# Patient Record
Sex: MALE | Race: WHITE | NOT HISPANIC OR LATINO | Employment: FULL TIME | URBAN - METROPOLITAN AREA
[De-identification: names, ages, dates, MRNs, and addresses within clinical notes are randomized per-mention and may not be internally consistent; named-entity substitution may affect disease eponyms.]

---

## 2017-07-11 ENCOUNTER — ALLSCRIPTS OFFICE VISIT (OUTPATIENT)
Dept: OTHER | Facility: OTHER | Age: 67
End: 2017-07-11

## 2017-07-31 ENCOUNTER — DOCTOR'S OFFICE (OUTPATIENT)
Dept: URBAN - NONMETROPOLITAN AREA CLINIC 1 | Facility: CLINIC | Age: 67
Setting detail: OPHTHALMOLOGY
End: 2017-07-31
Payer: COMMERCIAL

## 2017-07-31 ENCOUNTER — DOCTOR'S OFFICE (OUTPATIENT)
Dept: URBAN - NONMETROPOLITAN AREA CLINIC 1 | Facility: CLINIC | Age: 67
Setting detail: OPHTHALMOLOGY
End: 2017-07-31

## 2017-07-31 ENCOUNTER — RX ONLY (RX ONLY)
Age: 67
End: 2017-07-31

## 2017-07-31 DIAGNOSIS — H35.413: ICD-10-CM

## 2017-07-31 DIAGNOSIS — E11.3293: ICD-10-CM

## 2017-07-31 DIAGNOSIS — H25.13: ICD-10-CM

## 2017-07-31 DIAGNOSIS — Z79.84: ICD-10-CM

## 2017-07-31 DIAGNOSIS — H43.812: ICD-10-CM

## 2017-07-31 DIAGNOSIS — H52.4: ICD-10-CM

## 2017-07-31 DIAGNOSIS — H43.811: ICD-10-CM

## 2017-07-31 DIAGNOSIS — H43.813: ICD-10-CM

## 2017-07-31 PROCEDURE — 92225 OPHTHALMOSCOPY EXTENDED INITIAL: CPT | Performed by: OPHTHALMOLOGY

## 2017-07-31 PROCEDURE — VITAEYE VITA EYE VITAMINS: Performed by: OPHTHALMOLOGY

## 2017-07-31 PROCEDURE — 92134 CPTRZ OPH DX IMG PST SGM RTA: CPT | Performed by: OPHTHALMOLOGY

## 2017-07-31 PROCEDURE — 99204 OFFICE O/P NEW MOD 45 MIN: CPT | Performed by: OPHTHALMOLOGY

## 2017-07-31 ASSESSMENT — REFRACTION_MANIFEST
OD_VA3: 20/
OS_VA3: 20/
OD_VA2: 20/
OS_VA3: 20/
OS_VA1: 20/
OD_VA3: 20/
OD_VA2: 20/
OS_VA1: 20/
OD_VA1: 20/
OU_VA: 20/
OS_VA2: 20/
OS_VA2: 20/
OS_VA1: 20/
OD_VA2: 20/
OS_VA3: 20/
OS_VA2: 20/
OU_VA: 20/
OD_VA3: 20/
OD_VA1: 20/
OU_VA: 20/
OD_VA1: 20/

## 2017-07-31 ASSESSMENT — REFRACTION_CURRENTRX
OD_OVR_VA: 20/
OS_OVR_VA: 20/
OD_OVR_VA: 20/
OD_OVR_VA: 20/
OS_OVR_VA: 20/
OS_OVR_VA: 20/

## 2017-07-31 ASSESSMENT — VISUAL ACUITY
OS_BCVA: 20/30+2
OD_BCVA: 20/25-1

## 2017-07-31 ASSESSMENT — CONFRONTATIONAL VISUAL FIELD TEST (CVF)
OD_FINDINGS: FULL
OS_FINDINGS: FULL

## 2017-08-14 ENCOUNTER — DOCTOR'S OFFICE (OUTPATIENT)
Dept: URBAN - METROPOLITAN AREA CLINIC 126 | Facility: CLINIC | Age: 67
Setting detail: OPHTHALMOLOGY
End: 2017-08-14

## 2017-08-14 ENCOUNTER — DOCTOR'S OFFICE (OUTPATIENT)
Dept: URBAN - NONMETROPOLITAN AREA CLINIC 1 | Facility: CLINIC | Age: 67
Setting detail: OPHTHALMOLOGY
End: 2017-08-14
Payer: COMMERCIAL

## 2017-08-14 DIAGNOSIS — Z79.84: ICD-10-CM

## 2017-08-14 DIAGNOSIS — H43.811: ICD-10-CM

## 2017-08-14 DIAGNOSIS — H43.813: ICD-10-CM

## 2017-08-14 DIAGNOSIS — H43.812: ICD-10-CM

## 2017-08-14 DIAGNOSIS — E11.3293: ICD-10-CM

## 2017-08-14 DIAGNOSIS — H52.4: ICD-10-CM

## 2017-08-14 DIAGNOSIS — H35.413: ICD-10-CM

## 2017-08-14 DIAGNOSIS — H25.13: ICD-10-CM

## 2017-08-14 PROCEDURE — VITAEYE VITA EYE VITAMINS: Performed by: OPHTHALMOLOGY

## 2017-08-14 PROCEDURE — 92014 COMPRE OPH EXAM EST PT 1/>: CPT | Performed by: OPHTHALMOLOGY

## 2017-08-14 PROCEDURE — 92226 OPHTHALMOSCOPY EXT SUBSEQUENT: CPT | Performed by: OPHTHALMOLOGY

## 2017-08-14 ASSESSMENT — REFRACTION_MANIFEST
OS_VA1: 20/
OS_VA3: 20/
OS_VA2: 20/
OS_VA3: 20/
OD_VA2: 20/
OS_VA1: 20/
OS_VA2: 20/
OD_VA1: 20/
OS_VA1: 20/
OD_VA3: 20/
OS_VA2: 20/
OD_VA2: 20/
OD_VA2: 20/
OD_VA3: 20/
OU_VA: 20/
OU_VA: 20/
OD_VA3: 20/
OS_VA3: 20/
OD_VA1: 20/
OU_VA: 20/
OD_VA1: 20/

## 2017-08-14 ASSESSMENT — REFRACTION_CURRENTRX
OD_OVR_VA: 20/
OD_OVR_VA: 20/
OS_OVR_VA: 20/
OS_OVR_VA: 20/
OD_OVR_VA: 20/
OS_OVR_VA: 20/

## 2017-08-14 ASSESSMENT — VISUAL ACUITY
OS_BCVA: 20/30+1
OD_BCVA: 20/25+1

## 2017-08-14 ASSESSMENT — CONFRONTATIONAL VISUAL FIELD TEST (CVF)
OD_FINDINGS: FULL
OS_FINDINGS: FULL

## 2017-10-16 ENCOUNTER — DOCTOR'S OFFICE (OUTPATIENT)
Dept: URBAN - NONMETROPOLITAN AREA CLINIC 1 | Facility: CLINIC | Age: 67
Setting detail: OPHTHALMOLOGY
End: 2017-10-16
Payer: COMMERCIAL

## 2017-10-16 DIAGNOSIS — H43.813: ICD-10-CM

## 2017-10-16 DIAGNOSIS — H25.13: ICD-10-CM

## 2017-10-16 DIAGNOSIS — H43.812: ICD-10-CM

## 2017-10-16 DIAGNOSIS — Z79.84: ICD-10-CM

## 2017-10-16 DIAGNOSIS — E11.3293: ICD-10-CM

## 2017-10-16 DIAGNOSIS — H43.811: ICD-10-CM

## 2017-10-16 DIAGNOSIS — H35.413: ICD-10-CM

## 2017-10-16 PROCEDURE — 92014 COMPRE OPH EXAM EST PT 1/>: CPT | Performed by: OPHTHALMOLOGY

## 2017-10-16 PROCEDURE — 92226 OPHTHALMOSCOPY EXT SUBSEQUENT: CPT | Performed by: OPHTHALMOLOGY

## 2017-10-16 ASSESSMENT — REFRACTION_CURRENTRX
OS_OVR_VA: 20/
OS_OVR_VA: 20/
OD_OVR_VA: 20/
OD_OVR_VA: 20/
OS_OVR_VA: 20/
OD_OVR_VA: 20/

## 2017-10-16 ASSESSMENT — REFRACTION_MANIFEST
OU_VA: 20/
OD_VA3: 20/
OD_VA3: 20/
OU_VA: 20/
OS_VA1: 20/
OS_VA2: 20/
OS_VA1: 20/
OS_VA3: 20/
OD_VA2: 20/
OS_VA3: 20/
OS_VA1: 20/
OD_VA3: 20/
OD_VA2: 20/
OD_VA1: 20/
OS_VA2: 20/
OD_VA2: 20/
OS_VA3: 20/
OS_VA2: 20/
OU_VA: 20/

## 2017-10-16 ASSESSMENT — CONFRONTATIONAL VISUAL FIELD TEST (CVF)
OD_FINDINGS: FULL
OS_FINDINGS: FULL

## 2017-10-16 ASSESSMENT — VISUAL ACUITY
OS_BCVA: 20/30-2
OD_BCVA: 20/25-1

## 2017-11-30 ENCOUNTER — DOCTOR'S OFFICE (OUTPATIENT)
Dept: URBAN - NONMETROPOLITAN AREA CLINIC 1 | Facility: CLINIC | Age: 67
Setting detail: OPHTHALMOLOGY
End: 2017-11-30
Payer: COMMERCIAL

## 2017-11-30 DIAGNOSIS — H35.413: ICD-10-CM

## 2017-11-30 DIAGNOSIS — Z79.84: ICD-10-CM

## 2017-11-30 DIAGNOSIS — H25.13: ICD-10-CM

## 2017-11-30 DIAGNOSIS — H43.813: ICD-10-CM

## 2017-11-30 DIAGNOSIS — E11.3293: ICD-10-CM

## 2017-11-30 DIAGNOSIS — H43.811: ICD-10-CM

## 2017-11-30 DIAGNOSIS — H43.812: ICD-10-CM

## 2017-11-30 PROCEDURE — 92014 COMPRE OPH EXAM EST PT 1/>: CPT | Performed by: OPHTHALMOLOGY

## 2017-11-30 PROCEDURE — 92226 OPHTHALMOSCOPY EXT SUBSEQUENT: CPT | Performed by: OPHTHALMOLOGY

## 2017-11-30 ASSESSMENT — REFRACTION_MANIFEST
OD_VA2: 20/
OS_VA1: 20/
OU_VA: 20/
OD_VA3: 20/
OS_VA2: 20/
OS_VA2: 20/
OS_VA3: 20/
OS_VA1: 20/
OD_VA1: 20/
OD_VA2: 20/
OS_VA3: 20/
OS_VA3: 20/
OD_VA3: 20/
OS_VA2: 20/
OD_VA3: 20/
OS_VA1: 20/
OD_VA1: 20/
OD_VA2: 20/
OD_VA1: 20/
OU_VA: 20/
OU_VA: 20/

## 2017-11-30 ASSESSMENT — REFRACTION_CURRENTRX
OS_OVR_VA: 20/
OD_OVR_VA: 20/
OS_OVR_VA: 20/
OD_OVR_VA: 20/
OS_OVR_VA: 20/
OD_OVR_VA: 20/

## 2017-11-30 ASSESSMENT — CONFRONTATIONAL VISUAL FIELD TEST (CVF)
OD_FINDINGS: FULL
OS_FINDINGS: FULL

## 2017-11-30 ASSESSMENT — VISUAL ACUITY
OS_BCVA: 20/25-1
OD_BCVA: 20/20

## 2017-12-05 ENCOUNTER — ALLSCRIPTS OFFICE VISIT (OUTPATIENT)
Dept: OTHER | Facility: OTHER | Age: 67
End: 2017-12-05

## 2017-12-05 LAB
CLARITY UR: NORMAL
COLOR UR: YELLOW
GLUCOSE (HISTORICAL): NORMAL
HGB UR QL STRIP.AUTO: NORMAL
KETONES UR STRIP-MCNC: NORMAL MG/DL
LEUKOCYTE ESTERASE UR QL STRIP: NORMAL
NITRITE UR QL STRIP: NORMAL
PH UR STRIP.AUTO: 5 [PH]
PROT UR STRIP-MCNC: NORMAL MG/DL
SP GR UR STRIP.AUTO: 1.01

## 2017-12-07 ENCOUNTER — DOCTOR'S OFFICE (OUTPATIENT)
Dept: URBAN - NONMETROPOLITAN AREA CLINIC 1 | Facility: CLINIC | Age: 67
Setting detail: OPHTHALMOLOGY
End: 2017-12-07
Payer: COMMERCIAL

## 2017-12-07 DIAGNOSIS — H43.813: ICD-10-CM

## 2017-12-07 DIAGNOSIS — Z79.84: ICD-10-CM

## 2017-12-07 DIAGNOSIS — H43.812: ICD-10-CM

## 2017-12-07 DIAGNOSIS — H35.413: ICD-10-CM

## 2017-12-07 DIAGNOSIS — H25.13: ICD-10-CM

## 2017-12-07 DIAGNOSIS — E11.3293: ICD-10-CM

## 2017-12-07 DIAGNOSIS — H43.811: ICD-10-CM

## 2017-12-07 PROCEDURE — 92226 OPHTHALMOSCOPY EXT SUBSEQUENT: CPT | Performed by: OPHTHALMOLOGY

## 2017-12-07 PROCEDURE — 92134 CPTRZ OPH DX IMG PST SGM RTA: CPT | Performed by: OPHTHALMOLOGY

## 2017-12-07 PROCEDURE — 92014 COMPRE OPH EXAM EST PT 1/>: CPT | Performed by: OPHTHALMOLOGY

## 2017-12-07 ASSESSMENT — REFRACTION_MANIFEST
OD_VA2: 20/
OD_VA3: 20/
OD_VA1: 20/
OD_VA1: 20/
OS_VA2: 20/
OD_VA2: 20/
OD_VA2: 20/
OS_VA1: 20/
OS_VA2: 20/
OS_VA3: 20/
OD_VA3: 20/
OD_VA1: 20/
OS_VA1: 20/
OU_VA: 20/
OS_VA1: 20/
OS_VA2: 20/
OD_VA3: 20/
OS_VA3: 20/
OS_VA3: 20/
OU_VA: 20/
OU_VA: 20/

## 2017-12-07 ASSESSMENT — REFRACTION_CURRENTRX
OS_OVR_VA: 20/
OS_OVR_VA: 20/
OD_OVR_VA: 20/
OS_OVR_VA: 20/

## 2017-12-07 ASSESSMENT — CONFRONTATIONAL VISUAL FIELD TEST (CVF)
OD_FINDINGS: FULL
OS_FINDINGS: FULL

## 2017-12-07 ASSESSMENT — VISUAL ACUITY
OS_BCVA: 20/25-1
OD_BCVA: 20/25-1

## 2017-12-21 ENCOUNTER — DOCTOR'S OFFICE (OUTPATIENT)
Dept: URBAN - NONMETROPOLITAN AREA CLINIC 1 | Facility: CLINIC | Age: 67
Setting detail: OPHTHALMOLOGY
End: 2017-12-21
Payer: COMMERCIAL

## 2017-12-21 DIAGNOSIS — H43.813: ICD-10-CM

## 2017-12-21 DIAGNOSIS — H43.812: ICD-10-CM

## 2017-12-21 DIAGNOSIS — H43.811: ICD-10-CM

## 2017-12-21 DIAGNOSIS — H25.13: ICD-10-CM

## 2017-12-21 DIAGNOSIS — Z79.84: ICD-10-CM

## 2017-12-21 DIAGNOSIS — H35.413: ICD-10-CM

## 2017-12-21 DIAGNOSIS — E11.3293: ICD-10-CM

## 2017-12-21 PROCEDURE — 92014 COMPRE OPH EXAM EST PT 1/>: CPT | Performed by: OPHTHALMOLOGY

## 2017-12-21 PROCEDURE — 92226 OPHTHALMOSCOPY EXT SUBSEQUENT: CPT | Performed by: OPHTHALMOLOGY

## 2017-12-21 PROCEDURE — 92134 CPTRZ OPH DX IMG PST SGM RTA: CPT | Performed by: OPHTHALMOLOGY

## 2017-12-21 ASSESSMENT — REFRACTION_CURRENTRX
OD_OVR_VA: 20/
OS_OVR_VA: 20/

## 2017-12-21 ASSESSMENT — REFRACTION_MANIFEST
OS_VA1: 20/
OU_VA: 20/
OD_VA2: 20/
OD_VA3: 20/
OD_VA2: 20/
OS_VA2: 20/
OS_VA3: 20/
OS_VA1: 20/
OS_VA2: 20/
OS_VA3: 20/
OD_VA3: 20/
OD_VA1: 20/
OS_VA3: 20/
OU_VA: 20/
OD_VA2: 20/
OD_VA1: 20/
OS_VA2: 20/
OD_VA1: 20/
OS_VA1: 20/
OD_VA3: 20/
OU_VA: 20/

## 2017-12-21 ASSESSMENT — CONFRONTATIONAL VISUAL FIELD TEST (CVF)
OD_FINDINGS: FULL
OS_FINDINGS: FULL

## 2017-12-21 ASSESSMENT — VISUAL ACUITY
OS_BCVA: 20/30-1
OD_BCVA: 20/25-2

## 2018-01-10 NOTE — PROGRESS NOTES
Discussion/Summary    He is in for a basic med for flying  I had a lengthy discussion with him about his medications and the reason he was denied for an FA medical in the past  He apparently qualifies now for the basic med  His blood sugar is under control and he has lost a significant amount of weight  I filled out the paperwork for his basic med        Chief Complaint  PT HERE FOR BASIC MED EXAM       Vitals   Recorded: [id number] 06:26PM   Heart Rate 93, L Radial   Pulse Quality Normal, L Radial   Systolic 769, LUE, Sitting   Diastolic 78, LUE, Sitting   Height 5 ft 8 in   Weight 282 lb    BMI Calculated 42 88   BSA Calculated 2 37     Signatures   Electronically signed by : Mulugeta Davila DO; Jul 11 2017  7:35PM EST                       (Author)

## 2018-01-14 VITALS
BODY MASS INDEX: 42.74 KG/M2 | WEIGHT: 282 LBS | HEIGHT: 68 IN | DIASTOLIC BLOOD PRESSURE: 78 MMHG | SYSTOLIC BLOOD PRESSURE: 130 MMHG | HEART RATE: 93 BPM

## 2018-01-23 VITALS
DIASTOLIC BLOOD PRESSURE: 80 MMHG | SYSTOLIC BLOOD PRESSURE: 140 MMHG | RESPIRATION RATE: 16 BRPM | BODY MASS INDEX: 45.01 KG/M2 | HEIGHT: 68 IN | HEART RATE: 96 BPM | WEIGHT: 297 LBS

## 2018-02-16 ENCOUNTER — DOCTOR'S OFFICE (OUTPATIENT)
Dept: URBAN - NONMETROPOLITAN AREA CLINIC 1 | Facility: CLINIC | Age: 68
Setting detail: OPHTHALMOLOGY
End: 2018-02-16
Payer: COMMERCIAL

## 2018-02-16 DIAGNOSIS — H43.811: ICD-10-CM

## 2018-02-16 DIAGNOSIS — H25.13: ICD-10-CM

## 2018-02-16 DIAGNOSIS — H43.813: ICD-10-CM

## 2018-02-16 DIAGNOSIS — E11.3293: ICD-10-CM

## 2018-02-16 DIAGNOSIS — Z79.84: ICD-10-CM

## 2018-02-16 DIAGNOSIS — H43.812: ICD-10-CM

## 2018-02-16 DIAGNOSIS — H35.413: ICD-10-CM

## 2018-02-16 PROCEDURE — 92134 CPTRZ OPH DX IMG PST SGM RTA: CPT | Performed by: OPHTHALMOLOGY

## 2018-02-16 PROCEDURE — 92226 OPHTHALMOSCOPY EXT SUBSEQUENT: CPT | Performed by: OPHTHALMOLOGY

## 2018-02-16 PROCEDURE — 92014 COMPRE OPH EXAM EST PT 1/>: CPT | Performed by: OPHTHALMOLOGY

## 2018-02-16 ASSESSMENT — REFRACTION_CURRENTRX
OD_OVR_VA: 20/
OS_OVR_VA: 20/

## 2018-02-16 ASSESSMENT — REFRACTION_MANIFEST
OS_VA2: 20/
OD_VA3: 20/
OS_VA3: 20/
OS_VA3: 20/
OS_VA1: 20/
OS_VA1: 20/
OU_VA: 20/
OS_VA2: 20/
OD_VA3: 20/
OD_VA2: 20/
OS_VA3: 20/
OD_VA1: 20/
OS_VA2: 20/
OD_VA3: 20/
OU_VA: 20/
OD_VA1: 20/
OD_VA2: 20/
OD_VA1: 20/
OD_VA2: 20/
OS_VA1: 20/
OU_VA: 20/

## 2018-02-16 ASSESSMENT — VISUAL ACUITY
OS_BCVA: 20/25
OD_BCVA: 20/25

## 2018-02-16 ASSESSMENT — CONFRONTATIONAL VISUAL FIELD TEST (CVF)
OD_FINDINGS: FULL
OS_FINDINGS: FULL

## 2018-04-26 ENCOUNTER — DOCTOR'S OFFICE (OUTPATIENT)
Dept: URBAN - NONMETROPOLITAN AREA CLINIC 1 | Facility: CLINIC | Age: 68
Setting detail: OPHTHALMOLOGY
End: 2018-04-26
Payer: COMMERCIAL

## 2018-04-26 DIAGNOSIS — H43.811: ICD-10-CM

## 2018-04-26 DIAGNOSIS — H35.413: ICD-10-CM

## 2018-04-26 DIAGNOSIS — H43.812: ICD-10-CM

## 2018-04-26 DIAGNOSIS — H25.13: ICD-10-CM

## 2018-04-26 DIAGNOSIS — E11.3293: ICD-10-CM

## 2018-04-26 DIAGNOSIS — H43.813: ICD-10-CM

## 2018-04-26 PROCEDURE — 92014 COMPRE OPH EXAM EST PT 1/>: CPT | Performed by: OPHTHALMOLOGY

## 2018-04-26 PROCEDURE — 92134 CPTRZ OPH DX IMG PST SGM RTA: CPT | Performed by: OPHTHALMOLOGY

## 2018-04-26 PROCEDURE — 92226 OPHTHALMOSCOPY EXT SUBSEQUENT: CPT | Performed by: OPHTHALMOLOGY

## 2018-04-26 ASSESSMENT — REFRACTION_MANIFEST
OD_VA1: 20/
OD_VA3: 20/
OD_VA2: 20/
OS_VA2: 20/
OS_VA3: 20/
OS_VA1: 20/
OD_VA2: 20/
OS_VA1: 20/
OU_VA: 20/
OS_VA2: 20/
OU_VA: 20/
OS_VA3: 20/
OD_VA2: 20/
OD_VA3: 20/
OD_VA1: 20/
OS_VA3: 20/
OD_VA3: 20/
OU_VA: 20/
OS_VA1: 20/
OD_VA1: 20/
OS_VA2: 20/

## 2018-04-26 ASSESSMENT — VISUAL ACUITY
OS_BCVA: 20/30+2
OD_BCVA: 20/25-2

## 2018-04-26 ASSESSMENT — REFRACTION_CURRENTRX
OD_OVR_VA: 20/
OD_OVR_VA: 20/
OS_OVR_VA: 20/
OD_OVR_VA: 20/

## 2018-04-26 ASSESSMENT — CONFRONTATIONAL VISUAL FIELD TEST (CVF)
OD_FINDINGS: FULL
OS_FINDINGS: FULL

## 2018-12-05 DIAGNOSIS — Z12.5 ENCOUNTER FOR SCREENING FOR MALIGNANT NEOPLASM OF PROSTATE: ICD-10-CM

## 2018-12-06 DIAGNOSIS — Z12.5 ENCOUNTER FOR SCREENING FOR MALIGNANT NEOPLASM OF PROSTATE: Primary | ICD-10-CM

## 2019-01-21 ENCOUNTER — DOCTOR'S OFFICE (OUTPATIENT)
Dept: URBAN - NONMETROPOLITAN AREA CLINIC 1 | Facility: CLINIC | Age: 69
Setting detail: OPHTHALMOLOGY
End: 2019-01-21
Payer: COMMERCIAL

## 2019-01-21 DIAGNOSIS — H43.812: ICD-10-CM

## 2019-01-21 DIAGNOSIS — E11.3293: ICD-10-CM

## 2019-01-21 DIAGNOSIS — H35.413: ICD-10-CM

## 2019-01-21 DIAGNOSIS — H25.13: ICD-10-CM

## 2019-01-21 DIAGNOSIS — H43.811: ICD-10-CM

## 2019-01-21 DIAGNOSIS — H43.813: ICD-10-CM

## 2019-01-21 PROCEDURE — 92226 OPHTHALMOSCOPY EXT SUBSEQUENT: CPT | Performed by: OPHTHALMOLOGY

## 2019-01-21 PROCEDURE — 92014 COMPRE OPH EXAM EST PT 1/>: CPT | Performed by: OPHTHALMOLOGY

## 2019-01-21 PROCEDURE — 92134 CPTRZ OPH DX IMG PST SGM RTA: CPT | Performed by: OPHTHALMOLOGY

## 2019-01-21 ASSESSMENT — REFRACTION_MANIFEST
OD_VA3: 20/
OS_VA3: 20/
OS_VA3: 20/
OS_VA2: 20/
OD_VA1: 20/
OD_VA1: 20/
OD_VA2: 20/
OD_VA3: 20/
OS_VA1: 20/
OS_VA2: 20/
OD_VA2: 20/
OU_VA: 20/
OU_VA: 20/
OS_VA1: 20/

## 2019-01-21 ASSESSMENT — VISUAL ACUITY
OS_BCVA: 20/25-1
OD_BCVA: 20/30+2

## 2019-01-21 ASSESSMENT — CONFRONTATIONAL VISUAL FIELD TEST (CVF)
OD_FINDINGS: FULL
OS_FINDINGS: FULL

## 2019-01-21 ASSESSMENT — REFRACTION_CURRENTRX
OS_OVR_VA: 20/
OS_OVR_VA: 20/
OD_OVR_VA: 20/
OS_OVR_VA: 20/
OD_OVR_VA: 20/
OD_OVR_VA: 20/

## 2019-02-04 ENCOUNTER — DOCTOR'S OFFICE (OUTPATIENT)
Dept: URBAN - NONMETROPOLITAN AREA CLINIC 1 | Facility: CLINIC | Age: 69
Setting detail: OPHTHALMOLOGY
End: 2019-02-04
Payer: COMMERCIAL

## 2019-02-04 DIAGNOSIS — E11.3293: ICD-10-CM

## 2019-02-04 DIAGNOSIS — H35.413: ICD-10-CM

## 2019-02-04 DIAGNOSIS — H43.813: ICD-10-CM

## 2019-02-04 PROCEDURE — 92014 COMPRE OPH EXAM EST PT 1/>: CPT | Performed by: OPHTHALMOLOGY

## 2019-02-04 PROCEDURE — 92250 FUNDUS PHOTOGRAPHY W/I&R: CPT | Performed by: OPHTHALMOLOGY

## 2019-02-04 PROCEDURE — 92235 FLUORESCEIN ANGRPH MLTIFRAME: CPT | Performed by: OPHTHALMOLOGY

## 2019-02-04 ASSESSMENT — REFRACTION_CURRENTRX
OD_OVR_VA: 20/
OS_OVR_VA: 20/
OS_OVR_VA: 20/
OD_OVR_VA: 20/
OS_OVR_VA: 20/
OD_OVR_VA: 20/

## 2019-02-04 ASSESSMENT — REFRACTION_MANIFEST
OS_VA1: 20/
OS_VA2: 20/
OD_VA2: 20/
OS_VA2: 20/
OU_VA: 20/
OS_VA3: 20/
OD_VA1: 20/
OD_VA3: 20/
OU_VA: 20/
OD_VA2: 20/
OS_VA3: 20/
OS_VA1: 20/
OD_VA3: 20/
OD_VA1: 20/

## 2019-02-04 ASSESSMENT — VISUAL ACUITY
OD_BCVA: 20/30+2
OS_BCVA: 20/25-1

## 2019-02-04 ASSESSMENT — CONFRONTATIONAL VISUAL FIELD TEST (CVF)
OD_FINDINGS: FULL
OS_FINDINGS: FULL

## 2019-03-29 LAB — HBA1C MFR BLD HPLC: 6.1 %

## 2019-04-02 ENCOUNTER — OFFICE VISIT (OUTPATIENT)
Dept: UROLOGY | Facility: CLINIC | Age: 69
End: 2019-04-02
Payer: COMMERCIAL

## 2019-04-02 VITALS
SYSTOLIC BLOOD PRESSURE: 138 MMHG | BODY MASS INDEX: 43.49 KG/M2 | WEIGHT: 286 LBS | DIASTOLIC BLOOD PRESSURE: 62 MMHG | HEART RATE: 80 BPM

## 2019-04-02 DIAGNOSIS — Z12.5 ENCOUNTER FOR SCREENING FOR MALIGNANT NEOPLASM OF PROSTATE: Primary | ICD-10-CM

## 2019-04-02 PROCEDURE — 99213 OFFICE O/P EST LOW 20 MIN: CPT | Performed by: UROLOGY

## 2019-04-02 RX ORDER — GLIPIZIDE 5 MG/1
10 TABLET ORAL
COMMUNITY
Start: 2017-11-27

## 2019-04-02 RX ORDER — METOPROLOL SUCCINATE 100 MG/1
TABLET, EXTENDED RELEASE ORAL
COMMUNITY
Start: 2019-03-04

## 2019-04-02 RX ORDER — CINNAMON BARK
1000 POWDER (GRAM) MISCELLANEOUS DAILY
COMMUNITY

## 2019-04-02 RX ORDER — PENICILLIN V POTASSIUM 500 MG/1
500 TABLET ORAL AS NEEDED
COMMUNITY
Start: 2019-03-21

## 2019-04-02 RX ORDER — ASPIRIN 81 MG/1
81 TABLET, CHEWABLE ORAL DAILY
COMMUNITY

## 2019-04-02 RX ORDER — ASPIRIN AND DIPYRIDAMOLE 25; 200 MG/1; MG/1
CAPSULE, EXTENDED RELEASE ORAL DAILY
COMMUNITY

## 2019-04-02 RX ORDER — LISINOPRIL 20 MG/1
20 TABLET ORAL 2 TIMES DAILY
COMMUNITY
Start: 2017-11-27

## 2019-04-02 RX ORDER — UBIDECARENONE 60 MG
WAFER ORAL
COMMUNITY

## 2019-04-02 RX ORDER — BLOOD-GLUCOSE METER
EACH MISCELLANEOUS
COMMUNITY
Start: 2019-03-04

## 2019-04-02 RX ORDER — AMLODIPINE BESYLATE 10 MG/1
10 TABLET ORAL 2 TIMES DAILY
COMMUNITY
Start: 2017-11-27

## 2019-04-02 RX ORDER — TAMSULOSIN HYDROCHLORIDE 0.4 MG/1
CAPSULE ORAL 2 TIMES DAILY
COMMUNITY
Start: 2017-11-27

## 2019-04-02 RX ORDER — HYDROCHLOROTHIAZIDE 12.5 MG/1
12.5 CAPSULE, GELATIN COATED ORAL DAILY
COMMUNITY
Start: 2017-11-27

## 2019-09-23 ENCOUNTER — DOCTOR'S OFFICE (OUTPATIENT)
Dept: URBAN - NONMETROPOLITAN AREA CLINIC 1 | Facility: CLINIC | Age: 69
Setting detail: OPHTHALMOLOGY
End: 2019-09-23
Payer: COMMERCIAL

## 2019-09-23 ENCOUNTER — RX ONLY (RX ONLY)
Age: 69
End: 2019-09-23

## 2019-09-23 DIAGNOSIS — H43.813: ICD-10-CM

## 2019-09-23 DIAGNOSIS — H43.812: ICD-10-CM

## 2019-09-23 DIAGNOSIS — H35.413: ICD-10-CM

## 2019-09-23 DIAGNOSIS — H43.811: ICD-10-CM

## 2019-09-23 DIAGNOSIS — E11.3293: ICD-10-CM

## 2019-09-23 PROCEDURE — 92226 OPHTHALMOSCOPY EXT SUBSEQUENT: CPT | Performed by: OPHTHALMOLOGY

## 2019-09-23 PROCEDURE — 92134 CPTRZ OPH DX IMG PST SGM RTA: CPT | Performed by: OPHTHALMOLOGY

## 2019-09-23 PROCEDURE — 92014 COMPRE OPH EXAM EST PT 1/>: CPT | Performed by: OPHTHALMOLOGY

## 2019-09-23 ASSESSMENT — REFRACTION_MANIFEST
OU_VA: 20/
OD_VA3: 20/
OS_VA1: 20/
OS_VA2: 20/
OD_VA1: 20/
OD_VA2: 20/
OS_VA1: 20/
OD_VA1: 20/
OS_VA2: 20/
OD_VA3: 20/
OU_VA: 20/
OS_VA3: 20/
OD_VA2: 20/
OS_VA3: 20/

## 2019-09-23 ASSESSMENT — CONFRONTATIONAL VISUAL FIELD TEST (CVF)
OS_FINDINGS: FULL
OD_FINDINGS: FULL

## 2019-09-23 ASSESSMENT — REFRACTION_CURRENTRX
OS_OVR_VA: 20/
OS_OVR_VA: 20/
OD_OVR_VA: 20/
OS_OVR_VA: 20/

## 2019-09-23 ASSESSMENT — VISUAL ACUITY
OS_BCVA: 20/30-2
OD_BCVA: 20/25

## 2019-10-21 ENCOUNTER — DOCTOR'S OFFICE (OUTPATIENT)
Dept: URBAN - NONMETROPOLITAN AREA CLINIC 1 | Facility: CLINIC | Age: 69
Setting detail: OPHTHALMOLOGY
End: 2019-10-21
Payer: COMMERCIAL

## 2019-10-21 ENCOUNTER — RX ONLY (RX ONLY)
Age: 69
End: 2019-10-21

## 2019-10-21 DIAGNOSIS — H43.811: ICD-10-CM

## 2019-10-21 DIAGNOSIS — H43.812: ICD-10-CM

## 2019-10-21 DIAGNOSIS — H43.813: ICD-10-CM

## 2019-10-21 DIAGNOSIS — H35.413: ICD-10-CM

## 2019-10-21 DIAGNOSIS — E11.3293: ICD-10-CM

## 2019-10-21 PROCEDURE — 92014 COMPRE OPH EXAM EST PT 1/>: CPT | Performed by: OPHTHALMOLOGY

## 2019-10-21 PROCEDURE — 92250 FUNDUS PHOTOGRAPHY W/I&R: CPT | Performed by: OPHTHALMOLOGY

## 2019-10-21 PROCEDURE — 92226 OPHTHALMOSCOPY EXT SUBSEQUENT: CPT | Performed by: OPHTHALMOLOGY

## 2019-10-21 PROCEDURE — 92235 FLUORESCEIN ANGRPH MLTIFRAME: CPT | Performed by: OPHTHALMOLOGY

## 2019-10-21 ASSESSMENT — CONFRONTATIONAL VISUAL FIELD TEST (CVF)
OS_FINDINGS: FULL
OD_FINDINGS: FULL

## 2019-10-21 ASSESSMENT — REFRACTION_MANIFEST
OS_VA3: 20/
OS_VA2: 20/
OU_VA: 20/
OD_VA3: 20/
OD_VA2: 20/
OS_VA1: 20/
OD_VA1: 20/
OS_VA2: 20/
OS_VA3: 20/
OD_VA3: 20/
OS_VA1: 20/
OD_VA2: 20/
OD_VA1: 20/
OU_VA: 20/

## 2019-10-21 ASSESSMENT — REFRACTION_CURRENTRX
OS_OVR_VA: 20/
OD_OVR_VA: 20/
OS_OVR_VA: 20/
OD_OVR_VA: 20/
OS_OVR_VA: 20/
OD_OVR_VA: 20/

## 2019-10-21 ASSESSMENT — VISUAL ACUITY
OS_BCVA: 20/30-2
OD_BCVA: 20/25

## 2020-04-24 ENCOUNTER — TELEPHONE (OUTPATIENT)
Dept: UROLOGY | Facility: CLINIC | Age: 70
End: 2020-04-24

## 2020-04-29 ENCOUNTER — TELEPHONE (OUTPATIENT)
Dept: UROLOGY | Facility: CLINIC | Age: 70
End: 2020-04-29

## 2020-06-22 ENCOUNTER — DOCTOR'S OFFICE (OUTPATIENT)
Dept: URBAN - NONMETROPOLITAN AREA CLINIC 1 | Facility: CLINIC | Age: 70
Setting detail: OPHTHALMOLOGY
End: 2020-06-22
Payer: COMMERCIAL

## 2020-06-22 VITALS — HEIGHT: 60 IN

## 2020-06-22 DIAGNOSIS — E11.3293: ICD-10-CM

## 2020-06-22 DIAGNOSIS — H43.813: ICD-10-CM

## 2020-06-22 DIAGNOSIS — H35.413: ICD-10-CM

## 2020-06-22 DIAGNOSIS — H25.13: ICD-10-CM

## 2020-06-22 PROCEDURE — 92014 COMPRE OPH EXAM EST PT 1/>: CPT | Performed by: OPHTHALMOLOGY

## 2020-06-22 PROCEDURE — 92134 CPTRZ OPH DX IMG PST SGM RTA: CPT | Performed by: OPHTHALMOLOGY

## 2020-06-22 PROCEDURE — 92201 OPSCPY EXTND RTA DRAW UNI/BI: CPT | Performed by: OPHTHALMOLOGY

## 2020-06-22 ASSESSMENT — VISUAL ACUITY
OD_BCVA: 20/25
OS_BCVA: 20/40

## 2020-06-22 ASSESSMENT — CONFRONTATIONAL VISUAL FIELD TEST (CVF)
OS_FINDINGS: FULL
OD_FINDINGS: FULL

## 2020-10-16 ENCOUNTER — DOCTOR'S OFFICE (OUTPATIENT)
Dept: URBAN - NONMETROPOLITAN AREA CLINIC 1 | Facility: CLINIC | Age: 70
Setting detail: OPHTHALMOLOGY
End: 2020-10-16
Payer: COMMERCIAL

## 2020-10-16 VITALS — HEIGHT: 60 IN

## 2020-10-16 DIAGNOSIS — E11.3293: ICD-10-CM

## 2020-10-16 DIAGNOSIS — H35.413: ICD-10-CM

## 2020-10-16 DIAGNOSIS — Z79.84: ICD-10-CM

## 2020-10-16 DIAGNOSIS — H25.13: ICD-10-CM

## 2020-10-16 DIAGNOSIS — H43.813: ICD-10-CM

## 2020-10-16 PROCEDURE — 92201 OPSCPY EXTND RTA DRAW UNI/BI: CPT | Performed by: OPHTHALMOLOGY

## 2020-10-16 PROCEDURE — 92014 COMPRE OPH EXAM EST PT 1/>: CPT | Performed by: OPHTHALMOLOGY

## 2020-10-16 PROCEDURE — 92134 CPTRZ OPH DX IMG PST SGM RTA: CPT | Performed by: OPHTHALMOLOGY

## 2020-10-16 ASSESSMENT — CONFRONTATIONAL VISUAL FIELD TEST (CVF)
OS_FINDINGS: FULL
OD_FINDINGS: FULL

## 2020-10-16 ASSESSMENT — VISUAL ACUITY
OD_BCVA: 20/20
OS_BCVA: 20/30

## 2021-05-10 ENCOUNTER — DOCTOR'S OFFICE (OUTPATIENT)
Dept: URBAN - NONMETROPOLITAN AREA CLINIC 1 | Facility: CLINIC | Age: 71
Setting detail: OPHTHALMOLOGY
End: 2021-05-10
Payer: COMMERCIAL

## 2021-05-10 DIAGNOSIS — H43.813: ICD-10-CM

## 2021-05-10 DIAGNOSIS — H25.13: ICD-10-CM

## 2021-05-10 DIAGNOSIS — E11.3293: ICD-10-CM

## 2021-05-10 DIAGNOSIS — H35.413: ICD-10-CM

## 2021-05-10 PROCEDURE — 92014 COMPRE OPH EXAM EST PT 1/>: CPT | Performed by: OPHTHALMOLOGY

## 2021-05-10 PROCEDURE — 92201 OPSCPY EXTND RTA DRAW UNI/BI: CPT | Performed by: OPHTHALMOLOGY

## 2021-05-10 PROCEDURE — 92134 CPTRZ OPH DX IMG PST SGM RTA: CPT | Performed by: OPHTHALMOLOGY

## 2021-05-10 ASSESSMENT — VISUAL ACUITY
OS_BCVA: 20/30-2
OD_BCVA: 20/25-2

## 2021-05-10 ASSESSMENT — CONFRONTATIONAL VISUAL FIELD TEST (CVF)
OD_FINDINGS: FULL
OS_FINDINGS: FULL

## 2021-06-28 ENCOUNTER — DOCTOR'S OFFICE (OUTPATIENT)
Dept: URBAN - NONMETROPOLITAN AREA CLINIC 1 | Facility: CLINIC | Age: 71
Setting detail: OPHTHALMOLOGY
End: 2021-06-28
Payer: COMMERCIAL

## 2021-06-28 VITALS — HEIGHT: 60 IN

## 2021-06-28 DIAGNOSIS — H35.413: ICD-10-CM

## 2021-06-28 DIAGNOSIS — E11.3293: ICD-10-CM

## 2021-06-28 PROCEDURE — 92235 FLUORESCEIN ANGRPH MLTIFRAME: CPT | Performed by: OPHTHALMOLOGY

## 2021-06-28 PROCEDURE — 92014 COMPRE OPH EXAM EST PT 1/>: CPT | Performed by: OPHTHALMOLOGY

## 2021-06-28 PROCEDURE — 92250 FUNDUS PHOTOGRAPHY W/I&R: CPT | Performed by: OPHTHALMOLOGY

## 2021-06-28 ASSESSMENT — CONFRONTATIONAL VISUAL FIELD TEST (CVF)
OD_FINDINGS: FULL
OS_FINDINGS: FULL

## 2021-06-28 ASSESSMENT — VISUAL ACUITY
OS_BCVA: 20/40
OD_BCVA: 20/40

## 2022-01-07 ENCOUNTER — TELEPHONE (OUTPATIENT)
Dept: CARDIOLOGY | Facility: CLINIC | Age: 72
End: 2022-01-07

## 2022-01-07 DIAGNOSIS — I35.0 NONRHEUMATIC AORTIC VALVE STENOSIS: Primary | ICD-10-CM

## 2022-01-07 NOTE — TELEPHONE ENCOUNTER
Meagan he needs an appointment as a new patient and he needs an echo on the same day that he comes to see me.  Thanks

## 2022-01-07 NOTE — TELEPHONE ENCOUNTER
Patient would like to speak to you before scheduling.      He knows you from way back [facility] days.    He would like for you to call him.  The best number to reach him is his work number [phone], if you don't get him on that number you can try his cell [phone]

## 2022-01-28 ENCOUNTER — OFFICE VISIT (OUTPATIENT)
Dept: CARDIOLOGY | Facility: CLINIC | Age: 72
End: 2022-01-28
Payer: COMMERCIAL

## 2022-01-28 ENCOUNTER — HOSPITAL ENCOUNTER (OUTPATIENT)
Dept: CARDIOLOGY | Facility: CLINIC | Age: 72
Discharge: HOME | End: 2022-01-28
Payer: COMMERCIAL

## 2022-01-28 ENCOUNTER — TELEPHONE (OUTPATIENT)
Dept: SCHEDULING | Facility: CLINIC | Age: 72
End: 2022-01-28
Payer: COMMERCIAL

## 2022-01-28 ENCOUNTER — TELEPHONE (OUTPATIENT)
Dept: CARDIOTHORACIC SURGERY | Facility: CLINIC | Age: 72
End: 2022-01-28
Payer: COMMERCIAL

## 2022-01-28 VITALS
DIASTOLIC BLOOD PRESSURE: 80 MMHG | WEIGHT: 270 LBS | BODY MASS INDEX: 40.92 KG/M2 | SYSTOLIC BLOOD PRESSURE: 140 MMHG | HEIGHT: 68 IN

## 2022-01-28 VITALS
RESPIRATION RATE: 20 BRPM | DIASTOLIC BLOOD PRESSURE: 80 MMHG | HEART RATE: 77 BPM | WEIGHT: 269 LBS | BODY MASS INDEX: 40.77 KG/M2 | SYSTOLIC BLOOD PRESSURE: 120 MMHG | OXYGEN SATURATION: 95 % | HEIGHT: 68 IN

## 2022-01-28 DIAGNOSIS — E78.00 PURE HYPERCHOLESTEROLEMIA: ICD-10-CM

## 2022-01-28 DIAGNOSIS — I48.91 ATRIAL FIBRILLATION, UNSPECIFIED TYPE (CMS/HCC): ICD-10-CM

## 2022-01-28 DIAGNOSIS — I35.0 NONRHEUMATIC AORTIC VALVE STENOSIS: ICD-10-CM

## 2022-01-28 DIAGNOSIS — I35.0 AORTIC VALVE STENOSIS, ETIOLOGY OF CARDIAC VALVE DISEASE UNSPECIFIED: Primary | ICD-10-CM

## 2022-01-28 DIAGNOSIS — E11.40 TYPE 2 DIABETES MELLITUS WITH DIABETIC NEUROPATHY, WITHOUT LONG-TERM CURRENT USE OF INSULIN (CMS/HCC): Primary | ICD-10-CM

## 2022-01-28 DIAGNOSIS — I10 ESSENTIAL HYPERTENSION: ICD-10-CM

## 2022-01-28 PROBLEM — G72.2 TOXIC MYOPATHY: Status: ACTIVE | Noted: 2017-03-16

## 2022-01-28 PROBLEM — N40.1 BENIGN PROSTATIC HYPERPLASIA WITH NOCTURIA: Status: ACTIVE | Noted: 2017-12-22

## 2022-01-28 PROBLEM — E66.01 MORBID OBESITY (CMS/HCC): Status: ACTIVE | Noted: 2017-12-22

## 2022-01-28 PROBLEM — R35.1 BENIGN PROSTATIC HYPERPLASIA WITH NOCTURIA: Status: ACTIVE | Noted: 2017-12-22

## 2022-01-28 LAB
AORTIC ROOT ANNULUS: 3.9 CM
AORTIC VALVE MEAN VELOCITY: 3.06 M/S
AORTIC VALVE VELOCITY TIME INTEGRAL: 94.6 CM
ASCENDING AORTA: 3.8 CM
AV MEAN GRADIENT: 41 MMHG
AV PEAK GRADIENT: 69 MMHG
AV PEAK VELOCITY-S: 4.15 M/S
AV VALVE AREA: 0.77 CM2
AV VELOCITY RATIO: 0.25
BSA FOR ECHO PROCEDURE: 2.42 M2
DOP CALC LVOT STROKE VOLUME: 75.67 ML
E WAVE DECELERATION TIME: 282 MS
E/E' RATIO: 16.6
E/LAT E' RATIO: 18
EDV (BP): 108 CM3
EF (A4C): 66.9 %
EF A2C: 67.3 %
EJECTION FRACTION: 68.3 %
ESTIMATED PASP: 32 MMHG
ESV (BP): 34.2 CM3
FRACTIONAL SHORTENING: 35 %
INTERVENTRICULAR SEPTUM: 1.4 CM
IVC-EXP: 2.7 CM
LA ESV (BP): 89.2 CM3
LA ESV INDEX (A2C): 38.43 CM3/M2
LA ESV INDEX (BP): 36.86 CM3/M2
LA/AORTA RATIO: 1.23
LAAS-AP2: 26.7 CM2
LAAS-AP4: 25 CM2
LAD 2D: 4.8 CM
LALD A4C: 5.97 CM
LALD A4C: 6.16 CM
LAV-S: 93 CM3
LEFT ATRIUM VOLUME INDEX: 34.38 CM3/M2
LEFT ATRIUM VOLUME: 83.2 CM3
LEFT INTERNAL DIMENSION IN SYSTOLE: 2.4 CM (ref 4.53–6.87)
LEFT VENTRICLE DIASTOLIC VOLUME INDEX: 38.18 CM3/M2
LEFT VENTRICLE DIASTOLIC VOLUME: 92.4 CM3
LEFT VENTRICLE SYSTOLIC VOLUME INDEX: 12.64 CM3/M2
LEFT VENTRICLE SYSTOLIC VOLUME: 30.6 CM3
LEFT VENTRICULAR INTERNAL DIMENSION IN DIASTOLE: 3.69 CM (ref 7.85–10.91)
LEFT VENTRICULAR POSTERIOR WALL IN END DIASTOLE: 1.36 CM (ref 0.91–1.7)
LV DIASTOLIC VOLUME: 115 CM3
LV ESV (APICAL 2 CHAMBER): 37.7 CM3
LVAD-AP2: 35 CM2
LVAD-AP4: 30.2 CM2
LVAS-AP2: 17.8 CM2
LVAS-AP4: 16.2 CM2
LVEDVI(A2C): 47.52 CM3/M2
LVEDVI(BP): 44.63 CM3/M2
LVESVI(A2C): 15.58 CM3/M2
LVESVI(BP): 14.13 CM3/M2
LVLD-AP2: 8.86 CM
LVLD-AP4: 8.12 CM
LVLS-AP2: 7.64 CM
LVLS-AP4: 7.26 CM
LVOT 2D: 2 CM
LVOT A: 3.14 CM2
LVOT MG: 2 MMHG
LVOT MV: 0.69 M/S
LVOT PEAK VELOCITY: 1.02 M/S
LVOT PG: 4 MMHG
LVOT VTI: 24.1 CM
MV E'TISSUE VEL-LAT: 0.1 M/S
MV E'TISSUE VEL-MED: 0.1 M/S
MV PEAK E VEL: 1.71 M/S
MV VALVE AREA P 1/2 METHOD: 2.65 CM2
POSTERIOR WALL: 1.36 CM
PV PEAK GRADIENT: 3 MMHG
PV PV: 0.9 M/S
RAP: 5 MMHG
RVOT VMAX: 0.69 M/S
SEPTAL TISSUE DOPPLER FREE WALL LATE DIA VELOCITY (APICAL 4 CHAMBER VIEW): 0.17 M/S
TR MAX PG: 27 MMHG
TRICUSPID VALVE PEAK REGURGITATION VELOCITY: 2.61 M/S
Z-SCORE OF LEFT VENTRICULAR DIMENSION IN END DIASTOLE: -9.2
Z-SCORE OF LEFT VENTRICULAR DIMENSION IN END SYSTOLE: -6.67
Z-SCORE OF LEFT VENTRICULAR POSTERIOR WALL IN END DIASTOLE: 0.47

## 2022-01-28 PROCEDURE — 93000 ELECTROCARDIOGRAM COMPLETE: CPT | Performed by: INTERNAL MEDICINE

## 2022-01-28 PROCEDURE — 93306 TTE W/DOPPLER COMPLETE: CPT | Performed by: INTERNAL MEDICINE

## 2022-01-28 PROCEDURE — 3008F BODY MASS INDEX DOCD: CPT | Performed by: INTERNAL MEDICINE

## 2022-01-28 PROCEDURE — 99214 OFFICE O/P EST MOD 30 MIN: CPT | Performed by: INTERNAL MEDICINE

## 2022-01-28 RX ORDER — NITROGLYCERIN 0.4 MG/1
TABLET SUBLINGUAL
COMMUNITY
Start: 2021-12-01 | End: 2022-02-02

## 2022-01-28 RX ORDER — ASPIRIN 81 MG/1
81 TABLET ORAL DAILY
COMMUNITY
End: 2022-03-22

## 2022-01-28 RX ORDER — METOPROLOL SUCCINATE 25 MG/1
TABLET, EXTENDED RELEASE ORAL
COMMUNITY
Start: 2021-12-13 | End: 2022-02-02 | Stop reason: DRUGHIGH

## 2022-01-28 RX ORDER — EPINEPHRINE 0.3 MG/.3ML
INJECTION SUBCUTANEOUS
COMMUNITY
Start: 2021-11-16 | End: 2022-02-02

## 2022-01-28 RX ORDER — SPIRONOLACTONE 25 MG/1
25 TABLET ORAL DAILY
COMMUNITY
Start: 2021-11-09

## 2022-01-28 RX ORDER — METOPROLOL SUCCINATE 50 MG/1
TABLET, EXTENDED RELEASE ORAL
COMMUNITY
Start: 2021-12-13 | End: 2022-02-02 | Stop reason: DRUGHIGH

## 2022-01-28 RX ORDER — ASPIRIN AND DIPYRIDAMOLE 25; 200 MG/1; MG/1
1 CAPSULE, EXTENDED RELEASE ORAL 2 TIMES DAILY
COMMUNITY
End: 2022-01-28

## 2022-01-28 RX ORDER — FUROSEMIDE 20 MG/1
20 TABLET ORAL AS NEEDED
COMMUNITY
Start: 2021-12-01 | End: 2022-04-04

## 2022-01-28 RX ORDER — BLOOD SUGAR DIAGNOSTIC
STRIP MISCELLANEOUS
COMMUNITY
Start: 2021-12-29

## 2022-01-28 RX ORDER — SITAGLIPTIN 100 MG/1
100 TABLET, FILM COATED ORAL DAILY
COMMUNITY
Start: 2021-11-08

## 2022-01-28 RX ORDER — ALFUZOSIN HYDROCHLORIDE 10 MG/1
10 TABLET, EXTENDED RELEASE ORAL DAILY
COMMUNITY
Start: 2022-01-23

## 2022-01-28 RX ORDER — METFORMIN HYDROCHLORIDE 500 MG/1
1000 TABLET ORAL 2 TIMES DAILY WITH MEALS
COMMUNITY
Start: 2022-01-26

## 2022-01-28 RX ORDER — METOPROLOL SUCCINATE 100 MG/1
100 TABLET, EXTENDED RELEASE ORAL 2 TIMES DAILY
Status: ON HOLD | COMMUNITY
Start: 2021-11-08 | End: 2022-03-31 | Stop reason: SDUPTHER

## 2022-01-28 RX ORDER — HYDROCHLOROTHIAZIDE 12.5 MG/1
12.5 CAPSULE ORAL DAILY
COMMUNITY
Start: 2021-11-19

## 2022-01-28 RX ORDER — TAMSULOSIN HYDROCHLORIDE 0.4 MG/1
0.4 CAPSULE ORAL DAILY
COMMUNITY
Start: 2022-01-15

## 2022-01-28 RX ORDER — AMLODIPINE BESYLATE 5 MG/1
10 TABLET ORAL DAILY
COMMUNITY
Start: 2021-12-21

## 2022-01-28 RX ORDER — LISINOPRIL 40 MG/1
40 TABLET ORAL 2 TIMES DAILY
COMMUNITY
Start: 2021-12-13

## 2022-01-28 ASSESSMENT — CHADS2 SCORE
DIABETES: YES (+1 PT.)
SEX: MALE
CHF: NO
PRIOR STROKE OR TIA OR THROMBOEMBOLISM: NO
AGE: 65-74 (+1PT.)
HYPERTENSION: YES (+1 PT.)
VASCULAR DISEASE: NO
CHADS2 SCORE: 3

## 2022-01-28 NOTE — TELEPHONE ENCOUNTER
Called and spoke to Yared.  He had a TTE done today at Dr. Jose office. I just ordered a CTA TAVR with bun/creat.      Can you please schedule the CTA with a joint new patient  appt with Dr. Pedroza/MAXWELL to follow.    Thanks so much

## 2022-01-28 NOTE — TELEPHONE ENCOUNTER
Dr Alfredo called to ask the Dr if he should be taking the Aggrenox because this medication has some aspirin in it.     He states Dr just told him today to   please avoid baby aspirin     He can be reached @ [phone]

## 2022-01-28 NOTE — TELEPHONE ENCOUNTER
On January 28, 2022 I called Elbert to discuss his Aggrenox.  His voice male was full.  I will try him again

## 2022-01-28 NOTE — LETTER
"January 28, 2022     Ángel Cristobal MD  [address] [facility] [address]    Patient: Yared Hensley  YOB: 1950  Date of Visit: 1/28/2022      Dear Dr. Cristobal:    Thank you for referring Yared Hensley to me for evaluation. Below are my notes for this consultation.    If you have questions, please do not hesitate to call me. I look forward to following your patient along with you.         Sincerely,        Susanna Miller MD        CC: Roge Pedroza, Susanna Herrera MD  1/28/2022 11:44 AM  Signed                       Cardiology Consult/New Patient     Patient ID: Yared Hensley 71 y.o. male. 1950  PCP: Ángel Cristobal MD   History Present Illness     HPI:           Dear Dr. Cristobal,    On January 28, 2022 I met  Yared Ayden in the office for the first time as a patient.  He has been a longtime friend since my days at [place].  He comes to see me now for evaluation of aortic stenosis.  In 2019 he had a cardiac catheterization done at [facility] where he was found to have patent coronary arteries and was told that by echo he had mild aortic stenosis.  His right heart pressures were elevated at 45/25 with a right atrial pressure of 16.  His wedge was 25.  At that time he was reassured that he should continue medical therapy.    He has a history of diabetes, hypertension, and BPH.  His knees give him significant problems and he tells me he cannot walk because of his knees.  He does get short of breath with minimal activity.  He denies chest pain, lightheadedness, palpitations or syncope.  Twice he has had episodes where in the evening hours he would get \"anxious\" and then become extraordinarily short of breath.  This would happen on both occasions when preparing for bed.  Both episodes lasted very briefly.    He denies thyroid disease, kidney disease, liver disease, neurologic disease or rheumatologic disease.    He does not smoke.  He drinks minimally.  His father had a " myocardial infarction at the age of 78.  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  He has no known allergies.    The rest of his review of systems is completely negative.    Past History   History reviewed. No pertinent past medical history.  History reviewed. No pertinent surgical history.  Social History     Tobacco Use   • Smoking status: Never Smoker   • Smokeless tobacco: Never Used   Vaping Use   • Vaping Use: Never used   Substance Use Topics   • Alcohol use: Yes     Comment: rarely   • Drug use: Never     History reviewed. No pertinent family history.  Allergies/Medications   Allergies: Patient has no known allergies.    Current Medications:   Outpatient Encounter Medications as of 1/28/2022:   •  amLODIPine (NORVASC) 10 mg tablet,   •  aspirin 81 mg enteric coated tablet, Take 81 mg by mouth.  •  aspirin-dipyridamole (AGGRENOX)  mg per 12 hr capsule, Take 1 capsule by mouth 2 (two) times a day.  •  furosemide (LASIX) 20 mg tablet,   •  glipiZIDE (GLUCOTROL) 5 mg tablet, 2 (two) times a day with meals.    •  hydrochlorothiazide (MICROZIDE) 12.5 mg capsule,   •  JANUVIA 100 mg tablet,   •  lisinopriL (PRINIVIL) 40 mg tablet,   •  metFORMIN (GLUCOPHAGE) 500 mg tablet, 1,000 mg 2 (two) times a day with meals.    •  metoprolol succinate XL (TOPROL-XL) 100 mg 24 hr tablet, 2 (two) times a day.    •  spironolactone (ALDACTONE) 25 mg tablet,   •  tamsulosin (FLOMAX) 0.4 mg capsule,   •  vit A/vit C/vit E/zinc/copper (PRESERVISION AREDS ORAL), Take by mouth 2 times daily.  •  ZINC ORAL, Take 50 mg by mouth.  •  alfuzosin (UROXATRAL) 10 mg 24 hr tablet,   •  EPINEPHrine (EPIPEN) 0.3 mg/0.3 mL injection syringe, into the thigh.  •  metoprolol succinate XL (TOPROL-XL) 25 mg 24 hr tablet,   •  metoprolol succinate XL (TOPROL-XL) 50 mg 24 hr tablet,   •  nitroglycerin (NITROSTAT) 0.4 mg SL tablet,   •  ONETOUCH ULTRA TEST strip,      ROS   The rest of his review of systems is completely  "negative.    Vitals:    01/28/22 1104   BP: 120/80   BP Location: Left upper arm   Patient Position: Sitting   Pulse: 77   Resp: 20   SpO2: 95%   Weight: 122 kg (269 lb)   Height: 1.727 m (5' 8\")     Physical Exam   Blood pressure is 120/80.  Heart rate is 68 and regular.  He is comfortable.  Skin is warm and dry.  Conjunctiva are pink.  Affect is appropriate.  Mild jugular venous distention and hepatojugular reflux.  Carotids have bilateral transmitted murmurs with diminished upstrokes.  Chest is clear.  Regular rhythm with distant heart tones.  Harsh systolic murmur at the aortic area that radiates to the neck and across the precordium.  S2 is muffled.  I heard no diastolic sounds.  Abdomen is obese and soft with good bowel sounds.  No obvious organomegaly.  No clubbing, or cyanosis.  No rash  I felt no distal pulses.  Labs/ Imaging/Procedures         EKG  Atrial fibrillation with a controlled ventricular response of 66 bpm.  Diffuse inferolateral T wave flattening.  Assessment/Plan     Problem List Items Addressed This Visit        Nervous    Type 2 diabetes mellitus with diabetic neuropathy, unspecified (CMS/HCC) - Primary    Relevant Medications    metFORMIN (GLUCOPHAGE) 500 mg tablet    JANUVIA 100 mg tablet    glipiZIDE (GLUCOTROL) 5 mg tablet    Other Relevant Orders    ECG 12 lead (Completed)       Circulatory    Essential hypertension    Relevant Medications    hydrochlorothiazide (MICROZIDE) 12.5 mg capsule    spironolactone (ALDACTONE) 25 mg tablet    furosemide (LASIX) 20 mg tablet    amLODIPine (NORVASC) 10 mg tablet    metoprolol succinate XL (TOPROL-XL) 50 mg 24 hr tablet    metoprolol succinate XL (TOPROL-XL) 25 mg 24 hr tablet    metoprolol succinate XL (TOPROL-XL) 100 mg 24 hr tablet    lisinopriL (PRINIVIL) 40 mg tablet    Other Relevant Orders    ECG 12 lead (Completed)    Atrial fibrillation (CMS/HCC)    Relevant Medications    nitroglycerin (NITROSTAT) 0.4 mg SL tablet    aspirin 81 mg enteric " coated tablet    amLODIPine (NORVASC) 10 mg tablet    metoprolol succinate XL (TOPROL-XL) 50 mg 24 hr tablet    metoprolol succinate XL (TOPROL-XL) 25 mg 24 hr tablet    metoprolol succinate XL (TOPROL-XL) 100 mg 24 hr tablet    lisinopriL (PRINIVIL) 40 mg tablet    EPINEPHrine (EPIPEN) 0.3 mg/0.3 mL injection syringe    aspirin-dipyridamole (AGGRENOX)  mg per 12 hr capsule       Endocrine/Metabolic    Pure hypercholesterolemia    Relevant Orders    ECG 12 lead (Completed)      Other Visit Diagnoses     Nonrheumatic aortic valve stenosis        Relevant Medications    nitroglycerin (NITROSTAT) 0.4 mg SL tablet    aspirin 81 mg enteric coated tablet    amLODIPine (NORVASC) 10 mg tablet    metoprolol succinate XL (TOPROL-XL) 50 mg 24 hr tablet    metoprolol succinate XL (TOPROL-XL) 25 mg 24 hr tablet    metoprolol succinate XL (TOPROL-XL) 100 mg 24 hr tablet    lisinopriL (PRINIVIL) 40 mg tablet    EPINEPHrine (EPIPEN) 0.3 mg/0.3 mL injection syringe    aspirin-dipyridamole (AGGRENOX)  mg per 12 hr capsule    Other Relevant Orders    ECG 12 lead (Completed)          Impression:     Elbert certainly does have severe aortic stenosis.  I do not think he moves enough to really become significantly short of breath.  I  think it is time for a more aggressive evaluation.  I do think his best option is also a TAVR.  His other comorbidities would increase the risk of traditional aortic valve replacement.    He will be seeing Dr. Roge Pedroza in the near future.  TAVR CTA will be done as well as another cardiac catheterization.    Today he is in atrial fibrillation.  He is never known of this prior to his evaluation today.  His heart rates are controlled.  I will start him on Eliquis 5 mg twice daily.    He is on Repatha for his hyperlipidemia.  He could not tolerate statins.  He tells me that his family physician has a lipid profile pending.    Today his blood pressure is well controlled.    I am highly suspect that he  has severe sleep apnea.  It would be my strong suggestion that he consider having a study done.  This is probably the biggest culprit for his elevated right heart pressures.    His follow-up will depend on the outcome of his consultation with TAVR clinic.    Thank you for allowing me to participate in the care of this patient.  I hope this information is helpful.  Please do not hesitate to contact me with any questions or concerns.      Susanna Miller MD FACC, FACP  1/28/2022

## 2022-01-28 NOTE — PROGRESS NOTES
"                     Cardiology Consult/New Patient     Patient ID: Yared Hensley 71 y.o. male. 1950  PCP: Ángel Cristobal MD   History Present Illness     HPI:           Dear Dr. Cristobal,    On January 28, 2022 I met Dr. Yared Ayden in the office for the first time as a patient.  He has been a longtime friend since my days at [place].  He comes to see me now for evaluation of aortic stenosis.  In 2019 he had a cardiac catheterization done at [facility] where he was found to have patent coronary arteries and was told that by echo he had mild aortic stenosis.  His right heart pressures were elevated at 45/25 with a right atrial pressure of 16.  His wedge was 25.  At that time he was reassured that he should continue medical therapy.    He has a history of diabetes, hypertension, and BPH.  His knees give him significant problems and he tells me he cannot walk because of his knees.  He does get short of breath with minimal activity.  He denies chest pain, lightheadedness, palpitations or syncope.  Twice he has had episodes where in the evening hours he would get \"anxious\" and then become extraordinarily short of breath.  This would happen on both occasions when preparing for bed.  Both episodes lasted very briefly.    He denies thyroid disease, kidney disease, liver disease, neurologic disease or rheumatologic disease.    He does not smoke.  He drinks minimally.  His father had a myocardial infarction at the age of 78.  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  He has no known allergies.    The rest of his review of systems is completely negative.    Past History   History reviewed. No pertinent past medical history.  History reviewed. No pertinent surgical history.  Social History     Tobacco Use   • Smoking status: Never Smoker   • Smokeless tobacco: Never Used   Vaping Use   • Vaping Use: Never used   Substance Use Topics   • Alcohol use: Yes     Comment: rarely   • Drug use: " "Never     History reviewed. No pertinent family history.  Allergies/Medications   Allergies: Patient has no known allergies.    Current Medications:   Outpatient Encounter Medications as of 1/28/2022:   •  amLODIPine (NORVASC) 10 mg tablet,   •  aspirin 81 mg enteric coated tablet, Take 81 mg by mouth.  •  aspirin-dipyridamole (AGGRENOX)  mg per 12 hr capsule, Take 1 capsule by mouth 2 (two) times a day.  •  furosemide (LASIX) 20 mg tablet,   •  glipiZIDE (GLUCOTROL) 5 mg tablet, 2 (two) times a day with meals.    •  hydrochlorothiazide (MICROZIDE) 12.5 mg capsule,   •  JANUVIA 100 mg tablet,   •  lisinopriL (PRINIVIL) 40 mg tablet,   •  metFORMIN (GLUCOPHAGE) 500 mg tablet, 1,000 mg 2 (two) times a day with meals.    •  metoprolol succinate XL (TOPROL-XL) 100 mg 24 hr tablet, 2 (two) times a day.    •  spironolactone (ALDACTONE) 25 mg tablet,   •  tamsulosin (FLOMAX) 0.4 mg capsule,   •  vit A/vit C/vit E/zinc/copper (PRESERVISION AREDS ORAL), Take by mouth 2 times daily.  •  ZINC ORAL, Take 50 mg by mouth.  •  alfuzosin (UROXATRAL) 10 mg 24 hr tablet,   •  EPINEPHrine (EPIPEN) 0.3 mg/0.3 mL injection syringe, into the thigh.  •  metoprolol succinate XL (TOPROL-XL) 25 mg 24 hr tablet,   •  metoprolol succinate XL (TOPROL-XL) 50 mg 24 hr tablet,   •  nitroglycerin (NITROSTAT) 0.4 mg SL tablet,   •  ONETOUCH ULTRA TEST strip,      ROS   The rest of his review of systems is completely negative.    Vitals:    01/28/22 1104   BP: 120/80   BP Location: Left upper arm   Patient Position: Sitting   Pulse: 77   Resp: 20   SpO2: 95%   Weight: 122 kg (269 lb)   Height: 1.727 m (5' 8\")     Physical Exam   Blood pressure is 120/80.  Heart rate is 68 and regular.  He is comfortable.  Skin is warm and dry.  Conjunctiva are pink.  Affect is appropriate.  Mild jugular venous distention and hepatojugular reflux.  Carotids have bilateral transmitted murmurs with diminished upstrokes.  Chest is clear.  Regular rhythm with " distant heart tones.  Harsh systolic murmur at the aortic area that radiates to the neck and across the precordium.  S2 is muffled.  I heard no diastolic sounds.  Abdomen is obese and soft with good bowel sounds.  No obvious organomegaly.  No clubbing, or cyanosis.  No rash  I felt no distal pulses.  Labs/ Imaging/Procedures         EKG  Atrial fibrillation with a controlled ventricular response of 66 bpm.  Diffuse inferolateral T wave flattening.  Assessment/Plan     Problem List Items Addressed This Visit        Nervous    Type 2 diabetes mellitus with diabetic neuropathy, unspecified (CMS/HCC) - Primary    Relevant Medications    metFORMIN (GLUCOPHAGE) 500 mg tablet    JANUVIA 100 mg tablet    glipiZIDE (GLUCOTROL) 5 mg tablet    Other Relevant Orders    ECG 12 lead (Completed)       Circulatory    Essential hypertension    Relevant Medications    hydrochlorothiazide (MICROZIDE) 12.5 mg capsule    spironolactone (ALDACTONE) 25 mg tablet    furosemide (LASIX) 20 mg tablet    amLODIPine (NORVASC) 10 mg tablet    metoprolol succinate XL (TOPROL-XL) 50 mg 24 hr tablet    metoprolol succinate XL (TOPROL-XL) 25 mg 24 hr tablet    metoprolol succinate XL (TOPROL-XL) 100 mg 24 hr tablet    lisinopriL (PRINIVIL) 40 mg tablet    Other Relevant Orders    ECG 12 lead (Completed)    Atrial fibrillation (CMS/HCC)    Relevant Medications    nitroglycerin (NITROSTAT) 0.4 mg SL tablet    aspirin 81 mg enteric coated tablet    amLODIPine (NORVASC) 10 mg tablet    metoprolol succinate XL (TOPROL-XL) 50 mg 24 hr tablet    metoprolol succinate XL (TOPROL-XL) 25 mg 24 hr tablet    metoprolol succinate XL (TOPROL-XL) 100 mg 24 hr tablet    lisinopriL (PRINIVIL) 40 mg tablet    EPINEPHrine (EPIPEN) 0.3 mg/0.3 mL injection syringe    aspirin-dipyridamole (AGGRENOX)  mg per 12 hr capsule       Endocrine/Metabolic    Pure hypercholesterolemia    Relevant Orders    ECG 12 lead (Completed)      Other Visit Diagnoses     Nonrheumatic  aortic valve stenosis        Relevant Medications    nitroglycerin (NITROSTAT) 0.4 mg SL tablet    aspirin 81 mg enteric coated tablet    amLODIPine (NORVASC) 10 mg tablet    metoprolol succinate XL (TOPROL-XL) 50 mg 24 hr tablet    metoprolol succinate XL (TOPROL-XL) 25 mg 24 hr tablet    metoprolol succinate XL (TOPROL-XL) 100 mg 24 hr tablet    lisinopriL (PRINIVIL) 40 mg tablet    EPINEPHrine (EPIPEN) 0.3 mg/0.3 mL injection syringe    aspirin-dipyridamole (AGGRENOX)  mg per 12 hr capsule    Other Relevant Orders    ECG 12 lead (Completed)          Impression:     Elbert certainly does have severe aortic stenosis.  I do not think he moves enough to really become significantly short of breath.  I  think it is time for a more aggressive evaluation.  I do think his best option is also a TAVR.  His other comorbidities would increase the risk of traditional aortic valve replacement.    He will be seeing Dr. Roge Pedroza in the near future.  TAVR CTA will be done as well as another cardiac catheterization.    Today he is in atrial fibrillation.  He is never known of this prior to his evaluation today.  His heart rates are controlled.  I will start him on Eliquis 5 mg twice daily.    He is on Repatha for his hyperlipidemia.  He could not tolerate statins.  He tells me that his family physician has a lipid profile pending.    Today his blood pressure is well controlled.    I am highly suspect that he has severe sleep apnea.  It would be my strong suggestion that he consider having a study done.  This is probably the biggest culprit for his elevated right heart pressures.    His follow-up will depend on the outcome of his consultation with TAVR clinic.    Thank you for allowing me to participate in the care of this patient.  I hope this information is helpful.  Please do not hesitate to contact me with any questions or concerns.      Susanna Miller MD FAC, FACP  1/28/2022

## 2022-02-02 ENCOUNTER — TELEPHONE (OUTPATIENT)
Dept: CARDIOLOGY | Facility: CLINIC | Age: 72
End: 2022-02-02
Payer: COMMERCIAL

## 2022-02-02 NOTE — TELEPHONE ENCOUNTER
Pre cert team does not handle the auth for this type of testing.     This encounter needs to be forwarded to the surgical scheduler.

## 2022-02-02 NOTE — TELEPHONE ENCOUNTER
Pt needs a pre-cert for a CTA TAVR WITH AND WITHOUT IV CONTRAST scheduled for February 8, 2022 at [facility]     NPI: [id number]

## 2022-02-08 ENCOUNTER — DOCUMENTATION (OUTPATIENT)
Dept: CARDIOTHORACIC SURGERY | Facility: CLINIC | Age: 72
End: 2022-02-08
Payer: COMMERCIAL

## 2022-02-08 ENCOUNTER — HOSPITAL ENCOUNTER (OUTPATIENT)
Dept: RADIOLOGY | Facility: HOSPITAL | Age: 72
Discharge: HOME | End: 2022-02-08
Attending: PHYSICIAN ASSISTANT
Payer: COMMERCIAL

## 2022-02-08 ENCOUNTER — OFFICE VISIT (OUTPATIENT)
Dept: CARDIOLOGY | Facility: CLINIC | Age: 72
End: 2022-02-08
Payer: COMMERCIAL

## 2022-02-08 VITALS
OXYGEN SATURATION: 97 % | SYSTOLIC BLOOD PRESSURE: 120 MMHG | HEIGHT: 68 IN | WEIGHT: 268.5 LBS | RESPIRATION RATE: 18 BRPM | HEART RATE: 65 BPM | BODY MASS INDEX: 40.69 KG/M2 | DIASTOLIC BLOOD PRESSURE: 72 MMHG

## 2022-02-08 DIAGNOSIS — I35.0 AORTIC VALVE STENOSIS, ETIOLOGY OF CARDIAC VALVE DISEASE UNSPECIFIED: ICD-10-CM

## 2022-02-08 DIAGNOSIS — Z11.59 SCREENING FOR VIRAL DISEASE: ICD-10-CM

## 2022-02-08 DIAGNOSIS — I10 ESSENTIAL HYPERTENSION: Primary | ICD-10-CM

## 2022-02-08 DIAGNOSIS — I35.0 NONRHEUMATIC AORTIC VALVE STENOSIS: Primary | ICD-10-CM

## 2022-02-08 PROCEDURE — 63600105 HC IODINE BASED CONTRAST: Mod: JW | Performed by: PHYSICIAN ASSISTANT

## 2022-02-08 PROCEDURE — 99205 OFFICE O/P NEW HI 60 MIN: CPT | Performed by: INTERNAL MEDICINE

## 2022-02-08 PROCEDURE — 71275 CT ANGIOGRAPHY CHEST: CPT | Mod: MG

## 2022-02-08 PROCEDURE — 93000 ELECTROCARDIOGRAM COMPLETE: CPT | Performed by: INTERNAL MEDICINE

## 2022-02-08 PROCEDURE — 3008F BODY MASS INDEX DOCD: CPT | Performed by: INTERNAL MEDICINE

## 2022-02-08 RX ADMIN — IOHEXOL 80 ML: 350 INJECTION, SOLUTION INTRAVENOUS at 11:10

## 2022-02-08 NOTE — PROGRESS NOTES
Dr Pedroza asked Dr Irwin to see Dr Hensley for a TAVR evaluation. He was referred by Dr Miller. He had his TAVR CT today. He will need a cardiac cath and prefers to have this with Dr Pedroza.  After his cath is completed will have him return for full consultation with Dr Irwin. Will have him get his carotid ultrasound and PATs on the day of his consultation as he lives a 2 hour drive from our campus.

## 2022-02-08 NOTE — PROGRESS NOTES
"  Subjective     Patient ID: Yared Hensley is a 71 y.o. male.    HPI    Review of Systems    Objective     Vitals:    02/08/22 1202   BP: 120/72   BP Location: Left upper arm   Patient Position: Sitting   Pulse: 65   Resp: 18   SpO2: 97%   Weight: 122 kg (268 lb 8 oz)   Height: 1.727 m (5' 8\")     Body mass index is 40.83 kg/m².    Physical Exam Physical Exam     General Appearance:  Alert, no distress   Head:  Normocephalic, without obvious abnormality, atraumatic   Eyes:  Conjunctiva/corneas clear, EOM's intact   Neck: No thyroid enlargement. No JVD. No bruits    Lungs:   Clear to auscultation bilaterally   Heart:  Regular rhythm. Normal S1 S2. murmurs.   Abdomen:   Soft, non-tender, no masses, no organomegaly   Vascular: Pulses 2+ and symmetric all extremities, no carotid bruit or jugular vein distention   Musculoskeletal:  Skin: No injury or deformity  Skin color, texture, turgor normal, no rashes or lesions, no cyanosis    Extremities: No edema   Behavior/Emotional: Appropriate, cooperative   Neurologic:                          Awake, Alert and Oriented x3,No focal deficit      Assessment/Plan   Diagnoses and all orders for this visit:    Essential hypertension (Primary)  -     ECG 12 LEAD-OFFICE PERFORMED      Noel Shine TF  So far  Needs cath workukp      "

## 2022-02-11 ENCOUNTER — TELEPHONE (OUTPATIENT)
Dept: CARDIOTHORACIC SURGERY | Facility: CLINIC | Age: 72
End: 2022-02-11
Payer: COMMERCIAL

## 2022-02-11 NOTE — TELEPHONE ENCOUNTER
Spoke to his primary cardiologist Dr. Streeter as well as left a voice mail on the patients home number explaining the plan moving forward.  Called patients cell phone and voice mail is full.

## 2022-02-11 NOTE — TELEPHONE ENCOUNTER
Orders are in for a LHC with [facility]. Can you please arrange please. Thanks

## 2022-02-14 ENCOUNTER — TELEPHONE (OUTPATIENT)
Dept: CARDIOTHORACIC SURGERY | Facility: CLINIC | Age: 72
End: 2022-02-14
Payer: COMMERCIAL

## 2022-02-14 PROBLEM — I35.0 NONRHEUMATIC AORTIC VALVE STENOSIS: Status: ACTIVE | Noted: 2022-02-14

## 2022-02-14 NOTE — TELEPHONE ENCOUNTER
Spoke to the pt and scheduled LHC with Dr Pedroza on 2/25/2022. Pt has questions he would like to ask you. Please call pt at [phone].

## 2022-02-14 NOTE — TELEPHONE ENCOUNTER
Was informed by Livia that the patient had questions when she just spoke to him regarding scheduling his cath. Called but no answer and voice mail is full. Will try again.

## 2022-02-15 DIAGNOSIS — I35.0 NONRHEUMATIC AORTIC VALVE STENOSIS: Primary | ICD-10-CM

## 2022-02-16 NOTE — TELEPHONE ENCOUNTER
I spoke with patient and he confirmed appointments on 03/22. He will bring a disc of carotid to us on 3/22. He will get COVID done on 2/25 at [facility] and I will await dental clearance. I emailed him everything.

## 2022-02-16 NOTE — TELEPHONE ENCOUNTER
Javier hussein, can you please add a consult with Dr. Irwin on 3/22 at the 9:30 slot? I will make patient aware of all appointments.

## 2022-02-16 NOTE — TELEPHONE ENCOUNTER
I called patient to go over his appts for consult, PAT and carotids and he is asking for a callback. He wants to know why he has to wait so long for surgery (3/30). He remembers meeting Melissa and would like a call back from you. I currently have him scheduled for 3/22 for consult, PATs and carotid. Thanks!

## 2022-02-16 NOTE — TELEPHONE ENCOUNTER
Spoke with patient. Explained that if her would like a sooner date with Dr Solomon or Dr Dawson that we could schedule. He prefers to have DR Irwin for his TAVR. He would like to schedule his carotid ultrasound with his provider he has been seen by in the past.  He will need to have the report of study sent to us then we can cancel the CUS he has scheduled here on 3/22.

## 2022-02-22 ENCOUNTER — TELEPHONE (OUTPATIENT)
Dept: CARDIOTHORACIC SURGERY | Facility: CLINIC | Age: 72
End: 2022-02-22
Payer: COMMERCIAL

## 2022-02-22 NOTE — TELEPHONE ENCOUNTER
Patient called to ask about cath this coming Friday. He would like it to be later in the morning if possible.  I told him the cath lab would call with further instructions.

## 2022-02-25 ENCOUNTER — HOSPITAL ENCOUNTER (OUTPATIENT)
Facility: HOSPITAL | Age: 72
Setting detail: HOSPITAL OUTPATIENT SURGERY
Discharge: HOME | End: 2022-02-25
Attending: INTERNAL MEDICINE | Admitting: INTERNAL MEDICINE
Payer: COMMERCIAL

## 2022-02-25 VITALS
HEART RATE: 66 BPM | HEIGHT: 68 IN | RESPIRATION RATE: 12 BRPM | WEIGHT: 268 LBS | SYSTOLIC BLOOD PRESSURE: 156 MMHG | DIASTOLIC BLOOD PRESSURE: 73 MMHG | BODY MASS INDEX: 40.62 KG/M2 | OXYGEN SATURATION: 94 %

## 2022-02-25 DIAGNOSIS — I35.0 NONRHEUMATIC AORTIC VALVE STENOSIS: ICD-10-CM

## 2022-02-25 LAB
GLUCOSE BLD-MCNC: 133 MG/DL (ref 70–99)
POCT TEST: ABNORMAL

## 2022-02-25 PROCEDURE — 71000001 HC PACU PHASE 1 INITIAL 30MIN: Performed by: INTERNAL MEDICINE

## 2022-02-25 PROCEDURE — 99152 MOD SED SAME PHYS/QHP 5/>YRS: CPT | Performed by: INTERNAL MEDICINE

## 2022-02-25 PROCEDURE — 71000011 HC PACU PHASE 1 EA ADDL MIN: Performed by: INTERNAL MEDICINE

## 2022-02-25 PROCEDURE — B2111ZZ FLUOROSCOPY OF MULTIPLE CORONARY ARTERIES USING LOW OSMOLAR CONTRAST: ICD-10-PCS | Performed by: INTERNAL MEDICINE

## 2022-02-25 PROCEDURE — 63600000 HC DRUGS/DETAIL CODE: Performed by: INTERNAL MEDICINE

## 2022-02-25 PROCEDURE — 27200000 HC STERILE SUPPLY: Performed by: INTERNAL MEDICINE

## 2022-02-25 PROCEDURE — 93458 L HRT ARTERY/VENTRICLE ANGIO: CPT | Performed by: INTERNAL MEDICINE

## 2022-02-25 PROCEDURE — C1894 INTRO/SHEATH, NON-LASER: HCPCS | Performed by: INTERNAL MEDICINE

## 2022-02-25 PROCEDURE — 4A023N7 MEASUREMENT OF CARDIAC SAMPLING AND PRESSURE, LEFT HEART, PERCUTANEOUS APPROACH: ICD-10-PCS | Performed by: INTERNAL MEDICINE

## 2022-02-25 PROCEDURE — 93458 L HRT ARTERY/VENTRICLE ANGIO: CPT | Mod: 26 | Performed by: INTERNAL MEDICINE

## 2022-02-25 PROCEDURE — 63600105 HC IODINE BASED CONTRAST: Mod: JW | Performed by: INTERNAL MEDICINE

## 2022-02-25 PROCEDURE — 25000000 HC PHARMACY GENERAL: Performed by: INTERNAL MEDICINE

## 2022-02-25 PROCEDURE — C1769 GUIDE WIRE: HCPCS | Performed by: INTERNAL MEDICINE

## 2022-02-25 PROCEDURE — 63700000 HC SELF-ADMINISTRABLE DRUG: Performed by: INTERNAL MEDICINE

## 2022-02-25 RX ORDER — HEPARIN SODIUM 1000 [USP'U]/ML
INJECTION, SOLUTION INTRAVENOUS; SUBCUTANEOUS
Status: DISCONTINUED | OUTPATIENT
Start: 2022-02-25 | End: 2022-02-25 | Stop reason: HOSPADM

## 2022-02-25 RX ORDER — LIDOCAINE HYDROCHLORIDE 10 MG/ML
INJECTION, SOLUTION INFILTRATION; PERINEURAL
Status: DISCONTINUED | OUTPATIENT
Start: 2022-02-25 | End: 2022-02-25 | Stop reason: HOSPADM

## 2022-02-25 RX ORDER — FENTANYL CITRATE 50 UG/ML
INJECTION, SOLUTION INTRAMUSCULAR; INTRAVENOUS
Status: DISCONTINUED | OUTPATIENT
Start: 2022-02-25 | End: 2022-02-25 | Stop reason: HOSPADM

## 2022-02-25 RX ORDER — NAPROXEN SODIUM 220 MG/1
81 TABLET, FILM COATED ORAL ONCE
Status: COMPLETED | OUTPATIENT
Start: 2022-02-25 | End: 2022-02-25

## 2022-02-25 RX ORDER — MIDAZOLAM HYDROCHLORIDE 2 MG/2ML
INJECTION, SOLUTION INTRAMUSCULAR; INTRAVENOUS
Status: DISCONTINUED | OUTPATIENT
Start: 2022-02-25 | End: 2022-02-25 | Stop reason: HOSPADM

## 2022-02-25 RX ORDER — IODIXANOL 320 MG/ML
INJECTION, SOLUTION INTRAVASCULAR
Status: DISCONTINUED | OUTPATIENT
Start: 2022-02-25 | End: 2022-02-25 | Stop reason: HOSPADM

## 2022-02-25 RX ADMIN — ASPIRIN 81 MG: 81 TABLET, CHEWABLE ORAL at 10:02

## 2022-02-25 NOTE — OR SURGEON
Pre-Procedure patient identification:  I am the primary operating surgeon/proceduralist and I have identified the patient on 02/25/22 at 9:52 AM Roge Pedroza DO  Phone Number: [phone]

## 2022-02-25 NOTE — Clinical Note
Universal procedure checklist completed. Airway assessment completed. Physician agrees with pre-sedation assessment..

## 2022-02-25 NOTE — DISCHARGE INSTRUCTIONS
· Post cardiac catheterization instructions:  · No driving, operating heavy machinery, making critical decisions or activities that require balance for 24 hours.  This is because of sedation you received for your procedure.  · On day of discharge, limit activities.  · No heavy lifting greater than 10 lbs for the next 2 days after procedure.    · Remove dressing next day. Keep site clean and dry.  · May shower next day of procedure. Do not submerge catherization site in pool or tub baths for 5 days  · Call if  swelling, bleeding, fever or drainage from catheterization site        Medications: Take medications as directed.  Call your physician in any questions or concerns    Do not take Metformin for 48 hours. May resume at usual dose on 2/28/22  Resume Eliquis tomorrow 2/26/22        Follow up with Dr. Miller   [phone]  Follow up in 2 weeks

## 2022-02-25 NOTE — PRE-PROCEDURE NOTE
Cardiac Cath Lab Pre-procedure Note    - Patient was seen and examined at bedside.  - The patient's chart and all data was reviewed.  - The procedure, treatment alternatives, risks and benefits were explained with specific risks discussed.  - Patient was consented for cardiac cath procedure and possible PCI.  - Patient's case was found appropriate for dual antiplatelet therapy.    Patient's clinical presentation to the cardiac cath lab: pre-TAVR.     Patient is at risk for stroke due to extensive atherosclerotic vascular disease; acute myocardial infarction; vascular complications due to the presence of severe peripheral arterial disease in the iliac/femoral vessels increasing the risk of hematoma, retroperitoneal bleeding, pseudo-aneurysms and arteriovenous fistula formation; bleeding and emergency cardiac surgery.   Patient appears to be managing well.                 Pre-sedation assessment  ASA 3   Mallampati class: III - soft palate, base of uvula visible.

## 2022-03-07 ENCOUNTER — DOCUMENTATION (OUTPATIENT)
Dept: CARDIOTHORACIC SURGERY | Facility: CLINIC | Age: 72
End: 2022-03-07
Payer: COMMERCIAL

## 2022-03-07 NOTE — PROGRESS NOTES
I, Woodrow Irwin MD, personally performed the services described in this documentation as scribed by CONOR Daniels in my presence, and it is both accurate and complete.    3/23/2022 6:54 AM    Cardiac Surgery    Reason for consultation:Aortic Stenosis    HPI  Patient is a 71 y.o. male here for a surgical consultation for Aortic Stenosis.  They were referred by Dr. Miller. Dr. Hensley is here today by himself.  He currently has symptoms of fatigue. He has difficulty exerting himself due to his arthritic knees. He was recently at [place] with family and unable to walk the park and spent the day in his hotel room. He initially attributed this to his knee pain. However, believes that his fatigue may have been more related to his advancing aortic stenosis. These symptoms have been present for approximately a month and have gotten worse. Associated symptoms of LE edema. He rates his symptoms as moderate. He denies chest pain, dizziness lightheadedness, or syncope.  PMH: Hypertension, hyperlipidemia, A. fib, non-insulin-dependent diabetes, and obesity. 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     PCP: Dr Ángel Cristobal  Cardiologist: Dr. Miller    Medical History:   Past Medical History:   Diagnosis Date   • Atrial fibrillation (CMS/HCC) 1/28/2022   • Hyperlipidemia    • Hypertension    • Type 2 diabetes mellitus (CMS/HCC)        Surgical History: History reviewed. No pertinent surgical history.    Allergies: Patient has no known allergies.    Current Outpatient Medications   Medication Sig Dispense Refill   • alfuzosin (UROXATRAL) 10 mg 24 hr tablet      • amLODIPine (NORVASC) 10 mg tablet      • apixaban (ELIQUIS) 5 mg tablet Take 1 tablet (5 mg total) by mouth 2 (two) times a day. 180 tablet 1   • furosemide (LASIX) 20 mg tablet as needed.     • glipiZIDE (GLUCOTROL) 5 mg tablet 2 (two) times a day with meals.       • hydrochlorothiazide (MICROZIDE) 12.5 mg capsule      • JANUVIA 100 mg tablet      •  lisinopriL (PRINIVIL) 40 mg tablet 2 (two) times a day.     • metFORMIN (GLUCOPHAGE) 500 mg tablet 1,000 mg 2 (two) times a day with meals.       • metoprolol succinate XL (TOPROL-XL) 100 mg 24 hr tablet 2 (two) times a day.       • ONETOUCH ULTRA TEST strip      • spironolactone (ALDACTONE) 25 mg tablet      • tamsulosin (FLOMAX) 0.4 mg capsule      • vit A/vit C/vit E/zinc/copper (PRESERVISION AREDS ORAL) Take by mouth 2 times daily.     • ZINC ORAL Take 50 mg by mouth.     • aspirin 81 mg enteric coated tablet Take 81 mg by mouth.     • REPATHA SYRINGE 140 mg/mL syringe syringe INJECT 1 SYRINGE EVERY 2 WEEKS       No current facility-administered medications for this visit.       Social History:   Social History     Socioeconomic History   • Marital status:      Spouse name: None   • Number of children: None   • Years of education: None   • Highest education level: None   Tobacco Use   • Smoking status: Never Smoker   • Smokeless tobacco: Never Used   Vaping Use   • Vaping Use: Never used   Substance and Sexual Activity   • Alcohol use: Yes     Comment: rarely   • Drug use: Never   • Sexual activity: Defer       Family History:   Family History   Problem Relation Age of Onset   • Hypertension Biological Mother    • Heart disease Biological Father    • Hypertension Biological Father      Review of Systems   Constitutional: Negative for fatigue.   HENT: Negative for postnasal drip.    Eyes: Negative for visual disturbance.   Respiratory: Negative for cough, chest tightness and shortness of breath.    Cardiovascular: Positive for leg swelling. Negative for chest pain and palpitations.   Gastrointestinal: Negative for nausea.   Genitourinary: Negative for difficulty urinating.        Nocturia x 3   Musculoskeletal: Positive for arthralgias. Negative for back pain and gait problem.   Skin: Negative for wound.   Neurological: Negative for dizziness, syncope and weakness.   Hematological: Does not bruise/bleed  "easily.   Psychiatric/Behavioral: Negative for agitation, behavioral problems and confusion. The patient is not nervous/anxious.    All other systems reviewed and are negative.    Objective   Vital Signs for the last 24 hours:  Visit Vitals  /72 (BP Location: Left upper arm, Patient Position: Sitting)   Pulse 76   Temp (!) 35.8 °C (96.4 °F) (Temporal)   Resp 18   Ht 1.727 m (5' 8\")   Wt 122 kg (270 lb)   SpO2 96%   BMI 41.05 kg/m²       Physical Exam  Vitals reviewed.   Constitutional:       Appearance: Normal appearance. He is well-developed and normal weight.   HENT:      Head: Normocephalic and atraumatic.      Mouth/Throat:      Mouth: Mucous membranes are dry.   Eyes:      Extraocular Movements: Extraocular movements intact.      Pupils: Pupils are equal, round, and reactive to light.   Neck:      Vascular: No carotid bruit or JVD.   Cardiovascular:      Rate and Rhythm: Normal rate and regular rhythm.      Pulses: Normal pulses.      Heart sounds: Murmur heard.    Systolic murmur is present with a grade of 2/6.     Comments: Holosystolic [place] to base  Pulmonary:      Effort: Pulmonary effort is normal.      Breath sounds: No rales.   Abdominal:      General: Bowel sounds are normal.      Palpations: Abdomen is soft.   Musculoskeletal:         General: Normal range of motion.      Cervical back: Normal range of motion and neck supple. Edema present.      Right lower le+ Edema present.      Left lower le+ Edema present.   Skin:     General: Skin is warm and dry.      Capillary Refill: Capillary refill takes 2 to 3 seconds.   Neurological:      General: No focal deficit present.      Mental Status: He is alert and oriented to person, place, and time. Mental status is at baseline.   Psychiatric:         Mood and Affect: Mood normal.         Behavior: Behavior normal.         Thought Content: Thought content normal.         Judgment: Judgment normal.       Cardiac Imaging  TRANSTHORACIC ECHO (TTE) " COMPLETE 01/28/2022  Interpretation Summary  1.  Normal left ventricular function with an estimated ejection fraction of 65 to 70%.  2.  No segmental wall motion abnormalities.  3.  Moderate concentric left ventricular hypertrophy.  4.  Mildly dilated left atrium.  5.  Mild dilatation of the ascending aorta at 3.9 cm.  6.  Severe aortic stenosis with a mean gradient of 41 mmHg and an aortic valve area of 0.8 cm².  7.  Mild mitral regurgitation.  Evidence of mitral annular calcification.  8.  Trace tricuspid insufficiency with normal pulmonary artery pressures.    Cardiac cath completed on 2/25/2022:L  Findings  Nonobstructive coronary artery disease  Mid LAD with 60% tubular stenosis.   Patent LCx and RCA without any significant disease.      CTA completed on 2/8/2022:  SUMMARY:  1. The aortic valve is trileaflet.  There is thickening and calcification of all  three leaflets with reduced excursion during systole.  The aortic valve calcium  score is 3724 consistent with severe aortic stenosis.  There is mild to moderate  calcification in the LVOT just below the left coronary and noncoronary cusps.  2. Normal left ventricular cavity size with mild concentric left ventricular  hypertrophy.  Normal left ventricular systolic function without segmental wall  motion abnormalities.  Ejection fraction of 63%.  3. Normal right ventricular size and function.  4. Mildly dilated left atrium  5. Mild mitral annular calcification.  Focally calcified mitral valve leaflets  with normal opening.  6. Probably mild nonobstructive coronary artery disease.  Assessment of the  right coronary artery was limited by motion artifact. CACS: 1531. See above  report for further details of coronary anatomy.    IMPRESSION:  CTA TAVR assessments and measurements as described above.  Ectatic ascending aorta is noted, measures 4.2 cm in diameter at the level of  right pulmonary artery.  Main pulmonary artery measures 3.6 cm in diameter, may represent  pulmonary  Hypertension.                Calculated Risk Scores          [facility] Adult Cardiac Surgery Data Base Version 4.2               Procedure: Isolated AVR    Risk of Mortality  1.119%   Renal Failure  1.920%   Permanent Stroke  0.431%   Prolonged Ventilation  4.780%   DSW Infection  0.197%   Reoperation  2.490%   Morbidity or Mortality  8.239%   Short Length of Stay  46.389%   Long Length of Stay  3.330%     Comments: preliminary Risk              Calculated Risk Scores          [facility] Adult Cardiac Surgery Data Base Version 4.2               Procedure: AVR + CAB    Risk of Mortality  1.846%   Renal Failure  2.653%   Permanent Stroke  1.210%   Prolonged Ventilation  8.156%   DSW Infection  0.329%   Reoperation  2.904%   Morbidity or Mortality  12.393%   Short Length of Stay  36.217%   Long Length of Stay  5.857%         Assessment/Plan   Aortic stenosis  Echo, cath and CTA personally reviewed by Dr. Irwin and Dr Bolden  Severe calcific aortic stenosis symptomatic with LE edema,  and fatigue.  Aortic valve is degenerative in nature and Tricuspid aortic valve.  KRISHNA 0.8, mean gradient 41mmHg, velocity 4.15m/sec, mild MR, trace TR,  EF 65 %.  Symptoms managed on daily diuretics.  NYHC II-III. No recent hospitalization for CHF.  No chest discomfort. No syncopal or presyncopal episodes.  The patient is tolerating medical therapy.  Nonobstructive CAD.  Patent femoral iliac vessels. Calcium score of the aortic valve 4794.  Plan:  STS risk for a surgical AVR intervention is 1.19%.  Patient is not frail and passes frailty exam.  Patient risk per MD assessment is Low .  Recommend proceeding with a TF TAVR.  Patient is in agreement based on a personal decision to increase quality of life  Patient is requesting that the [place] device be used for embolization protection.     Carotid ultrasound is scheduled for 3/24 and PATs will be completed today.    A Surgery date of:   3/30/22 has been offered and accepted by patient.   The Accurate trial was contemplated. However, his valve area is too big.      Will need to stop Lisinopril 5 days prior to surgery. The mupirocin prescription has been sent to the pharmacy and will begin 5 days in advance of surgery as ordered.     AFIB:  Managed with Eliquis and metoprolol      Plan:  Will stop Eliquis 2 days before surgery and will restart post op in place of Coumadin.         I have discussed with the patient the rationale for procedures as well as possible alternatives.  We discussed potential risks and complications associated with this procedure. The risks include, but are not limited to: bleeding, infection, arrhythmias, myocardial infarction, para-valvular insufficiency,  stroke, death, renal as well as pulmonary insufficiency and the possibility of blood transfusions,  permanent pacemaker placement intra-aortic balloon placement,  assist device placement, cardiac wire perforation, and requirement of CPB.   Time was spent educating the patient about their heart disease diagnosis and treatment options. All questions and concerns were addressed.The patient understands and agrees to proceed.    IMelissa CRNP am scribing for and in the presence of Dr. Irwin

## 2022-03-07 NOTE — PROGRESS NOTES
Calculated Risk Scores          [facility] Adult Cardiac Surgery Data Base Version 4.2               Procedure: Isolated AVR    Risk of Mortality  1.119%   Renal Failure  1.920%   Permanent Stroke  0.431%   Prolonged Ventilation  4.780%   DSW Infection  0.197%   Reoperation  2.490%   Morbidity or Mortality  8.239%   Short Length of Stay  46.389%   Long Length of Stay  3.330%     Comments: preliminary Risk              Calculated Risk Scores          [facility] Adult Cardiac Surgery Data Base Version 4.2               Procedure: AVR + CAB    Risk of Mortality  1.846%   Renal Failure  2.653%   Permanent Stroke  1.210%   Prolonged Ventilation  8.156%   DSW Infection  0.329%   Reoperation  2.904%   Morbidity or Mortality  12.393%   Short Length of Stay  36.217%   Long Length of Stay  5.857%

## 2022-03-07 NOTE — H&P (VIEW-ONLY)
I, Woodrow Irwin MD, personally performed the services described in this documentation as scribed by CONOR Daniels in my presence, and it is both accurate and complete.    3/23/2022 6:54 AM    Cardiac Surgery    Reason for consultation:Aortic Stenosis    HPI  Patient is a 71 y.o. male here for a surgical consultation for Aortic Stenosis.  They were referred by Dr. Miller. Dr. Hensley is here today by himself.  He currently has symptoms of fatigue. He has difficulty exerting himself due to his arthritic knees. He was recently at [place] with family and unable to walk the park and spent the day in his hotel room. He initially attributed this to his knee pain. However, believes that his fatigue may have been more related to his advancing aortic stenosis. These symptoms have been present for approximately a month and have gotten worse. Associated symptoms of LE edema. He rates his symptoms as moderate. He denies chest pain, dizziness lightheadedness, or syncope.  PMH: Hypertension, hyperlipidemia, A. fib, non-insulin-dependent diabetes, and obesity. 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     PCP: Dr Ángel Cristobal  Cardiologist: Dr. Mliler    Medical History:   Past Medical History:   Diagnosis Date   • Atrial fibrillation (CMS/HCC) 1/28/2022   • Hyperlipidemia    • Hypertension    • Type 2 diabetes mellitus (CMS/HCC)        Surgical History: History reviewed. No pertinent surgical history.    Allergies: Patient has no known allergies.    Current Outpatient Medications   Medication Sig Dispense Refill   • alfuzosin (UROXATRAL) 10 mg 24 hr tablet      • amLODIPine (NORVASC) 10 mg tablet      • apixaban (ELIQUIS) 5 mg tablet Take 1 tablet (5 mg total) by mouth 2 (two) times a day. 180 tablet 1   • furosemide (LASIX) 20 mg tablet as needed.     • glipiZIDE (GLUCOTROL) 5 mg tablet 2 (two) times a day with meals.       • hydrochlorothiazide (MICROZIDE) 12.5 mg capsule      • JANUVIA 100 mg tablet      •  lisinopriL (PRINIVIL) 40 mg tablet 2 (two) times a day.     • metFORMIN (GLUCOPHAGE) 500 mg tablet 1,000 mg 2 (two) times a day with meals.       • metoprolol succinate XL (TOPROL-XL) 100 mg 24 hr tablet 2 (two) times a day.       • ONETOUCH ULTRA TEST strip      • spironolactone (ALDACTONE) 25 mg tablet      • tamsulosin (FLOMAX) 0.4 mg capsule      • vit A/vit C/vit E/zinc/copper (PRESERVISION AREDS ORAL) Take by mouth 2 times daily.     • ZINC ORAL Take 50 mg by mouth.     • aspirin 81 mg enteric coated tablet Take 81 mg by mouth.     • REPATHA SYRINGE 140 mg/mL syringe syringe INJECT 1 SYRINGE EVERY 2 WEEKS       No current facility-administered medications for this visit.       Social History:   Social History     Socioeconomic History   • Marital status:      Spouse name: None   • Number of children: None   • Years of education: None   • Highest education level: None   Tobacco Use   • Smoking status: Never Smoker   • Smokeless tobacco: Never Used   Vaping Use   • Vaping Use: Never used   Substance and Sexual Activity   • Alcohol use: Yes     Comment: rarely   • Drug use: Never   • Sexual activity: Defer       Family History:   Family History   Problem Relation Age of Onset   • Hypertension Biological Mother    • Heart disease Biological Father    • Hypertension Biological Father      Review of Systems   Constitutional: Negative for fatigue.   HENT: Negative for postnasal drip.    Eyes: Negative for visual disturbance.   Respiratory: Negative for cough, chest tightness and shortness of breath.    Cardiovascular: Positive for leg swelling. Negative for chest pain and palpitations.   Gastrointestinal: Negative for nausea.   Genitourinary: Negative for difficulty urinating.        Nocturia x 3   Musculoskeletal: Positive for arthralgias. Negative for back pain and gait problem.   Skin: Negative for wound.   Neurological: Negative for dizziness, syncope and weakness.   Hematological: Does not bruise/bleed  "easily.   Psychiatric/Behavioral: Negative for agitation, behavioral problems and confusion. The patient is not nervous/anxious.    All other systems reviewed and are negative.    Objective   Vital Signs for the last 24 hours:  Visit Vitals  /72 (BP Location: Left upper arm, Patient Position: Sitting)   Pulse 76   Temp (!) 35.8 °C (96.4 °F) (Temporal)   Resp 18   Ht 1.727 m (5' 8\")   Wt 122 kg (270 lb)   SpO2 96%   BMI 41.05 kg/m²       Physical Exam  Vitals reviewed.   Constitutional:       Appearance: Normal appearance. He is well-developed and normal weight.   HENT:      Head: Normocephalic and atraumatic.      Mouth/Throat:      Mouth: Mucous membranes are dry.   Eyes:      Extraocular Movements: Extraocular movements intact.      Pupils: Pupils are equal, round, and reactive to light.   Neck:      Vascular: No carotid bruit or JVD.   Cardiovascular:      Rate and Rhythm: Normal rate and regular rhythm.      Pulses: Normal pulses.      Heart sounds: Murmur heard.    Systolic murmur is present with a grade of 2/6.     Comments: Holosystolic [place] to base  Pulmonary:      Effort: Pulmonary effort is normal.      Breath sounds: No rales.   Abdominal:      General: Bowel sounds are normal.      Palpations: Abdomen is soft.   Musculoskeletal:         General: Normal range of motion.      Cervical back: Normal range of motion and neck supple. Edema present.      Right lower le+ Edema present.      Left lower le+ Edema present.   Skin:     General: Skin is warm and dry.      Capillary Refill: Capillary refill takes 2 to 3 seconds.   Neurological:      General: No focal deficit present.      Mental Status: He is alert and oriented to person, place, and time. Mental status is at baseline.   Psychiatric:         Mood and Affect: Mood normal.         Behavior: Behavior normal.         Thought Content: Thought content normal.         Judgment: Judgment normal.       Cardiac Imaging  TRANSTHORACIC ECHO (TTE) " COMPLETE 01/28/2022  Interpretation Summary  1.  Normal left ventricular function with an estimated ejection fraction of 65 to 70%.  2.  No segmental wall motion abnormalities.  3.  Moderate concentric left ventricular hypertrophy.  4.  Mildly dilated left atrium.  5.  Mild dilatation of the ascending aorta at 3.9 cm.  6.  Severe aortic stenosis with a mean gradient of 41 mmHg and an aortic valve area of 0.8 cm².  7.  Mild mitral regurgitation.  Evidence of mitral annular calcification.  8.  Trace tricuspid insufficiency with normal pulmonary artery pressures.    Cardiac cath completed on 2/25/2022:L  Findings  Nonobstructive coronary artery disease  Mid LAD with 60% tubular stenosis.   Patent LCx and RCA without any significant disease.      CTA completed on 2/8/2022:  SUMMARY:  1. The aortic valve is trileaflet.  There is thickening and calcification of all  three leaflets with reduced excursion during systole.  The aortic valve calcium  score is 3724 consistent with severe aortic stenosis.  There is mild to moderate  calcification in the LVOT just below the left coronary and noncoronary cusps.  2. Normal left ventricular cavity size with mild concentric left ventricular  hypertrophy.  Normal left ventricular systolic function without segmental wall  motion abnormalities.  Ejection fraction of 63%.  3. Normal right ventricular size and function.  4. Mildly dilated left atrium  5. Mild mitral annular calcification.  Focally calcified mitral valve leaflets  with normal opening.  6. Probably mild nonobstructive coronary artery disease.  Assessment of the  right coronary artery was limited by motion artifact. CACS: 1531. See above  report for further details of coronary anatomy.    IMPRESSION:  CTA TAVR assessments and measurements as described above.  Ectatic ascending aorta is noted, measures 4.2 cm in diameter at the level of  right pulmonary artery.  Main pulmonary artery measures 3.6 cm in diameter, may represent  pulmonary  Hypertension.                Calculated Risk Scores          [facility] Adult Cardiac Surgery Data Base Version 4.2               Procedure: Isolated AVR    Risk of Mortality  1.119%   Renal Failure  1.920%   Permanent Stroke  0.431%   Prolonged Ventilation  4.780%   DSW Infection  0.197%   Reoperation  2.490%   Morbidity or Mortality  8.239%   Short Length of Stay  46.389%   Long Length of Stay  3.330%     Comments: preliminary Risk              Calculated Risk Scores          [facility] Adult Cardiac Surgery Data Base Version 4.2               Procedure: AVR + CAB    Risk of Mortality  1.846%   Renal Failure  2.653%   Permanent Stroke  1.210%   Prolonged Ventilation  8.156%   DSW Infection  0.329%   Reoperation  2.904%   Morbidity or Mortality  12.393%   Short Length of Stay  36.217%   Long Length of Stay  5.857%         Assessment/Plan   Aortic stenosis  Echo, cath and CTA personally reviewed by Dr. Irwin and Dr Bolden  Severe calcific aortic stenosis symptomatic with LE edema,  and fatigue.  Aortic valve is degenerative in nature and Tricuspid aortic valve.  KRISHNA 0.8, mean gradient 41mmHg, velocity 4.15m/sec, mild MR, trace TR,  EF 65 %.  Symptoms managed on daily diuretics.  NYHC II-III. No recent hospitalization for CHF.  No chest discomfort. No syncopal or presyncopal episodes.  The patient is tolerating medical therapy.  Nonobstructive CAD.  Patent femoral iliac vessels. Calcium score of the aortic valve 4794.  Plan:  STS risk for a surgical AVR intervention is 1.19%.  Patient is not frail and passes frailty exam.  Patient risk per MD assessment is Low .  Recommend proceeding with a TF TAVR.  Patient is in agreement based on a personal decision to increase quality of life  Patient is requesting that the [place] device be used for embolization protection.     Carotid ultrasound is scheduled for 3/24 and PATs will be completed today.    A Surgery date of:   3/30/22 has been offered and accepted by patient.   The Accurate trial was contemplated. However, his valve area is too big.      Will need to stop Lisinopril 5 days prior to surgery. The mupirocin prescription has been sent to the pharmacy and will begin 5 days in advance of surgery as ordered.     AFIB:  Managed with Eliquis and metoprolol      Plan:  Will stop Eliquis 2 days before surgery and will restart post op in place of Coumadin.         I have discussed with the patient the rationale for procedures as well as possible alternatives.  We discussed potential risks and complications associated with this procedure. The risks include, but are not limited to: bleeding, infection, arrhythmias, myocardial infarction, para-valvular insufficiency,  stroke, death, renal as well as pulmonary insufficiency and the possibility of blood transfusions,  permanent pacemaker placement intra-aortic balloon placement,  assist device placement, cardiac wire perforation, and requirement of CPB.   Time was spent educating the patient about their heart disease diagnosis and treatment options. All questions and concerns were addressed.The patient understands and agrees to proceed.    IMelissa CRNP am scribing for and in the presence of Dr. Irwin

## 2022-03-15 ENCOUNTER — TELEPHONE (OUTPATIENT)
Dept: CARDIOTHORACIC SURGERY | Facility: CLINIC | Age: 72
End: 2022-03-15
Payer: COMMERCIAL

## 2022-03-15 DIAGNOSIS — Z95.2 S/P TAVR (TRANSCATHETER AORTIC VALVE REPLACEMENT): Primary | ICD-10-CM

## 2022-03-15 ASSESSMENT — ENCOUNTER SYMPTOMS
SHORTNESS OF BREATH: 0
AGITATION: 0
PALPITATIONS: 0
CONFUSION: 0
NAUSEA: 0
NERVOUS/ANXIOUS: 0
COUGH: 0
WOUND: 0
DIZZINESS: 0
WEAKNESS: 0
BACK PAIN: 0
DIFFICULTY URINATING: 0
FATIGUE: 0
CHEST TIGHTNESS: 0

## 2022-03-16 ENCOUNTER — TELEPHONE (OUTPATIENT)
Dept: CARDIOTHORACIC SURGERY | Facility: CLINIC | Age: 72
End: 2022-03-16
Payer: COMMERCIAL

## 2022-03-16 NOTE — TELEPHONE ENCOUNTER
Spoke to pt about his Carotid US appt, pt wants to have test done closer to him at his radiologist office. I asked that pt bring his cd of the test to his appt on 3/22.    Pt also wants to get COIVD test on 3/25 closer to him.     I emailed pt NPP and dental clearance form.

## 2022-03-22 ENCOUNTER — TELEPHONE (OUTPATIENT)
Dept: CARDIOTHORACIC SURGERY | Facility: CLINIC | Age: 72
End: 2022-03-22

## 2022-03-22 ENCOUNTER — DOCUMENTATION (OUTPATIENT)
Dept: CARDIOTHORACIC SURGERY | Facility: CLINIC | Age: 72
End: 2022-03-22

## 2022-03-22 ENCOUNTER — APPOINTMENT (OUTPATIENT)
Dept: LAB | Facility: HOSPITAL | Age: 72
End: 2022-03-22
Attending: NURSE PRACTITIONER
Payer: COMMERCIAL

## 2022-03-22 ENCOUNTER — APPOINTMENT (OUTPATIENT)
Dept: PREADMISSION TESTING | Facility: HOSPITAL | Age: 72
End: 2022-03-22
Attending: THORACIC SURGERY (CARDIOTHORACIC VASCULAR SURGERY)
Payer: COMMERCIAL

## 2022-03-22 ENCOUNTER — HOSPITAL ENCOUNTER (OUTPATIENT)
Dept: CARDIOLOGY | Facility: HOSPITAL | Age: 72
Discharge: HOME | End: 2022-03-22
Attending: NURSE PRACTITIONER
Payer: COMMERCIAL

## 2022-03-22 ENCOUNTER — CONSULT (OUTPATIENT)
Dept: CARDIOTHORACIC SURGERY | Facility: CLINIC | Age: 72
End: 2022-03-22
Payer: COMMERCIAL

## 2022-03-22 VITALS
BODY MASS INDEX: 40.92 KG/M2 | HEART RATE: 76 BPM | RESPIRATION RATE: 18 BRPM | DIASTOLIC BLOOD PRESSURE: 72 MMHG | TEMPERATURE: 96.4 F | WEIGHT: 270 LBS | HEIGHT: 68 IN | OXYGEN SATURATION: 96 % | SYSTOLIC BLOOD PRESSURE: 104 MMHG

## 2022-03-22 VITALS
TEMPERATURE: 97.3 F | HEIGHT: 68 IN | RESPIRATION RATE: 17 BRPM | OXYGEN SATURATION: 94 % | DIASTOLIC BLOOD PRESSURE: 62 MMHG | SYSTOLIC BLOOD PRESSURE: 128 MMHG | HEART RATE: 65 BPM | WEIGHT: 269 LBS | BODY MASS INDEX: 40.77 KG/M2

## 2022-03-22 DIAGNOSIS — I35.0 NONRHEUMATIC AORTIC VALVE STENOSIS: ICD-10-CM

## 2022-03-22 DIAGNOSIS — Z01.818 PREOP EXAMINATION: Primary | ICD-10-CM

## 2022-03-22 DIAGNOSIS — I35.0 NONRHEUMATIC AORTIC VALVE STENOSIS: Primary | ICD-10-CM

## 2022-03-22 LAB
ABO + RH BLD: NORMAL
ALBUMIN SERPL-MCNC: 4.3 G/DL (ref 3.4–5)
ALP SERPL-CCNC: 47 IU/L (ref 35–126)
ALT SERPL-CCNC: 27 IU/L (ref 16–63)
ANION GAP SERPL CALC-SCNC: 15 MEQ/L (ref 3–15)
APTT PPP: 30 SEC (ref 23–35)
AST SERPL-CCNC: 22 IU/L (ref 15–41)
BACTERIA URNS QL MICRO: ABNORMAL /HPF
BASOPHILS # BLD: 0.04 K/UL (ref 0.01–0.1)
BASOPHILS NFR BLD: 0.5 %
BILIRUB SERPL-MCNC: 0.9 MG/DL (ref 0.3–1.2)
BILIRUB UR QL STRIP.AUTO: NEGATIVE MG/DL
BLD GP AB SCN SERPL QL: NEGATIVE
BLOOD BANK CMNT PATIENT-IMP: NORMAL
BNP SERPL-MCNC: 157 PG/ML
BUN SERPL-MCNC: 22 MG/DL (ref 8–20)
CALCIUM SERPL-MCNC: 9.9 MG/DL (ref 8.9–10.3)
CHLORIDE SERPL-SCNC: 98 MEQ/L (ref 98–109)
CLARITY UR REFRACT.AUTO: CLEAR
CO2 SERPL-SCNC: 27 MEQ/L (ref 22–32)
COLOR UR AUTO: YELLOW
CREAT SERPL-MCNC: 1.2 MG/DL (ref 0.8–1.3)
D AG BLD QL: POSITIVE
DIFFERENTIAL METHOD BLD: NORMAL
EOSINOPHIL # BLD: 0.23 K/UL (ref 0.04–0.54)
EOSINOPHIL NFR BLD: 2.9 %
ERYTHROCYTE [DISTWIDTH] IN BLOOD BY AUTOMATED COUNT: 13.5 % (ref 11.6–14.4)
ERYTHROCYTE [DISTWIDTH] IN BLOOD BY AUTOMATED COUNT: 13.5 % (ref 11.6–14.4)
EST. AVERAGE GLUCOSE BLD GHB EST-MCNC: 105 MG/DL
GFR SERPL CREATININE-BSD FRML MDRD: 59.9 ML/MIN/1.73M*2
GLUCOSE SERPL-MCNC: 99 MG/DL (ref 70–99)
GLUCOSE UR STRIP.AUTO-MCNC: NEGATIVE MG/DL
HBA1C MFR BLD HPLC: 5.3 %
HCT VFR BLDCO AUTO: 44.2 % (ref 40.1–51)
HCT VFR BLDCO AUTO: 44.2 % (ref 40.1–51)
HGB BLD-MCNC: 15 G/DL (ref 13.7–17.5)
HGB BLD-MCNC: 15 G/DL (ref 13.7–17.5)
HGB UR QL STRIP.AUTO: NEGATIVE
HYALINE CASTS #/AREA URNS LPF: ABNORMAL /LPF
IMM GRANULOCYTES # BLD AUTO: 0.03 K/UL (ref 0–0.08)
IMM GRANULOCYTES NFR BLD AUTO: 0.4 %
INR PPP: 1.2
KETONES UR STRIP.AUTO-MCNC: NEGATIVE MG/DL
LABORATORY COMMENT REPORT: NORMAL
LEUKOCYTE ESTERASE UR QL STRIP.AUTO: NEGATIVE
LYMPHOCYTES # BLD: 1.61 K/UL (ref 1.2–3.5)
LYMPHOCYTES NFR BLD: 20.6 %
MCH RBC QN AUTO: 31.4 PG (ref 28–33.2)
MCH RBC QN AUTO: 31.4 PG (ref 28–33.2)
MCHC RBC AUTO-ENTMCNC: 33.9 G/DL (ref 32.2–36.5)
MCHC RBC AUTO-ENTMCNC: 33.9 G/DL (ref 32.2–36.5)
MCV RBC AUTO: 92.7 FL (ref 83–98)
MCV RBC AUTO: 92.7 FL (ref 83–98)
MONOCYTES # BLD: 0.69 K/UL (ref 0.3–1)
MONOCYTES NFR BLD: 8.8 %
MUCOUS THREADS URNS QL MICRO: ABNORMAL /LPF
NEUTROPHILS # BLD: 5.22 K/UL (ref 1.7–7)
NEUTS SEG NFR BLD: 66.8 %
NITRITE UR QL STRIP.AUTO: NEGATIVE
NRBC BLD-RTO: 0 %
PDW BLD AUTO: 10.2 FL (ref 9.4–12.4)
PDW BLD AUTO: 10.2 FL (ref 9.4–12.4)
PH UR STRIP.AUTO: 5.5 [PH]
PLATELET # BLD AUTO: 216 K/UL (ref 150–350)
PLATELET # BLD AUTO: 216 K/UL (ref 150–350)
POTASSIUM SERPL-SCNC: 4.4 MEQ/L (ref 3.6–5.1)
PREALB SERPL-MCNC: 37 MG/DL (ref 18–38)
PROT SERPL-MCNC: 6.7 G/DL (ref 6–8.2)
PROT UR QL STRIP.AUTO: 1
PROTHROMBIN TIME: 14.6 SEC (ref 12.2–14.5)
QRS DURATION: 88
QT INTERVAL: 436
QTC CALCULATION(BAZETT): 417
R AXIS: 2
RBC # BLD AUTO: 4.77 M/UL (ref 4.5–5.8)
RBC # BLD AUTO: 4.77 M/UL (ref 4.5–5.8)
RBC #/AREA URNS HPF: ABNORMAL /HPF
SODIUM SERPL-SCNC: 140 MEQ/L (ref 136–144)
SP GR UR REFRACT.AUTO: 1.03
SPECIMEN EXP DATE BLD: NORMAL
SQUAMOUS URNS QL MICRO: ABNORMAL /HPF
T WAVE AXIS: -5
UROBILINOGEN UR STRIP-ACNC: 0.2 EU/DL
VENTRICULAR RATE: 55
WBC # BLD AUTO: 7.82 K/UL (ref 3.8–10.5)
WBC # BLD AUTO: 7.82 K/UL (ref 3.8–10.5)
WBC #/AREA URNS HPF: ABNORMAL /HPF

## 2022-03-22 PROCEDURE — 86901 BLOOD TYPING SEROLOGIC RH(D): CPT

## 2022-03-22 PROCEDURE — 86920 COMPATIBILITY TEST SPIN: CPT

## 2022-03-22 PROCEDURE — 87081 CULTURE SCREEN ONLY: CPT

## 2022-03-22 PROCEDURE — 80053 COMPREHEN METABOLIC PANEL: CPT

## 2022-03-22 PROCEDURE — 85730 THROMBOPLASTIN TIME PARTIAL: CPT

## 2022-03-22 PROCEDURE — 3008F BODY MASS INDEX DOCD: CPT | Performed by: THORACIC SURGERY (CARDIOTHORACIC VASCULAR SURGERY)

## 2022-03-22 PROCEDURE — 81001 URINALYSIS AUTO W/SCOPE: CPT

## 2022-03-22 PROCEDURE — 93010 ELECTROCARDIOGRAM REPORT: CPT | Performed by: INTERNAL MEDICINE

## 2022-03-22 PROCEDURE — 81003 URINALYSIS AUTO W/O SCOPE: CPT

## 2022-03-22 PROCEDURE — 84134 ASSAY OF PREALBUMIN: CPT

## 2022-03-22 PROCEDURE — 83880 ASSAY OF NATRIURETIC PEPTIDE: CPT

## 2022-03-22 PROCEDURE — 93005 ELECTROCARDIOGRAM TRACING: CPT

## 2022-03-22 PROCEDURE — 86900 BLOOD TYPING SEROLOGIC ABO: CPT

## 2022-03-22 PROCEDURE — 83036 HEMOGLOBIN GLYCOSYLATED A1C: CPT

## 2022-03-22 PROCEDURE — 85025 COMPLETE CBC W/AUTO DIFF WBC: CPT

## 2022-03-22 PROCEDURE — 85027 COMPLETE CBC AUTOMATED: CPT | Mod: 59

## 2022-03-22 PROCEDURE — 85610 PROTHROMBIN TIME: CPT

## 2022-03-22 PROCEDURE — 36415 COLL VENOUS BLD VENIPUNCTURE: CPT

## 2022-03-22 PROCEDURE — 99204 OFFICE O/P NEW MOD 45 MIN: CPT | Performed by: THORACIC SURGERY (CARDIOTHORACIC VASCULAR SURGERY)

## 2022-03-22 RX ORDER — MUPIROCIN 20 MG/G
OINTMENT TOPICAL
Qty: 22 G | Refills: 0 | Status: SHIPPED | OUTPATIENT
Start: 2022-03-22 | End: 2022-03-31 | Stop reason: HOSPADM

## 2022-03-22 RX ORDER — EVOLOCUMAB 140 MG/ML
140 INJECTION, SOLUTION SUBCUTANEOUS
COMMUNITY
Start: 2022-03-03 | End: 2024-08-13 | Stop reason: SDUPTHER

## 2022-03-22 ASSESSMENT — ENCOUNTER SYMPTOMS
BRUISES/BLEEDS EASILY: 0
ARTHRALGIAS: 1

## 2022-03-22 ASSESSMENT — PAIN SCALES - GENERAL: PAINLEVEL: 0-NO PAIN

## 2022-03-22 NOTE — PRE-PROCEDURE INSTRUCTIONS
1.      You will be called between 3pm -7pm one business day before your procedure  March 29, 2022   to tell you where and when to report.  If you do not receive a phone call by 7pm, please call the Admissions office at [phone] to determine the arrival time for your procedure.      2.        Please report to Admissions or Short Procedure Unit on the day of your procedure.  Detailed directions will be given to you when you are called with arrival time.      3.      Please consult cardiology Dr. Irwin's office regarding fasting instructions     4.    Please consult Dr. Irwin's office regarding medication instructions.   5.      Other Instructions: You may brush your teeth the morning of the procedure. Rinse and spit, do not swallow.  Bring a list of your medications with dosages with you.  Use surgical wash as directed.   6.      If you develop a cold, cough, fever, rash, or other symptom prior to the data of the procedure, please report it to your physician immediately.     7.      If you need to cancel the procedure for any reason, please contact your physician.     8.      Make arrangements to have someone drive you home from the procedure. If you have not arranged for transportation home, your surgery may be cancelled.      9.      You may not take public transportation unless you are accompanied by a responsible person.     10.      You may not drive a car or operate complex or potentially dangerous machinery for 24 hours following anesthesia and/or sedation.      11.      12.      If it is medically necessary for you to have a longer stay, you will be informed as soon as the decision is made.              Only bring essential items to the hospital.  Do not wear or bring anything of value to the hospital including jewelry of any kind, money, or wallet. Do not wear make-up or contact lenses. Do not BRING MEDICATIONS FROM HOME unless instructed to do so.  DO bring your hearing aids, glasses, and a case.         13.      No lotion, creams, powders, or oils on skin the morning of procedure        14.      Dress in comfortable clothes.     15.      If instructed, please bring a copy of your Advanced Directive (Living Will/Durable Power of ) on the day of your procedure.      16.      17.            18.       19.       Patients need to quarantine from the time of PAT COVID test to day of surgery, regardless of COVID vaccine status.         Ensuring your safety at all times is a very important part of out [facility] Culture of Safety . After having surgery and sedation, you are at risk for falling and balance issues.  Although you may feel awake, the effects of the medication can last up to 24 hours after anesthesia.  If you need to use the bathroom during your recovery period, nursing staff will escort you there and stay with you to ensure your safety.          Refrain from drinking alcohol and smoking cigarettes for 24 hours prior to surgery.         Shower with antibacterial soap (DIAL) the night before and morning of your procedure.  If your procedure indicates the need for CHG antiseptic was (Bactoshield or Hibiclens), please use this instead and follow instructions as discussed at the time of your Pre-Admission Testing visit or phone interview.     Above instructions reviewed with patient and patient acknowledges understanding.

## 2022-03-22 NOTE — PROGRESS NOTES
The following baseline frailty exams were completed:    __ pass__  Test  __ pass__ 5 Meter Walk Test  __ fail__ Subjective        1. Albumin Testing  Serum Albumin: - No results found for: ALBUMIN     Height: 173cm    Weight: 122kg  BMI: 41.05  Date Obtained - 03/22/22   Note: Albumin Level < 3.6 g/dL meets cutoff for frailty.    2. Isabel Activities of Daily Living    Activities Points (1 or 0 ) INDEPENDENCE: (1 POINT) NO supervision, direction or personal assistance   DEPENDENCE: (0 POINTS) WITH supervision, direction, personal assistance, or total care   Bathing Points   1 (1 POINT) Bathes self completely or needs help in bathing only a single part of the body such as the back, genital area or disabled extremity.   (0 POINTS) Needs help with bathing more than one part of thebody, getting in or out of the tub or shower.  Requires total bathing   Dressing Points   1 (1 POINT) Gets clothes from closets and drawers and puts on clothes and outer garments complete with fasteners. May have help tying shoes.   (0 POINTS) Needs help with dressing self or needs to be completely dressed.   Toileting Points   1 1 POINT) Goes to toilet, gets on and off, arranges clothes, cleans genital area without help.   (0 POINTS) Needs help transferring to the toilet, cleaning self or uses bedpan or commode.   Transferring Points   1 1 POINT) Moves in and out of bed or chair unassisted. Mechanical transferring aides are acceptable.   (0 POINTS) Needs help in moving from bed to chair or requires a complete transfer.   Continence Points   1 (1 POINT) Exercises complete self control over urination and defecation.   (0 POINTS) Is partially or totally incontinent of bowel or bladder.   Feeding Points   1 1 POINT) Gets food from plate into mouth without help. Preparation of food may be done by another person.   0 POINTS) Needs partial or totalhelp with feeding or requires parenteral feeding.     Total Points   6         Note: if patient  "completes 2/6 or less he or she meets cutoff for frailty.       3.  Strength  Note to the Examiner: Elbow should be at a 90 degree angle, with arm not resting on table or \"pinned\" against chest wall. All trials should be completed with the dynamometer in the dominant hand.    Grasp 1: 42 kg  Grasp 2: 38 kg  Grasp 3: 36 kg  AV.7 kg    Men                                        Cutoff for  strength (Kg) criterion for frailty  BMI   < 24                                  < 29 kg  BMI   24.1 - 26                           < 30 kg  BMI   26.1 - 28                           < 30 kg  BMI   > 28                                  < 32 kg    Women                                   Cutoff for  strength (Kg) criterion for frailty  BMI   <  23                                  < 17 kg  BMI   23.1 - 26                            < 17.3 kg  BMI   26.1 - 29                            < 18 kg  BMI   >  29                                  < 21 kg    (Appendix, Fried et al)  _________________________________________________________________    4. 5 Meter Walk Test    5M Walk 1: ___5.56___  s/5m  5M Walk 2: ___5.79____s/5m  5M Walk 3: ___5.06____s/5m    5M Walk AVG:___5.47__ s/5m    Utilized walking aid?  no    Notes:    Men                                         Cutoff time to walk 15 feet criterion for frailty  Height < 173 cm                      > 7 seconds  Height > 173 cm                      > 6 seconds    Women  Height < 159 cm                       >7 secondsFountain Cardiomyopathy Questionnaire (KCCQ-12)    The following questions refer to your heart failure and how it may affect your life. Please read and complete the following  questions. There are no right or wrong answers. Please kala the answer that best applies to you.      1. Heart failure affects different people in different ways. Some feel shortness of breath while others feel fatigue. Please  indicate how much you are limited by heart failure " (shortness of breath or fatigue) in your ability to do the following  activities over the past 2 weeks.    Activity     Extremely Limited    (1) Quite a bit Limited    (2) Moderately Limited    (3) Slightly Limited    (4) Not At All Limited    (5) Limited for other reasons or do not do the activity   (6)     Score   A. Showering/bathing       5   B. Walking 1 amaury on level ground       4   C. Hurrying or jogging  (as if to catch a bus)       4         2. Over the past 2 weeks, how many times did you have swelling in your feet, ankles or legs when you woke up in the morning?      Every morning    (1) 3 or more times  per week but  not every day  (2) 1-2 times per week     (3) Less than once a week     (4) Never over the past 2 weeks     (5)     Score          5         3. Over the past 2 weeks, on average, how many times has fatigue limited your ability to do what you wanted?    All of  the time    (1) Several times  per day    (2) At least  once a day    (3) 3 or more times  per week but  not every day  (4) 1-2 times  per week    (5) Less than  once a week    (6) Never over the  past 2 weeks    (7)     Score            5         4. Over the past 2 weeks, on average, how many times has shortness of breath limited your ability to do what you wanted?    All of  the time    (1) Several times  per day    (2) At least  once a day    (3) 3 or more times  per week but  not every day  (4) 1-2 times  per week    (5) Less than  once a week    (6) Never over the  past 2 weeks    (7)   Score            7         5. Over the past 2 weeks, on average, how many times have you been forced to sleep sitting up in a chair or with at  least 3 pillows to prop you up because of shortness of breath?      Every morning    (1) 3 or more times  per week but  not every day  (2) 1-2 times per week     (3) Less than once a week     (4) Never over the past 2 weeks     (5)   Score          5         6. Over the past 2 weeks, how much has your  heart failure limited your enjoyment of life?    It has extremely  limited my enjoyment  of life    (1) It has limited my  enjoyment of life  quite a bit      (2) It has moderately  limited my enjoyment  of life    (3) It has slightly  limited my enjoyment  of life    (4) It has not limited  my enjoyment  of life at all      (5)     Score          5         7. If you had to spend the rest of your life with your heart failure the way it is right now, how would you feel about this?    Not at all  satisfied    (1) Mostly  dissatisfied    (2) Somewhat  satisfied    (3) Mostly  satisfied    (4) Completely  satisfied    (5)   Score          4         8. How much does your heart failure affect your lifestyle? Please indicate how your heart failure may have limited your  participation in the following activities over the past 2 weeks.        Activity     Severely Limited    (1) Limited  Quite a bit     (2) Moderately Limited    (3) Slightly Limited    (4) Did not limit at all    (5) Does not apply or did not do for other reasons  (6)   Score   A.  Hobbies, recreational  activities         5   B. Working or doing  household chores         4   C. Visiting family or  friends out of your  home         5               Height > 159 cm                       >6 seconds    Note: If patient exceeds walk-time according to height in above table he/she meets req for frailty.

## 2022-03-24 ENCOUNTER — HOSPITAL ENCOUNTER (OUTPATIENT)
Dept: RADIOLOGY | Facility: HOSPITAL | Age: 72
Discharge: HOME | End: 2022-03-24
Attending: NURSE PRACTITIONER
Payer: COMMERCIAL

## 2022-03-24 DIAGNOSIS — I35.0 NONRHEUMATIC AORTIC VALVE STENOSIS: ICD-10-CM

## 2022-03-24 LAB — MICROORGANISM SPEC CULT: NORMAL

## 2022-03-24 PROCEDURE — 93880 EXTRACRANIAL BILAT STUDY: CPT

## 2022-03-28 ENCOUNTER — DOCUMENTATION (OUTPATIENT)
Dept: CARDIOLOGY | Facility: CLINIC | Age: 72
End: 2022-03-28
Payer: COMMERCIAL

## 2022-03-28 DIAGNOSIS — I10 PRIMARY HYPERTENSION: Primary | ICD-10-CM

## 2022-03-28 RX ORDER — HYDRALAZINE HYDROCHLORIDE 50 MG/1
50 TABLET, FILM COATED ORAL 3 TIMES DAILY
Qty: 90 TABLET | Refills: 1 | Status: SHIPPED | OUTPATIENT
Start: 2022-03-28 | End: 2022-04-04 | Stop reason: HOSPADM

## 2022-03-28 NOTE — PROGRESS NOTES
On March 28, 2022 I spoke with Elbert Hensley.  His lisinopril was discontinued in anticipation of his TAVR coming up on March 30.  His blood pressures have been quite high.  I started him on hydralazine 50 mg 3 times a day while he is off his lisinopril.

## 2022-03-29 ENCOUNTER — ANESTHESIA EVENT (OUTPATIENT)
Dept: OPERATING ROOM | Facility: HOSPITAL | Age: 72
Setting detail: SURGERY ADMIT
DRG: 266 | End: 2022-03-29
Payer: COMMERCIAL

## 2022-03-30 ENCOUNTER — APPOINTMENT (INPATIENT)
Dept: CARDIOLOGY | Facility: HOSPITAL | Age: 72
DRG: 266 | End: 2022-03-30
Attending: THORACIC SURGERY (CARDIOTHORACIC VASCULAR SURGERY)
Payer: COMMERCIAL

## 2022-03-30 ENCOUNTER — APPOINTMENT (INPATIENT)
Dept: RADIOLOGY | Facility: HOSPITAL | Age: 72
DRG: 266 | End: 2022-03-30
Attending: PHYSICIAN ASSISTANT
Payer: COMMERCIAL

## 2022-03-30 ENCOUNTER — APPOINTMENT (INPATIENT)
Dept: RADIOLOGY | Facility: HOSPITAL | Age: 72
DRG: 266 | End: 2022-03-30
Attending: THORACIC SURGERY (CARDIOTHORACIC VASCULAR SURGERY)
Payer: COMMERCIAL

## 2022-03-30 ENCOUNTER — HOSPITAL ENCOUNTER (INPATIENT)
Facility: HOSPITAL | Age: 72
LOS: 1 days | Discharge: HOME | DRG: 266 | End: 2022-03-31
Attending: THORACIC SURGERY (CARDIOTHORACIC VASCULAR SURGERY) | Admitting: THORACIC SURGERY (CARDIOTHORACIC VASCULAR SURGERY)
Payer: COMMERCIAL

## 2022-03-30 ENCOUNTER — ANESTHESIA (OUTPATIENT)
Dept: OPERATING ROOM | Facility: HOSPITAL | Age: 72
Setting detail: SURGERY ADMIT
DRG: 266 | End: 2022-03-30
Payer: COMMERCIAL

## 2022-03-30 DIAGNOSIS — I35.0 SEVERE AORTIC STENOSIS: Primary | ICD-10-CM

## 2022-03-30 DIAGNOSIS — I35.9 AORTIC VALVE DISEASE: ICD-10-CM

## 2022-03-30 DIAGNOSIS — I35.0 NONRHEUMATIC AORTIC VALVE STENOSIS: ICD-10-CM

## 2022-03-30 PROBLEM — I50.33 CHF (CONGESTIVE HEART FAILURE), NYHA CLASS III, ACUTE ON CHRONIC, DIASTOLIC (CMS/HCC): Status: ACTIVE | Noted: 2022-03-30

## 2022-03-30 LAB
ABO + RH BLD: NORMAL
ANION GAP SERPL CALC-SCNC: 13 MEQ/L (ref 3–15)
AORTIC VALVE MEAN VELOCITY: 0.98 M/S
AORTIC VALVE VELOCITY TIME INTEGRAL: 24.9 CM
APTT PPP: 37 SEC (ref 23–35)
AV MEAN GRADIENT: 4 MMHG
AV PEAK GRADIENT: 7 MMHG
AV PEAK VELOCITY-S: 1.3 M/S
AV VALVE AREA: 3.06 CM2
BASE EXCESS BLDA CALC-SCNC: 0.7 MEQ/L
BASE EXCESS BLDA CALC-SCNC: 2.4 MEQ/L
BUN SERPL-MCNC: 26 MG/DL (ref 8–20)
CA-I BLD-SCNC: 1.13 MMOL/L (ref 1.15–1.27)
CA-I BLD-SCNC: 1.17 MMOL/L (ref 1.15–1.27)
CA-I BLD-SCNC: 1.19 MMOL/L (ref 1.15–1.27)
CALCIUM SERPL-MCNC: 9 MG/DL (ref 8.9–10.3)
CHLORIDE BLDA-SCNC: 104 MEQ/L (ref 98–109)
CHLORIDE SERPL-SCNC: 105 MEQ/L (ref 98–109)
CO2 BLDA-SCNC: 26.6 MEQ/L (ref 22–32)
CO2 BLDA-SCNC: 28 MEQ/L (ref 22–32)
CO2 SERPL-SCNC: 22 MEQ/L (ref 22–32)
COHGB MFR BLD: 2.1 %
CREAT SERPL-MCNC: 1 MG/DL (ref 0.8–1.3)
D AG BLD QL: POSITIVE
DOP CALC LVOT STROKE VOLUME: 76.3 ML
ERYTHROCYTE [DISTWIDTH] IN BLOOD BY AUTOMATED COUNT: 13.7 % (ref 11.6–14.4)
FIO2 ON VENT: 95 %
GFR SERPL CREATININE-BSD FRML MDRD: >60 ML/MIN/1.73M*2
GLUCOSE BLD-MCNC: 106 MG/DL (ref 70–99)
GLUCOSE BLD-MCNC: 125 MG/DL (ref 70–99)
GLUCOSE BLD-MCNC: 174 MG/DL (ref 70–99)
GLUCOSE BLDA-MCNC: 123 MG/DL (ref 70–99)
GLUCOSE BLDA-MCNC: 133 MG/DL (ref 70–99)
GLUCOSE SERPL-MCNC: 126 MG/DL (ref 70–99)
HCO3 BLDA-SCNC: 25.5 MEQ/L (ref 21–28)
HCO3 BLDA-SCNC: 27 MEQ/L (ref 21–28)
HCT VFR BLDCO AUTO: 39.7 % (ref 40.1–51)
HGB BLD-MCNC: 13.7 G/DL (ref 13.7–17.5)
HGB BLDA OXIMETRY-MCNC: 13.8 G/DL (ref 14–17.5)
HGB BLDA-MCNC: 14.3 G/DL (ref 14–17.5)
INHALED O2 CONCENTRATION: 0 %
INR PPP: 1.1
LABORATORY COMMENT REPORT: NORMAL
LACTATE BLDA-SCNC: 0.9 MMOL/L (ref 0.4–1.6)
LACTATE BLDA-SCNC: 1.1 MMOL/L (ref 0.4–1.6)
LVOT 2D: 2 CM
LVOT A: 3.14 CM2
LVOT MG: 4 MMHG
LVOT MV: 0.93 M/S
LVOT PEAK VELOCITY: 1.18 M/S
LVOT PG: 6 MMHG
LVOT VTI: 24.3 CM
MAGNESIUM SERPL-MCNC: 1.6 MG/DL (ref 1.8–2.5)
MCH RBC QN AUTO: 31.6 PG (ref 28–33.2)
MCHC RBC AUTO-ENTMCNC: 34.5 G/DL (ref 32.2–36.5)
MCV RBC AUTO: 91.5 FL (ref 83–98)
METHGB BLD-SCNC: 0.8 % (ref 0.4–1.5)
PCO2 BLDA: 38 MM HG (ref 35–48)
PCO2 BLDA: 40 MM HG (ref 35–48)
PDW BLD AUTO: 10.1 FL (ref 9.4–12.4)
PH BLDA: 7.41 [PH] (ref 7.35–7.45)
PH BLDA: 7.45 PH (ref 7.35–7.45)
PHOSPHATE SERPL-MCNC: 3.5 MG/DL (ref 2.4–4.7)
PLATELET # BLD AUTO: 195 K/UL (ref 150–350)
PO2 BLDA: 107 MM HG (ref 83–100)
PO2 BLDA: 87 MM HG (ref 83–100)
POCT ACT-HR: 122 SEC (ref 100–140)
POCT ACT-HR: 206 SEC (ref 100–140)
POCT PATIENT TEMPERATURE: 98.6 °F (ref 97–99)
POCT TEST (BLD GAS): ABNORMAL
POCT TEST: ABNORMAL
POCT TEST: NORMAL
POTASSIUM BLDA-SCNC: 3.6 MEQ/L (ref 3.4–4.5)
POTASSIUM BLDA-SCNC: 3.7 MEQ/L (ref 3.4–4.5)
POTASSIUM SERPL-SCNC: 3.8 MEQ/L (ref 3.6–5.1)
PROTHROMBIN TIME: 13.9 SEC (ref 12.2–14.5)
RBC # BLD AUTO: 4.34 M/UL (ref 4.5–5.8)
SAO2 % BLDA: 96 % (ref 93–98)
SAO2 % BLDA: 97 % (ref 93–98)
SODIUM BLDA-SCNC: 138 MEQ/L (ref 136–145)
SODIUM BLDA-SCNC: 138 MEQ/L (ref 136–145)
SODIUM SERPL-SCNC: 140 MEQ/L (ref 136–144)
WBC # BLD AUTO: 9.5 K/UL (ref 3.8–10.5)

## 2022-03-30 PROCEDURE — 63600000 HC DRUGS/DETAIL CODE: Performed by: ANESTHESIOLOGY

## 2022-03-30 PROCEDURE — 25800000 HC PHARMACY IV SOLUTIONS: Performed by: ANESTHESIOLOGY

## 2022-03-30 PROCEDURE — 25000000 HC PHARMACY GENERAL: Performed by: THORACIC SURGERY (CARDIOTHORACIC VASCULAR SURGERY)

## 2022-03-30 PROCEDURE — 82803 BLOOD GASES ANY COMBINATION: CPT | Performed by: THORACIC SURGERY (CARDIOTHORACIC VASCULAR SURGERY)

## 2022-03-30 PROCEDURE — 93005 ELECTROCARDIOGRAM TRACING: CPT | Performed by: THORACIC SURGERY (CARDIOTHORACIC VASCULAR SURGERY)

## 2022-03-30 PROCEDURE — C1760 CLOSURE DEV, VASC: HCPCS | Performed by: THORACIC SURGERY (CARDIOTHORACIC VASCULAR SURGERY)

## 2022-03-30 PROCEDURE — 83605 ASSAY OF LACTIC ACID: CPT | Performed by: PHYSICIAN ASSISTANT

## 2022-03-30 PROCEDURE — 71000001 HC PACU PHASE 1 INITIAL 30MIN: Performed by: THORACIC SURGERY (CARDIOTHORACIC VASCULAR SURGERY)

## 2022-03-30 PROCEDURE — 85730 THROMBOPLASTIN TIME PARTIAL: CPT | Performed by: PHYSICIAN ASSISTANT

## 2022-03-30 PROCEDURE — 33361 REPLACE AORTIC VALVE PERQ: CPT | Mod: 62,Q0 | Performed by: THORACIC SURGERY (CARDIOTHORACIC VASCULAR SURGERY)

## 2022-03-30 PROCEDURE — B41F1ZZ FLUOROSCOPY OF RIGHT LOWER EXTREMITY ARTERIES USING LOW OSMOLAR CONTRAST: ICD-10-PCS | Performed by: INTERNAL MEDICINE

## 2022-03-30 PROCEDURE — 27200000 HC STERILE SUPPLY: Performed by: THORACIC SURGERY (CARDIOTHORACIC VASCULAR SURGERY)

## 2022-03-30 PROCEDURE — 84100 ASSAY OF PHOSPHORUS: CPT | Performed by: PHYSICIAN ASSISTANT

## 2022-03-30 PROCEDURE — 83735 ASSAY OF MAGNESIUM: CPT | Performed by: PHYSICIAN ASSISTANT

## 2022-03-30 PROCEDURE — 25000000 HC PHARMACY GENERAL: Performed by: ANESTHESIOLOGY

## 2022-03-30 PROCEDURE — 85027 COMPLETE CBC AUTOMATED: CPT | Performed by: PHYSICIAN ASSISTANT

## 2022-03-30 PROCEDURE — 80048 BASIC METABOLIC PNL TOTAL CA: CPT | Performed by: PHYSICIAN ASSISTANT

## 2022-03-30 PROCEDURE — 25800000 HC PHARMACY IV SOLUTIONS: Performed by: PHYSICIAN ASSISTANT

## 2022-03-30 PROCEDURE — 21400000 HC ROOM AND CARE CCU/INTERMEDIATE

## 2022-03-30 PROCEDURE — 37000002 HC ANESTHESIA MAC: Performed by: THORACIC SURGERY (CARDIOTHORACIC VASCULAR SURGERY)

## 2022-03-30 PROCEDURE — 36415 COLL VENOUS BLD VENIPUNCTURE: CPT | Performed by: THORACIC SURGERY (CARDIOTHORACIC VASCULAR SURGERY)

## 2022-03-30 PROCEDURE — 63600000 HC DRUGS/DETAIL CODE: Performed by: PHYSICIAN ASSISTANT

## 2022-03-30 PROCEDURE — 25000000 HC PHARMACY GENERAL: Performed by: PHYSICIAN ASSISTANT

## 2022-03-30 PROCEDURE — C1894 INTRO/SHEATH, NON-LASER: HCPCS | Performed by: THORACIC SURGERY (CARDIOTHORACIC VASCULAR SURGERY)

## 2022-03-30 PROCEDURE — 63700000 HC SELF-ADMINISTRABLE DRUG: Performed by: PHYSICIAN ASSISTANT

## 2022-03-30 PROCEDURE — 63600000 HC DRUGS/DETAIL CODE: Performed by: INTERNAL MEDICINE

## 2022-03-30 PROCEDURE — 71000011 HC PACU PHASE 1 EA ADDL MIN: Performed by: THORACIC SURGERY (CARDIOTHORACIC VASCULAR SURGERY)

## 2022-03-30 PROCEDURE — 36000005 HC OR LEVEL 5 INITIAL 30MIN: Performed by: THORACIC SURGERY (CARDIOTHORACIC VASCULAR SURGERY)

## 2022-03-30 PROCEDURE — 93306 TTE W/DOPPLER COMPLETE: CPT

## 2022-03-30 PROCEDURE — 25800000 HC PHARMACY IV SOLUTIONS: Performed by: STUDENT IN AN ORGANIZED HEALTH CARE EDUCATION/TRAINING PROGRAM

## 2022-03-30 PROCEDURE — 93306 TTE W/DOPPLER COMPLETE: CPT | Mod: 26 | Performed by: INTERNAL MEDICINE

## 2022-03-30 PROCEDURE — 25000000 HC PHARMACY GENERAL: Performed by: STUDENT IN AN ORGANIZED HEALTH CARE EDUCATION/TRAINING PROGRAM

## 2022-03-30 PROCEDURE — C1769 GUIDE WIRE: HCPCS | Performed by: THORACIC SURGERY (CARDIOTHORACIC VASCULAR SURGERY)

## 2022-03-30 PROCEDURE — 85610 PROTHROMBIN TIME: CPT | Performed by: PHYSICIAN ASSISTANT

## 2022-03-30 PROCEDURE — 85347 COAGULATION TIME ACTIVATED: CPT | Performed by: THORACIC SURGERY (CARDIOTHORACIC VASCULAR SURGERY)

## 2022-03-30 PROCEDURE — 71045 X-RAY EXAM CHEST 1 VIEW: CPT

## 2022-03-30 PROCEDURE — 36000015 HC OR LEVEL 5 EA ADDL MIN: Performed by: THORACIC SURGERY (CARDIOTHORACIC VASCULAR SURGERY)

## 2022-03-30 PROCEDURE — 33361 REPLACE AORTIC VALVE PERQ: CPT | Mod: 62,Q0 | Performed by: INTERNAL MEDICINE

## 2022-03-30 PROCEDURE — 63600000 HC DRUGS/DETAIL CODE: Performed by: STUDENT IN AN ORGANIZED HEALTH CARE EDUCATION/TRAINING PROGRAM

## 2022-03-30 PROCEDURE — 63600105 HC IODINE BASED CONTRAST: Mod: JW | Performed by: INTERNAL MEDICINE

## 2022-03-30 PROCEDURE — 02RF38Z REPLACEMENT OF AORTIC VALVE WITH ZOOPLASTIC TISSUE, PERCUTANEOUS APPROACH: ICD-10-PCS | Performed by: INTERNAL MEDICINE

## 2022-03-30 PROCEDURE — 82330 ASSAY OF CALCIUM: CPT | Performed by: PHYSICIAN ASSISTANT

## 2022-03-30 PROCEDURE — 27800000 HC SUPPLY/IMPLANTS: Performed by: THORACIC SURGERY (CARDIOTHORACIC VASCULAR SURGERY)

## 2022-03-30 DEVICE — *T* 18 FR. MANTA VASCULAR CLOSURE DEVICE: Type: IMPLANTABLE DEVICE | Site: GROIN | Status: FUNCTIONAL

## 2022-03-30 DEVICE — MYNX CONTROL VCD 6F 7F
Type: IMPLANTABLE DEVICE | Site: GROIN | Status: FUNCTIONAL
Brand: MYNX CONTROL

## 2022-03-30 DEVICE — IMPLANTABLE DEVICE: Type: IMPLANTABLE DEVICE | Site: HEART | Status: FUNCTIONAL

## 2022-03-30 RX ORDER — TAMSULOSIN HYDROCHLORIDE 0.4 MG/1
0.4 CAPSULE ORAL DAILY
Status: DISCONTINUED | OUTPATIENT
Start: 2022-03-30 | End: 2022-03-31 | Stop reason: HOSPADM

## 2022-03-30 RX ORDER — ASPIRIN 81 MG/1
81 TABLET ORAL DAILY
Status: DISCONTINUED | OUTPATIENT
Start: 2022-03-30 | End: 2022-03-31 | Stop reason: HOSPADM

## 2022-03-30 RX ORDER — FENTANYL CITRATE 50 UG/ML
INJECTION, SOLUTION INTRAMUSCULAR; INTRAVENOUS AS NEEDED
Status: DISCONTINUED | OUTPATIENT
Start: 2022-03-30 | End: 2022-03-30 | Stop reason: SURG

## 2022-03-30 RX ORDER — IODIXANOL 320 MG/ML
INJECTION, SOLUTION INTRAVASCULAR
Status: DISCONTINUED | OUTPATIENT
Start: 2022-03-30 | End: 2022-03-30 | Stop reason: HOSPADM

## 2022-03-30 RX ORDER — DEXTROSE 50 % IN WATER (D50W) INTRAVENOUS SYRINGE
10-30 AS NEEDED
Status: DISCONTINUED | OUTPATIENT
Start: 2022-03-30 | End: 2022-03-30

## 2022-03-30 RX ORDER — SODIUM CHLORIDE 9 MG/ML
INJECTION, SOLUTION INTRAVENOUS CONTINUOUS PRN
Status: DISCONTINUED | OUTPATIENT
Start: 2022-03-30 | End: 2022-03-30 | Stop reason: SURG

## 2022-03-30 RX ORDER — INSULIN ASPART 100 [IU]/ML
3 INJECTION, SOLUTION INTRAVENOUS; SUBCUTANEOUS
Status: DISCONTINUED | OUTPATIENT
Start: 2022-03-30 | End: 2022-03-31 | Stop reason: HOSPADM

## 2022-03-30 RX ORDER — CHLORHEXIDINE GLUCONATE ORAL RINSE 1.2 MG/ML
15 SOLUTION DENTAL ONCE
Status: DISCONTINUED | OUTPATIENT
Start: 2022-03-30 | End: 2022-03-30

## 2022-03-30 RX ORDER — PROTAMINE SULFATE 10 MG/ML
INJECTION, SOLUTION INTRAVENOUS AS NEEDED
Status: DISCONTINUED | OUTPATIENT
Start: 2022-03-30 | End: 2022-03-30 | Stop reason: SURG

## 2022-03-30 RX ORDER — FENTANYL CITRATE 50 UG/ML
50 INJECTION, SOLUTION INTRAMUSCULAR; INTRAVENOUS
Status: DISCONTINUED | OUTPATIENT
Start: 2022-03-30 | End: 2022-03-30 | Stop reason: HOSPADM

## 2022-03-30 RX ORDER — CEFAZOLIN SODIUM 2 G/100ML
INJECTION, SOLUTION INTRAVENOUS AS NEEDED
Status: DISCONTINUED | OUTPATIENT
Start: 2022-03-30 | End: 2022-03-30 | Stop reason: SURG

## 2022-03-30 RX ORDER — METOPROLOL SUCCINATE 50 MG/1
50 TABLET, EXTENDED RELEASE ORAL 2 TIMES DAILY
Status: DISCONTINUED | OUTPATIENT
Start: 2022-03-30 | End: 2022-03-31 | Stop reason: DRUGHIGH

## 2022-03-30 RX ORDER — ACETIC ACID 100 %
LIQUID (ML) MISCELLANEOUS
Status: DISCONTINUED | OUTPATIENT
Start: 2022-03-30 | End: 2022-03-30 | Stop reason: HOSPADM

## 2022-03-30 RX ORDER — CHLORHEXIDINE GLUCONATE ORAL RINSE 1.2 MG/ML
15 SOLUTION DENTAL
Status: DISCONTINUED | OUTPATIENT
Start: 2022-03-30 | End: 2022-03-31

## 2022-03-30 RX ORDER — NOREPINEPHRINE BITARTRATE 0.02 MG/ML
.5-3 INJECTION, SOLUTION INTRAVENOUS
Status: DISCONTINUED | OUTPATIENT
Start: 2022-03-30 | End: 2022-03-31

## 2022-03-30 RX ORDER — PROPOFOL 10 MG/ML
INJECTION, EMULSION INTRAVENOUS CONTINUOUS PRN
Status: DISCONTINUED | OUTPATIENT
Start: 2022-03-30 | End: 2022-03-30 | Stop reason: SURG

## 2022-03-30 RX ORDER — FUROSEMIDE 10 MG/ML
40 INJECTION INTRAMUSCULAR; INTRAVENOUS DAILY
Status: DISCONTINUED | OUTPATIENT
Start: 2022-03-30 | End: 2022-03-31 | Stop reason: HOSPADM

## 2022-03-30 RX ORDER — HEPARIN SODIUM 1000 [USP'U]/ML
INJECTION, SOLUTION INTRAVENOUS; SUBCUTANEOUS AS NEEDED
Status: DISCONTINUED | OUTPATIENT
Start: 2022-03-30 | End: 2022-03-30 | Stop reason: SURG

## 2022-03-30 RX ORDER — FAMOTIDINE 20 MG/1
20 TABLET, FILM COATED ORAL 2 TIMES DAILY
Status: DISCONTINUED | OUTPATIENT
Start: 2022-03-30 | End: 2022-03-31 | Stop reason: HOSPADM

## 2022-03-30 RX ORDER — POTASSIUM CHLORIDE 14.9 MG/ML
20 INJECTION INTRAVENOUS EVERY 8 HOURS PRN
Status: DISCONTINUED | OUTPATIENT
Start: 2022-03-30 | End: 2022-03-31 | Stop reason: HOSPADM

## 2022-03-30 RX ORDER — NOREPINEPHRINE BITARTRATE 0.02 MG/ML
INJECTION, SOLUTION INTRAVENOUS CONTINUOUS PRN
Status: DISCONTINUED | OUTPATIENT
Start: 2022-03-30 | End: 2022-03-30 | Stop reason: SURG

## 2022-03-30 RX ORDER — ONDANSETRON HYDROCHLORIDE 2 MG/ML
4 INJECTION, SOLUTION INTRAVENOUS EVERY 8 HOURS PRN
Status: DISCONTINUED | OUTPATIENT
Start: 2022-03-30 | End: 2022-03-31 | Stop reason: HOSPADM

## 2022-03-30 RX ORDER — SENNOSIDES 8.6 MG/1
1 TABLET ORAL 2 TIMES DAILY
Status: DISCONTINUED | OUTPATIENT
Start: 2022-03-30 | End: 2022-03-31 | Stop reason: HOSPADM

## 2022-03-30 RX ORDER — DEXTROSE 40 %
15-30 GEL (GRAM) ORAL AS NEEDED
Status: DISCONTINUED | OUTPATIENT
Start: 2022-03-30 | End: 2022-03-30 | Stop reason: HOSPADM

## 2022-03-30 RX ORDER — LANOLIN ALCOHOL/MO/W.PET/CERES
400 CREAM (GRAM) TOPICAL 2 TIMES DAILY
Status: DISCONTINUED | OUTPATIENT
Start: 2022-03-30 | End: 2022-03-31 | Stop reason: HOSPADM

## 2022-03-30 RX ORDER — FAMOTIDINE 10 MG/ML
20 INJECTION INTRAVENOUS 2 TIMES DAILY
Status: DISCONTINUED | OUTPATIENT
Start: 2022-03-30 | End: 2022-03-31

## 2022-03-30 RX ORDER — ALUMINUM HYDROXIDE, MAGNESIUM HYDROXIDE, AND SIMETHICONE 1200; 120; 1200 MG/30ML; MG/30ML; MG/30ML
30 SUSPENSION ORAL EVERY 4 HOURS PRN
Status: DISCONTINUED | OUTPATIENT
Start: 2022-03-30 | End: 2022-03-31 | Stop reason: HOSPADM

## 2022-03-30 RX ORDER — BISACODYL 10 MG/1
10 SUPPOSITORY RECTAL AS NEEDED
Status: DISCONTINUED | OUTPATIENT
Start: 2022-04-02 | End: 2022-03-31 | Stop reason: HOSPADM

## 2022-03-30 RX ORDER — DEXTROSE 50 % IN WATER (D50W) INTRAVENOUS SYRINGE
25 AS NEEDED
Status: DISCONTINUED | OUTPATIENT
Start: 2022-03-30 | End: 2022-03-30 | Stop reason: HOSPADM

## 2022-03-30 RX ORDER — POTASSIUM CHLORIDE 750 MG/1
20 TABLET, EXTENDED RELEASE ORAL DAILY
Status: DISCONTINUED | OUTPATIENT
Start: 2022-03-30 | End: 2022-03-31 | Stop reason: HOSPADM

## 2022-03-30 RX ORDER — DOCUSATE SODIUM 100 MG/1
100 CAPSULE, LIQUID FILLED ORAL 2 TIMES DAILY
Status: DISCONTINUED | OUTPATIENT
Start: 2022-03-30 | End: 2022-03-31 | Stop reason: HOSPADM

## 2022-03-30 RX ORDER — IBUPROFEN 200 MG
16-32 TABLET ORAL AS NEEDED
Status: DISCONTINUED | OUTPATIENT
Start: 2022-03-30 | End: 2022-03-30 | Stop reason: HOSPADM

## 2022-03-30 RX ORDER — ACETAMINOPHEN 325 MG/1
975 TABLET ORAL EVERY 6 HOURS
Status: DISCONTINUED | OUTPATIENT
Start: 2022-03-30 | End: 2022-03-31 | Stop reason: HOSPADM

## 2022-03-30 RX ORDER — CEFAZOLIN SODIUM 2 G/100ML
2 INJECTION, SOLUTION INTRAVENOUS
Status: COMPLETED | OUTPATIENT
Start: 2022-03-30 | End: 2022-03-31

## 2022-03-30 RX ORDER — POLYETHYLENE GLYCOL 3350 17 G/17G
17 POWDER, FOR SOLUTION ORAL DAILY
Status: DISCONTINUED | OUTPATIENT
Start: 2022-03-30 | End: 2022-03-31 | Stop reason: HOSPADM

## 2022-03-30 RX ORDER — ONDANSETRON HYDROCHLORIDE 2 MG/ML
4 INJECTION, SOLUTION INTRAVENOUS
Status: DISCONTINUED | OUTPATIENT
Start: 2022-03-30 | End: 2022-03-30 | Stop reason: HOSPADM

## 2022-03-30 RX ORDER — HYDROMORPHONE HYDROCHLORIDE 1 MG/ML
0.5 INJECTION, SOLUTION INTRAMUSCULAR; INTRAVENOUS; SUBCUTANEOUS
Status: DISCONTINUED | OUTPATIENT
Start: 2022-03-30 | End: 2022-03-30 | Stop reason: HOSPADM

## 2022-03-30 RX ORDER — ONDANSETRON 4 MG/1
4 TABLET, ORALLY DISINTEGRATING ORAL EVERY 8 HOURS PRN
Status: DISCONTINUED | OUTPATIENT
Start: 2022-03-30 | End: 2022-03-31 | Stop reason: HOSPADM

## 2022-03-30 RX ADMIN — HEPARIN SODIUM 12000 UNITS: 1000 INJECTION, SOLUTION INTRAVENOUS; SUBCUTANEOUS at 14:03

## 2022-03-30 RX ADMIN — MAGNESIUM OXIDE TAB 400 MG (241.3 MG ELEMENTAL MG) 400 MG: 400 (241.3 MG) TAB at 20:42

## 2022-03-30 RX ADMIN — POTASSIUM CHLORIDE 20 MEQ: 200 INJECTION, SOLUTION INTRAVENOUS at 19:30

## 2022-03-30 RX ADMIN — MAGNESIUM SULFATE HEPTAHYDRATE 2 G: 2 INJECTION, SOLUTION INTRAVENOUS at 18:06

## 2022-03-30 RX ADMIN — FAMOTIDINE 20 MG: 20 TABLET ORAL at 20:42

## 2022-03-30 RX ADMIN — CALCIUM CHLORIDE 1 G: 100 INJECTION, SOLUTION INTRAVENOUS at 17:41

## 2022-03-30 RX ADMIN — TAMSULOSIN HYDROCHLORIDE 0.4 MG: 0.4 CAPSULE ORAL at 20:42

## 2022-03-30 RX ADMIN — PROPOFOL INJECTABLE EMULSION 15 MCG/KG/MIN: 10 INJECTION, EMULSION INTRAVENOUS at 12:50

## 2022-03-30 RX ADMIN — CEFAZOLIN SODIUM 2 G: 2 INJECTION, SOLUTION INTRAVENOUS at 13:32

## 2022-03-30 RX ADMIN — DEXMEDETOMIDINE HYDROCHLORIDE 1 MCG/KG/HR: 100 INJECTION, SOLUTION INTRAVENOUS at 12:49

## 2022-03-30 RX ADMIN — NOREPINEPHRINE BITARTRATE 2 MCG/MIN: at 13:46

## 2022-03-30 RX ADMIN — HEPARIN SODIUM 800 UNITS: 1000 INJECTION, SOLUTION INTRAVENOUS; SUBCUTANEOUS at 14:12

## 2022-03-30 RX ADMIN — FENTANYL CITRATE 25 MCG: 50 INJECTION, SOLUTION INTRAMUSCULAR; INTRAVENOUS at 13:43

## 2022-03-30 RX ADMIN — CEFAZOLIN SODIUM 2 G: 2 INJECTION, SOLUTION INTRAVENOUS at 20:42

## 2022-03-30 RX ADMIN — INSULIN ASPART 3 UNITS: 100 INJECTION, SOLUTION INTRAVENOUS; SUBCUTANEOUS at 20:40

## 2022-03-30 RX ADMIN — ASPIRIN 81 MG: 81 TABLET, COATED ORAL at 20:42

## 2022-03-30 RX ADMIN — PROTAMINE SULFATE 5 MG: 10 INJECTION, SOLUTION INTRAVENOUS at 14:27

## 2022-03-30 RX ADMIN — PROTAMINE SULFATE 95 MG: 10 INJECTION, SOLUTION INTRAVENOUS at 14:28

## 2022-03-30 RX ADMIN — SODIUM CHLORIDE: 9 INJECTION, SOLUTION INTRAVENOUS at 12:44

## 2022-03-30 ASSESSMENT — PATIENT HEALTH QUESTIONNAIRE - PHQ9: SUM OF ALL RESPONSES TO PHQ9 QUESTIONS 1 & 2: 0

## 2022-03-30 ASSESSMENT — ENCOUNTER SYMPTOMS: DYSRHYTHMIAS: 1

## 2022-03-30 NOTE — ANESTHESIA PROCEDURE NOTES
Arterial Line:    Start time: 3/30/2022 12:12 PM    An arterial line was placed in the OR for the following indication(s): continuous blood pressure monitoring and blood sampling needed.    A 20 G (size), 1 and 3/4 inch (length), Angiocath (type) catheter was placed,   Seldinger technique used, into the Right radial artery, secured by Tegaderm.      Procedure performed using ultrasound guidance and surface landmarks.  Image not captured or stored.  Image visualization comments: needle visualized going into vessel    Events:  patient tolerated procedure well with no complications.    The supervising anesthesiologist was   Performed By: anesthesiologist  Attending: Dayna Zheng DO

## 2022-03-30 NOTE — OR SURGEON
Pre-Procedure patient identification:  I am the primary operating surgeon/proceduralist and I have identified the patient on 03/30/22 at 11:42 AM Woodrow Irwin MD  Phone Number: [phone]

## 2022-03-30 NOTE — ANESTHESIA PROCEDURE NOTES
Central Venous Line:    Date/Time: 3/30/2022 3:15 PM            A cardiac line was placed in the OR for the following indication(s): at surgeon's request.  Line Type: Temporary Transvenous Pacer    Sterility preparation included the following: provider hand hygiene performed prior to insertion and all 5 sterile barriers used (gloves, gown, cap, mask, large sterile drape) during insertion.  Timeout performed.    The patient was placed in Trendelenburg position. Right internal jugular vein was prepped.    The site was prepped with Chlorhexidine.  A 8 Fr (size), 10 (length), introducer single lumen was placed.    During the procedure, the following specific steps were taken: target vein identified, needle advanced into vein and blood aspirated and guidewire advanced into vein.  Seldinger technique used      Procedure performed using ultrasound guidance and fluoroscopy.  Sterile gel and probe cover used in ultrasound-guided central venous catheter insertion.  Image captured and stored.             The PAC placement was confirmed by pressure tracing changes and x-ray          Post insertion care included: all ports aspirated, all ports flushed easily, guidewire removed intact, Biopatch applied, line sutured in place and dressing applied.    During the procedure the patient experienced: patient tolerated procedure well with no complications.                    Staffing  Performed: anesthesiologist   Anesthesiologist: Dayna Zheng DO

## 2022-03-30 NOTE — ANESTHESIOLOGIST PRE-PROCEDURE ATTESTATION
Pre-Procedure Patient Identification:  I am the Primary Anesthesiologist and have identified the patient on 03/30/22 at 10:59 AM.   I have confirmed the procedure(s) will be performed by the following surgeon/proceduralist Woodrow Irwin MD.

## 2022-03-30 NOTE — ANESTHESIA POSTPROCEDURE EVALUATION
Patient: Yared Hensley    Procedure Summary     Date: 03/30/22 Room / Location: [facility] OR 15 / [facility] OR    Anesthesia Start: 1244 Anesthesia Stop: 1550    Procedures:       TAVR TRANSFEMORAL AORTIC VALVE REPLACEMENT (N/A )      TAVR percutaneous femoral (N/A ) Diagnosis:       Nonrheumatic aortic valve stenosis      (Nonrheumatic aortic valve stenosis [I35.0])    Surgeons: Woodrow Irwin MD; Palmer Rosenthal MD Responsible Provider: Dayna Zheng DO    Anesthesia Type: MAC ASA Status: 4          Anesthesia Type: MAC  PACU Vitals    No data found in the last 10 encounters.           Anesthesia Post Evaluation    Pain management: adequate  Patient location during evaluation: PACU  Patient participation: complete - patient participated  Level of consciousness: awake and alert  Cardiovascular status: acceptable and hypotensive  Airway Patency: adequate  Respiratory status: acceptable  Hydration status: acceptable  Anesthetic complications: no  Comments: Patient being weaned of vasopressors. Hypotension is likely due to sedative medications and will resolve as meds wear off.

## 2022-03-30 NOTE — BRIEF OP NOTE
TAVR TRANSFEMORAL AORTIC VALVE REPLACEMENT Procedure Note    Procedure:    TAVR TRANSFEMORAL AORTIC VALVE REPLACEMENT  CPT(R) Code:  82946 - [place] REPLACE AORTIC VALVE PERQ FEMORAL ARTRY APPROACH  Noel from right fem  Procedure:    TAVR percutaneous femoral  CPT(R) Code:  69476 - [place] REPLACE AORTIC VALVE PERQ FEMORAL ARTRY APPROACH      Pre-op Diagnosis     * Nonrheumatic aortic valve stenosis [I35.0]       Post-op Diagnosis     * Nonrheumatic aortic valve stenosis [I35.0]    Surgeon(s) and Role:  Panel 1:     * Woodrow Irwin MD - Primary     * Arron Mayorga PA C  Panel 2:     * Palmer Rosenthal MD - Primary     * Kimberly Calabrese MD - Fellow    Anesthesia: General    Staff:   Circulator: Juliette Belle RN  Scrub Person: Chani Vazquez  IR Technologist: Nicole Velasquez, RTR  Documenter: Sean Us RN    Procedure Details   Temp pacer for new left bundle    Estimated Blood Loss: No blood loss documented.    Specimens:                Order Name Source Comment Collection Info Order Time   REPEAT ABO/RH (TYPE CHECK) Blood, Venous  Collected By: Jeni Broderick RN 3/30/2022 11:41 AM     Release to patient   Immediate        REPEAT ABO/RH (TYPE CHECK) Blood, Venous   3/30/2022 12:06 PM     Release to patient   Immediate        PREPARE RBC  Pre-op for Procedure  [facility] standard grouping  3/30/2022 11:11 AM     Transfusion indications   Pre-OP major surgery with bleeding risk          Has consent been obtained?   Yes          Are you aware of this patient having previously identified antibodies?   NO          Does this Patient have Sickle Cell Disease/Sickle Cell Anemia?   NO        PREPARE SINGLE DONOR PLATELETS  PreOp for Procedure  [facility] standard grouping  3/30/2022 11:11 AM     Transfusion indications   Planned Cardiovascular Surgery          Has consent been obtained?   Yes              Drains: * No LDAs found *    Implants:   Implant Name Type Inv. Item Serial No.  Lot No. LRB No. Used  Action   TRANSCATHETER 29MM HV YOUNG 3 W/[employer] - [id number] - [id number] Aortic valve TRANSCATHETER 29MM HV YOUNG 3 W/[employer]YS [id number] [employer]  N/A 1 Implanted   *T* 18 FR. MANTA VASCULAR CLOSURE DEVICE - [id number] Device Vascular Closure *T* 18 FR. MANTA VASCULAR CLOSURE DEVICE  [employer]  Right 1 Implanted   *T* CLOSURE DEVICE MYNX CONTROL 6FR-7FR - [id number] Collagen *T* CLOSURE DEVICE MYNX Control 6FR-7FR  [employer]. [id number] Left 1 Implanted              Complications:  None; patient tolerated the procedure well.           Disposition: PACU - hemodynamically stable.           Condition: stable    Woodrow Irwin MD  Phone Number: [phone]

## 2022-03-30 NOTE — Clinical Note
FR4 catheter advanced over the wire to the ascending aorta for aortic valve crossing, guidewire exchanged for the straight stiff glidewire which is used to cross into the LV, the straight stiff glidewire is removed intact and the TAVR wire is advanced into the LV through the FR4 catheter.

## 2022-03-30 NOTE — ANESTHESIA PROCEDURE NOTES
Arterial Line:    Start time: 3/30/2022 12:58 PM    An arterial line was placed in the OR for the following indication(s): continuous blood pressure monitoring and blood sampling needed.    A 20 G (size), 1 and 3/4 inch (length), Angiocath (type) catheter was placed,   Seldinger technique used, into the Left radial artery, secured by Tegaderm.      Procedure performed using ultrasound guidance and surface landmarks.    Image visualization comments: needle visualized going into vessel    Events:  patient tolerated procedure well with no complications.    The supervising anesthesiologist was   Performed By: anesthesiologist  Attending: Dayna Zheng DO

## 2022-03-30 NOTE — Clinical Note
RIM catheter advanced over a guidewire to the contralateral femoral artery for angiography evaluation of vessel.

## 2022-03-30 NOTE — ANESTHESIA PREPROCEDURE EVALUATION
Relevant Problems   CARDIOVASCULAR   (+) Atrial fibrillation (CMS/HCC)   (+) Essential hypertension   (+) Nonrheumatic aortic valve stenosis      NEUROLOGY   (+) Type 2 diabetes mellitus with diabetic neuropathy, unspecified (CMS/HCC)      URINARY SYSTEM   (+) Benign prostatic hyperplasia with nocturia     Afib (eliquis/BB)    Cath 60% LAD, RCA/LCx non-obstructive     Carotid no e/o HD specific stenosis    Past Medical History:   Diagnosis Date   • Atrial fibrillation (CMS/HCC) 01/28/2022   • Basal cell carcinoma     scalp   • GERD (gastroesophageal reflux disease)    • Hyperlipidemia    • Hypertension    • TIA (transient ischemic attack)    • Type 2 diabetes mellitus (CMS/HCC)        Past Surgical History:   Procedure Laterality Date   • COLONOSCOPY     • SKIN BIOPSY      excision of basal cell ca on scalp       Social History     Socioeconomic History   • Marital status:      Spouse name: Not on file   • Number of children: 3   • Years of education: Not on file   • Highest education level: Not on file   Occupational History   • Occupation: radiation oncologist   Tobacco Use   • Smoking status: Never Smoker   • Smokeless tobacco: Never Used   Vaping Use   • Vaping Use: Never used   Substance and Sexual Activity   • Alcohol use: Yes     Alcohol/week: 2.0 standard drinks     Types: 2 Glasses of wine per week     Comment: rarely   • Drug use: Never   • Sexual activity: Defer   Other Topics Concern   • Not on file   Social History Narrative    Lives at home with wife and 3 daughters, two story home     Social Determinants of Health     Financial Resource Strain: Not on file   Food Insecurity: Not on file   Transportation Needs: Not on file   Physical Activity: Not on file   Stress: Not on file   Social Connections: Not on file   Intimate Partner Violence: Not on file   Housing Stability: Not on file       No Known Allergies    No current facility-administered medications for this encounter.    Current Outpatient  Medications:   •  alfuzosin (UROXATRAL) 10 mg 24 hr tablet, Take 10 mg by mouth daily., Disp: , Rfl:   •  amLODIPine (NORVASC) 10 mg tablet, Take 10 mg by mouth daily., Disp: , Rfl:   •  apixaban (ELIQUIS) 5 mg tablet, Take 1 tablet (5 mg total) by mouth 2 (two) times a day., Disp: 180 tablet, Rfl: 1  •  furosemide (LASIX) 20 mg tablet, Take 20 mg by mouth as needed., Disp: , Rfl:   •  glipiZIDE (GLUCOTROL) 5 mg tablet, 10 mg 2 (two) times a day with meals., Disp: , Rfl:   •  hydrALAZINE (APRESOLINE) 50 mg tablet, Take 1 tablet (50 mg total) by mouth 3 (three) times a day., Disp: 90 tablet, Rfl: 1  •  hydrochlorothiazide (MICROZIDE) 12.5 mg capsule, Take 12.5 mg by mouth daily., Disp: , Rfl:   •  JANUVIA 100 mg tablet, Take 100 mg by mouth daily., Disp: , Rfl:   •  lisinopriL (PRINIVIL) 40 mg tablet, Take 40 mg by mouth 2 (two) times a day., Disp: , Rfl:   •  metFORMIN (GLUCOPHAGE) 500 mg tablet, 1,000 mg 2 (two) times a day with meals.  , Disp: , Rfl:   •  metoprolol succinate XL (TOPROL-XL) 100 mg 24 hr tablet, Take 100 mg by mouth 2 (two) times a day., Disp: , Rfl:   •  mupirocin (BACTROBAN) 2 % ointment, Nasal application, starting 5 days before surgery Twice daily, Disp: 22 g, Rfl: 0  •  ONETOUCH ULTRA TEST strip, , Disp: , Rfl:   •  REPATHA SYRINGE 140 mg/mL syringe syringe, Inject 140 mg under the skin., Disp: , Rfl:   •  spironolactone (ALDACTONE) 25 mg tablet, Take 25 mg by mouth daily., Disp: , Rfl:   •  tamsulosin (FLOMAX) 0.4 mg capsule, Take 0.4 mg by mouth daily., Disp: , Rfl:   •  vit A/vit C/vit E/zinc/copper (PRESERVISION AREDS ORAL), Take by mouth 2 times daily., Disp: , Rfl:   •  ZINC ORAL, Take 50 mg by mouth., Disp: , Rfl:     There were no vitals filed for this visit.    Cardiac Imaging    TRANSTHORACIC ECHO (TTE) COMPLETE 01/28/2022    Interpretation Summary  1.  Normal left ventricular function with an estimated ejection fraction of 65 to 70%.  2.  No segmental wall motion abnormalities.  3.   Moderate concentric left ventricular hypertrophy.  4.  Mildly dilated left atrium.  5.  Mild dilatation of the ascending aorta at 3.9 cm.  6.  Severe aortic stenosis with a mean gradient of 41 mmHg and an aortic valve area of 0.8 cm².  7.  Mild mitral regurgitation.  Evidence of mitral annular calcification.  8.  Trace tricuspid insufficiency with normal pulmonary artery pressures.      CBC Results       03/22/22     1216    WBC 7.82     7.82    RBC 4.77     4.77    HGB 15.0     15.0    HCT 44.2     44.2    MCV 92.7     92.7    MCH 31.4     31.4    MCHC 33.9     33.9         216        BMP Results       03/22/22     1216        K 4.4    Cl 98    CO2 27    Glucose 99    BUN 22    Creatinine 1.2    Calcium 9.9    Anion Gap 15    EGFR 59.9        PT/PTT Results       03/22/22     1216    PT 14.6    INR 1.2    PTT 30         Comment for INR at 1216 on 03/22/22: Moderate Intensity Anticoagulation = 2.0 to 3.0, High Intensity = 2.5 to 3.5        The patient does not have an active anticoagulation episode.    O        Anesthesia ROS/MED HX      Neuro/Psych    TIA  Cardiovascular   Valvular problems/murmurs   dyslipidemia   hypertension  Dysrhythmias   Echocardiogram reviewed and ECG reviewed  GI/Hepatic   GERD  Endo/Other   Diabetes  Body Habitus: Normal           Physical Exam    Airway   Mallampati: II   TM distance: >3 FB   Neck ROM: full  Cardiovascular - normal   Rhythm: regular   Rate: normalPulmonary - normal   clear to auscultation  Dental - normal        Anesthesia Plan    Plan: MAC    Technique: MAC     Lines and Monitors: additional IV and arterial line     Airway: natural airway / supplemental oxygen   ASA 4  Anesthetic plan and risks discussed with: patient  Induction:    intravenous   Postop Plan:   Patient Disposition: phase II then home   Pain Management: IV analgesics

## 2022-03-31 ENCOUNTER — TELEPHONE (OUTPATIENT)
Dept: CARDIOLOGY | Facility: CLINIC | Age: 72
End: 2022-03-31
Payer: COMMERCIAL

## 2022-03-31 ENCOUNTER — APPOINTMENT (INPATIENT)
Dept: RADIOLOGY | Facility: HOSPITAL | Age: 72
DRG: 266 | End: 2022-03-31
Attending: NURSE PRACTITIONER
Payer: COMMERCIAL

## 2022-03-31 ENCOUNTER — APPOINTMENT (INPATIENT)
Dept: CARDIOLOGY | Facility: HOSPITAL | Age: 72
DRG: 266 | End: 2022-03-31
Attending: PHYSICIAN ASSISTANT
Payer: COMMERCIAL

## 2022-03-31 VITALS
BODY MASS INDEX: 40.62 KG/M2 | TEMPERATURE: 98.1 F | OXYGEN SATURATION: 96 % | HEIGHT: 68 IN | DIASTOLIC BLOOD PRESSURE: 58 MMHG | WEIGHT: 268 LBS | RESPIRATION RATE: 16 BRPM | SYSTOLIC BLOOD PRESSURE: 128 MMHG | HEART RATE: 66 BPM

## 2022-03-31 LAB
ANION GAP SERPL CALC-SCNC: 15 MEQ/L (ref 3–15)
AORTIC VALVE MEAN VELOCITY: 1.73 M/S
AORTIC VALVE VELOCITY TIME INTEGRAL: 52 CM
ATRIAL RATE: 66
ATRIAL RATE: 83
ATRIAL RATE: 94
AV MEAN GRADIENT: 16 MMHG
AV PEAK GRADIENT: 29 MMHG
AV PEAK VELOCITY-S: 2.78 M/S
AV VALVE AREA: 2.01 CM2
BSA FOR ECHO PROCEDURE: 2.41 M2
BUN SERPL-MCNC: 18 MG/DL (ref 8–20)
CALCIUM SERPL-MCNC: 9.3 MG/DL (ref 8.9–10.3)
CHLORIDE SERPL-SCNC: 102 MEQ/L (ref 98–109)
CO2 SERPL-SCNC: 22 MEQ/L (ref 22–32)
CREAT SERPL-MCNC: 0.9 MG/DL (ref 0.8–1.3)
DOP CALC LVOT STROKE VOLUME: 120.43 ML
E WAVE DECELERATION TIME: 187 MS
E/E' RATIO: 25.7
E/LAT E' RATIO: 15.8
EDV (BP): 42.5 CM3
EF (A4C): 75.7 %
EF A2C: 73.3 %
EJECTION FRACTION: 74.4 %
ERYTHROCYTE [DISTWIDTH] IN BLOOD BY AUTOMATED COUNT: 13.5 % (ref 11.6–14.4)
ESV (BP): 10.9 CM3
GFR SERPL CREATININE-BSD FRML MDRD: >60 ML/MIN/1.73M*2
GLUCOSE BLD-MCNC: 129 MG/DL (ref 70–99)
GLUCOSE BLD-MCNC: 133 MG/DL (ref 70–99)
GLUCOSE SERPL-MCNC: 146 MG/DL (ref 70–99)
HCT VFR BLDCO AUTO: 41.2 % (ref 40.1–51)
HGB BLD-MCNC: 14.2 G/DL (ref 13.7–17.5)
LA ESV (BP): 113 CM3
LA ESV INDEX (A2C): 51.87 CM3/M2
LA ESV INDEX (BP): 46.89 CM3/M2
LAAS-AP2: 30.8 CM2
LAAS-AP4: 28.4 CM2
LALD A4C: 6.21 CM
LALD A4C: 6.23 CM
LAV-S: 125 CM3
LEFT ATRIUM VOLUME INDEX: 42.32 CM3/M2
LEFT ATRIUM VOLUME: 102 CM3
LEFT VENTRICLE DIASTOLIC VOLUME INDEX: 16.02 CM3/M2
LEFT VENTRICLE DIASTOLIC VOLUME: 38.6 CM3
LEFT VENTRICLE SYSTOLIC VOLUME INDEX: 3.9 CM3/M2
LEFT VENTRICLE SYSTOLIC VOLUME: 9.39 CM3
LV DIASTOLIC VOLUME: 44.5 CM3
LV ESV (APICAL 2 CHAMBER): 11.9 CM3
LVAD-AP2: 20.5 CM2
LVAD-AP4: 19.7 CM2
LVAS-AP2: 9.66 CM2
LVAS-AP4: 8.91 CM2
LVEDVI(A2C): 18.46 CM3/M2
LVEDVI(BP): 17.63 CM3/M2
LVESVI(A2C): 4.94 CM3/M2
LVESVI(BP): 4.52 CM3/M2
LVLD-AP2: 7.75 CM
LVLD-AP4: 8.18 CM
LVLS-AP2: 6.51 CM
LVLS-AP4: 7.03 CM
LVOT 2D: 2.3 CM
LVOT A: 3.6 CM2
LVOT MG: 5 MMHG
LVOT MV: 0.97 M/S
LVOT PEAK VELOCITY: 1.23 M/S
LVOT PG: 6 MMHG
LVOT VTI: 29 CM
MAGNESIUM SERPL-MCNC: 1.6 MG/DL (ref 1.8–2.5)
MCH RBC QN AUTO: 31.7 PG (ref 28–33.2)
MCHC RBC AUTO-ENTMCNC: 34.5 G/DL (ref 32.2–36.5)
MCV RBC AUTO: 92 FL (ref 83–98)
MV E'TISSUE VEL-LAT: 0.1 M/S
MV E'TISSUE VEL-MED: 0.06 M/S
MV PEAK E VEL: 1.52 M/S
MV VALVE AREA P 1/2 METHOD: 4 CM2
PDW BLD AUTO: 10.3 FL (ref 9.4–12.4)
PHOSPHATE SERPL-MCNC: 2.9 MG/DL (ref 2.4–4.7)
PLATELET # BLD AUTO: 161 K/UL (ref 150–350)
POCT TEST: ABNORMAL
POCT TEST: ABNORMAL
POTASSIUM SERPL-SCNC: 3.8 MEQ/L (ref 3.6–5.1)
QRS DURATION: 152
QRS DURATION: 90
QRS DURATION: 94
QT INTERVAL: 386
QT INTERVAL: 390
QT INTERVAL: 478
QTC CALCULATION(BAZETT): 412
QTC CALCULATION(BAZETT): 442
QTC CALCULATION(BAZETT): 501
R AXIS: 1
R AXIS: 11
R AXIS: 50
RBC # BLD AUTO: 4.48 M/UL (ref 4.5–5.8)
SODIUM SERPL-SCNC: 139 MEQ/L (ref 136–144)
T WAVE AXIS: 223
T WAVE AXIS: 261
T WAVE AXIS: 67
VENTRICULAR RATE: 66
VENTRICULAR RATE: 67
VENTRICULAR RATE: 79
WBC # BLD AUTO: 8.04 K/UL (ref 3.8–10.5)

## 2022-03-31 PROCEDURE — 93010 ELECTROCARDIOGRAM REPORT: CPT | Performed by: INTERNAL MEDICINE

## 2022-03-31 PROCEDURE — 99238 HOSP IP/OBS DSCHRG MGMT 30/<: CPT | Performed by: THORACIC SURGERY (CARDIOTHORACIC VASCULAR SURGERY)

## 2022-03-31 PROCEDURE — 93005 ELECTROCARDIOGRAM TRACING: CPT | Performed by: PHYSICIAN ASSISTANT

## 2022-03-31 PROCEDURE — 99232 SBSQ HOSP IP/OBS MODERATE 35: CPT | Performed by: ANESTHESIOLOGY

## 2022-03-31 PROCEDURE — 93306 TTE W/DOPPLER COMPLETE: CPT | Mod: 26 | Performed by: INTERNAL MEDICINE

## 2022-03-31 PROCEDURE — 83735 ASSAY OF MAGNESIUM: CPT | Performed by: PHYSICIAN ASSISTANT

## 2022-03-31 PROCEDURE — 93306 TTE W/DOPPLER COMPLETE: CPT

## 2022-03-31 PROCEDURE — 63700000 HC SELF-ADMINISTRABLE DRUG: Performed by: NURSE PRACTITIONER

## 2022-03-31 PROCEDURE — 84100 ASSAY OF PHOSPHORUS: CPT | Performed by: PHYSICIAN ASSISTANT

## 2022-03-31 PROCEDURE — 36415 COLL VENOUS BLD VENIPUNCTURE: CPT | Performed by: PHYSICIAN ASSISTANT

## 2022-03-31 PROCEDURE — 80048 BASIC METABOLIC PNL TOTAL CA: CPT | Performed by: PHYSICIAN ASSISTANT

## 2022-03-31 PROCEDURE — 85027 COMPLETE CBC AUTOMATED: CPT | Performed by: PHYSICIAN ASSISTANT

## 2022-03-31 PROCEDURE — 63600000 HC DRUGS/DETAIL CODE: Performed by: PHYSICIAN ASSISTANT

## 2022-03-31 PROCEDURE — 93010 ELECTROCARDIOGRAM REPORT: CPT | Mod: 76 | Performed by: INTERNAL MEDICINE

## 2022-03-31 PROCEDURE — 63700000 HC SELF-ADMINISTRABLE DRUG: Performed by: PHYSICIAN ASSISTANT

## 2022-03-31 PROCEDURE — 93005 ELECTROCARDIOGRAM TRACING: CPT | Performed by: THORACIC SURGERY (CARDIOTHORACIC VASCULAR SURGERY)

## 2022-03-31 PROCEDURE — 71045 X-RAY EXAM CHEST 1 VIEW: CPT

## 2022-03-31 RX ORDER — AMLODIPINE BESYLATE 10 MG/1
10 TABLET ORAL DAILY
Status: DISCONTINUED | OUTPATIENT
Start: 2022-03-31 | End: 2022-03-31 | Stop reason: HOSPADM

## 2022-03-31 RX ORDER — HYDRALAZINE HYDROCHLORIDE 50 MG/1
50 TABLET, FILM COATED ORAL 3 TIMES DAILY
Status: DISCONTINUED | OUTPATIENT
Start: 2022-03-31 | End: 2022-03-31 | Stop reason: HOSPADM

## 2022-03-31 RX ORDER — POTASSIUM CHLORIDE 750 MG/1
40 TABLET, EXTENDED RELEASE ORAL ONCE
Status: COMPLETED | OUTPATIENT
Start: 2022-03-31 | End: 2022-03-31

## 2022-03-31 RX ORDER — METOPROLOL SUCCINATE 50 MG/1
100 TABLET, EXTENDED RELEASE ORAL DAILY
Status: DISCONTINUED | OUTPATIENT
Start: 2022-04-01 | End: 2022-03-31

## 2022-03-31 RX ORDER — METOPROLOL SUCCINATE 50 MG/1
50 TABLET, EXTENDED RELEASE ORAL DAILY
Status: DISCONTINUED | OUTPATIENT
Start: 2022-04-01 | End: 2022-03-31

## 2022-03-31 RX ORDER — METOPROLOL SUCCINATE 50 MG/1
100 TABLET, EXTENDED RELEASE ORAL DAILY
Status: DISCONTINUED | OUTPATIENT
Start: 2022-04-01 | End: 2022-03-31 | Stop reason: HOSPADM

## 2022-03-31 RX ORDER — ASPIRIN 81 MG/1
81 TABLET ORAL DAILY
Qty: 30 TABLET | Refills: 3 | Status: SHIPPED | OUTPATIENT
Start: 2022-04-01 | End: 2023-05-12

## 2022-03-31 RX ORDER — METOPROLOL SUCCINATE 50 MG/1
50 TABLET, EXTENDED RELEASE ORAL EVERY 12 HOURS
Status: DISCONTINUED | OUTPATIENT
Start: 2022-03-31 | End: 2022-03-31

## 2022-03-31 RX ORDER — LISINOPRIL 20 MG/1
40 TABLET ORAL
Status: DISCONTINUED | OUTPATIENT
Start: 2022-03-31 | End: 2022-03-31 | Stop reason: HOSPADM

## 2022-03-31 RX ORDER — METOPROLOL SUCCINATE 100 MG/1
100 TABLET, EXTENDED RELEASE ORAL DAILY
Qty: 30 TABLET | Refills: 0 | Status: SHIPPED | OUTPATIENT
Start: 2022-03-31 | End: 2024-07-22

## 2022-03-31 RX ORDER — HYDROCHLOROTHIAZIDE 12.5 MG/1
12.5 CAPSULE ORAL DAILY
Status: DISCONTINUED | OUTPATIENT
Start: 2022-03-31 | End: 2022-03-31 | Stop reason: HOSPADM

## 2022-03-31 RX ORDER — METOPROLOL SUCCINATE 50 MG/1
50 TABLET, EXTENDED RELEASE ORAL ONCE
Status: DISCONTINUED | OUTPATIENT
Start: 2022-03-31 | End: 2022-03-31

## 2022-03-31 RX ORDER — METOPROLOL SUCCINATE 50 MG/1
50 TABLET, EXTENDED RELEASE ORAL DAILY
Status: DISCONTINUED | OUTPATIENT
Start: 2022-03-31 | End: 2022-03-31

## 2022-03-31 RX ORDER — GLIPIZIDE 10 MG/1
10 TABLET ORAL
Status: DISCONTINUED | OUTPATIENT
Start: 2022-03-31 | End: 2022-03-31

## 2022-03-31 RX ORDER — SPIRONOLACTONE 25 MG/1
25 TABLET ORAL DAILY
Status: DISCONTINUED | OUTPATIENT
Start: 2022-03-31 | End: 2022-03-31 | Stop reason: HOSPADM

## 2022-03-31 RX ORDER — HYDRALAZINE HYDROCHLORIDE 50 MG/1
50 TABLET, FILM COATED ORAL ONCE
Status: COMPLETED | OUTPATIENT
Start: 2022-03-31 | End: 2022-03-31

## 2022-03-31 RX ADMIN — MAGNESIUM SULFATE HEPTAHYDRATE 2 G: 2 INJECTION, SOLUTION INTRAVENOUS at 06:52

## 2022-03-31 RX ADMIN — POTASSIUM CHLORIDE 20 MEQ: 750 TABLET, EXTENDED RELEASE ORAL at 09:46

## 2022-03-31 RX ADMIN — FUROSEMIDE 40 MG: 10 INJECTION, SOLUTION INTRAMUSCULAR; INTRAVENOUS at 09:45

## 2022-03-31 RX ADMIN — HYDRALAZINE HYDROCHLORIDE 50 MG: 50 TABLET, FILM COATED ORAL at 09:47

## 2022-03-31 RX ADMIN — APIXABAN 5 MG: 5 TABLET, FILM COATED ORAL at 11:29

## 2022-03-31 RX ADMIN — HYDRALAZINE HYDROCHLORIDE 50 MG: 50 TABLET, FILM COATED ORAL at 05:17

## 2022-03-31 RX ADMIN — HYDROCHLOROTHIAZIDE 12.5 MG: 12.5 CAPSULE, GELATIN COATED ORAL at 09:49

## 2022-03-31 RX ADMIN — ACETAMINOPHEN 975 MG: 325 TABLET ORAL at 05:17

## 2022-03-31 RX ADMIN — POTASSIUM CHLORIDE 40 MEQ: 10 TABLET, EXTENDED RELEASE ORAL at 11:29

## 2022-03-31 RX ADMIN — METOPROLOL SUCCINATE 50 MG: 50 TABLET, EXTENDED RELEASE ORAL at 09:47

## 2022-03-31 RX ADMIN — SPIRONOLACTONE 25 MG: 25 TABLET, FILM COATED ORAL at 09:48

## 2022-03-31 RX ADMIN — INSULIN ASPART 3 UNITS: 100 INJECTION, SOLUTION INTRAVENOUS; SUBCUTANEOUS at 13:49

## 2022-03-31 RX ADMIN — ASPIRIN 81 MG: 81 TABLET, COATED ORAL at 09:44

## 2022-03-31 RX ADMIN — MAGNESIUM OXIDE TAB 400 MG (241.3 MG ELEMENTAL MG) 400 MG: 400 (241.3 MG) TAB at 09:47

## 2022-03-31 RX ADMIN — METOPROLOL SUCCINATE 50 MG: 50 TABLET, EXTENDED RELEASE ORAL at 11:29

## 2022-03-31 RX ADMIN — CEFAZOLIN SODIUM 2 G: 2 INJECTION, SOLUTION INTRAVENOUS at 05:17

## 2022-03-31 RX ADMIN — INSULIN ASPART 3 UNITS: 100 INJECTION, SOLUTION INTRAVENOUS; SUBCUTANEOUS at 09:13

## 2022-03-31 RX ADMIN — FAMOTIDINE 20 MG: 20 TABLET ORAL at 09:45

## 2022-03-31 RX ADMIN — AMLODIPINE BESYLATE 10 MG: 10 TABLET ORAL at 09:44

## 2022-03-31 NOTE — DISCHARGE SUMMARY
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 He is followed chronically by his cardiologist Dr Miller. A recent echocardiogram found advancing aortic stenosis therefore, Dr Miller recommended TAVR consultation with the structural heart team. On March 30, 2022, he underwent successful transfemoral aortic valve replacement with a  29 elza 3 valve. Intraoperative a transvenous pacing catheter was placed for new left bundle branch block. Post operative, he was recovered in the PACU and transferred to the stepdown unit. Repeat EKGs have shown resolution of the new LBBB, home betablocker was reintroduced and well tolerated,  TVP was then removed. On post operative day 1, a repeat echocardiogram confirmed proper placement of the aortic valve without paravalvular leak. His home medication regimen was resumed with the exception of decreasing Metoprolol 100 mg ER to once daily (form BID) due to slower heart rate in the 60s. Today he is hemodynamically and clinically ready for discharge home.   [facility] Discharge instructions were reviewed with the patient. A printed copy was provided as well as a detailed list of their medication regimen. A followup appointment with Dr Irwin was created in one month with an echocardiogram. The patient was advised to schedule an appointment with primary care physician and cardiologist within one month. I reviewed all of the patients questions and all agreed to plan of care for discharge. Upon leaving the hospital, the patient was able ambulate safely free of chest pain, shortness of breath with surgical wounds intact and free of infection.       BRIEF OVERVIEW      Attending Provider: Woodrow Irwin MD Attending phys phone: [phone]  Primary Care Physician at Discharge: Ángel Cristobal,  [phone]    Admission Date: 3/30/2022     Discharge Date: 3/31/2022    Primary Discharge Diagnosis  Nonrheumatic aortic valve stenosis    Secondary Discharge Diagnosis  Active Hospital Problems    Diagnosis Date Noted   • CHF (congestive heart failure), NYHA class III, acute on chronic, diastolic (CMS/HCC) 03/30/2022     Priority: High   • Severe aortic stenosis 03/30/2022   • Nonrheumatic aortic valve stenosis 02/14/2022   • Atrial fibrillation (CMS/HCC) 01/28/2022   • Morbid obesity (CMS/HCC) 12/22/2017   • Type 2 diabetes mellitus with diabetic neuropathy, unspecified (CMS/HCC) 03/16/2017   • Essential hypertension 06/14/2016      Resolved Hospital Problems   No resolved problems to display.       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 fib, non-insulin-dependent diabetes, and obesity with worsening symptoms of shortness of breath and fatigue. Recent echocardiogram revealed severe aortic stenosis with a mean gradient of 41 mmHg and an aortic valve area of 0.8 cm². Dr Irwin and the structural team reviewed all the diagnostics as well as medical history recommended pursuing TAVR.    Exam on Day of Discharge  Patient seen and examined on day of discharge.    General: A&O x 3. NAD  Cardiovascular: Irregularly irregular, rate controlled atrial fibrillation on the monitor, no murmur, no rub.   Respiratory: Clear all lung fields, no wheeze or rhonci.   Abdomen: Soft, non-tender, non-distended.  Normoactive bowel sounds.  Extremities: No lower  extremity edema.  Palpable DP and PT pulses bilaterally.   Skin: No lesions or rashes. Bilateral groin sites soft/no hematoma.     Labs  Lab Results   Component Value Date    WBC 9.50 03/30/2022    HGB 13.7 03/30/2022    HCT 39.7 (L) 03/30/2022     03/30/2022    ALT 27 03/22/2022    AST 22 03/22/2022     03/30/2022    K 3.8 03/30/2022     03/30/2022    CREATININE 1.0 03/30/2022    BUN 26 (H) 03/30/2022    CO2 22 03/30/2022    INR 1.1 03/30/2022    HGBA1C 5.3 03/22/2022          Discharge Orders  Discharge Orders Needing Review     Order Details Provider Order Origin    amLODIPine (NORVASC) 10 mg tablet Take 10 mg by mouth daily. Shannon Mendoza MD Prior to Admission    FISH OIL-omega-3 fatty acids (FISH OIL) 340-1,000 mg capsule Take 1 capsule by mouth 4 (four) times a day. Shannon Mendoza MD Prior to Admission    glipiZIDE (GLUCOTROL) 5 mg tablet 10 mg 2 (two) times a day with meals. Shannon Mendoza MD Prior to Admission    hydrALAZINE (APRESOLINE) 50 mg tablet Take 1 tablet (50 mg total) by mouth 3 (three) times a day. Susanna Miller MD Prior to Admission    hydrochlorothiazide (MICROZIDE) 12.5 mg capsule Take 12.5 mg by mouth daily. Shannon Mendoza MD Prior to Admission    JANUVIA 100 mg tablet Take 100 mg by mouth daily. Shannon Mendoza MD Prior to Admission    metFORMIN (GLUCOPHAGE) 500 mg tablet 1,000 mg 2 (two) times a day with meals.   Shannon Mendoza MD Prior to Admission    metoprolol succinate XL (TOPROL-XL) 100 mg 24 hr tablet Take 100 mg by mouth 2 (two) times a day. Shannon Mendoza MD Prior to Admission    mupirocin (BACTROBAN) 2 % ointment Nasal application, starting 5 days before surgery Twice daily Melissa Patel CRNP Prior to Admission    spironolactone (ALDACTONE) 25 mg tablet Take 25 mg by mouth daily. Shannon Mendoza MD Prior to Admission    tamsulosin (FLOMAX) 0.4 mg capsule Take 0.4 mg by mouth daily. Provider, Historical,  MD Prior to Admission    vit A/vit C/vit E/zinc/copper (PRESERVISION AREDS ORAL) Take by mouth 2 times daily. ProviderShannon MD Prior to Admission    vitamin E 200 unit capsule Take 200 Units by mouth daily. Shannon Mendoza MD Prior to Admission    acetaminophen (TYLENOL) tablet 975 mg 975 mg, oral, Every 6 hours, First dose on Wed 3/30/22 at 1800, Recovery (CV) and FloorMax acetaminophen 4000 mg/day Arron Mayorga PA C Inpatient    alfuzosin (UROXATRAL) 10 mg 24 hr tablet Take 10 mg by mouth daily. ProviderShannon MD Prior to Admission    alum-mag hydroxide-simeth (MAALOX) 200-200-20 mg/5 mL suspension 30 mL 30 mL, oral, Every 4 hours PRN, indigestion, Starting on Wed 3/30/22 at 1601, For 90 days, Recovery (CV) and Floor** Shake well before administration ** Arron Mayorga PA C Inpatient    apixaban (ELIQUIS) 5 mg tablet Take 1 tablet (5 mg total) by mouth 2 (two) times a day. Susanna Miller MD Prior to Admission    apixaban (ELIQUIS) tablet 5 mg 5 mg, oral, 2 times daily, First dose on Wed 3/30/22 at 2000, Recovery (CV) and FloorIndications: prevent thromboembolism in chronic atrial fibrillation Arron Mayorga PA C Inpatient    aspirin enteric coated tablet 81 mg 81 mg, oral, Daily, First dose on Wed 3/30/22 at 1700, Recovery (CV) and FloorHold for platelet count < 50,000 ** Do not crush, chew, or bundy **  Arron Mayorga PA C Inpatient    bisacodyL (DULCOLAX) 10 mg suppository 10 mg 10 mg, rectal, As needed, constipation, x 1 dose on POD# 3 if patient has not moved bowels, Starting on Sat 4/2/22 at 0000, For 1 dose, Recovery (CV) and Floor Arron Mayorga PA C Inpatient    calcium chloride 1 g in sodium chloride 0.9 % 50 mL IVPB 1 g, intravenous, at 100 mL/hr, Administer over 30 Minutes, Every 6 hours PRN, ionized Ca less than 1.15 mmol/L, Starting on Wed 3/30/22 at 1601, Recovery (CV) and Floor** CENTRAL LINE PREFERRED ** AVOID MIDLINE ** Mukesh, WANG Arriola C  Inpatient    ceFAZolin in dextrose (iso-os) (ANCEF) 2 gram/100 mL IVPB premix 2 g, intravenous, at 200 mL/hr, Administer over 30 Minutes, Every 8 hours interval, First dose on Wed 3/30/22 at 2130, For 2 doses, Recovery (CV) and FloorIndications: prevention of perioperative infection Arron Mayorga PA C Inpatient    docusate sodium (COLACE) capsule 100 mg 100 mg, oral, 2 times daily, First dose on Wed 3/30/22 at 2000, Recovery (CV) and FloorHold for loose stools or diarrhea. Arron Mayorga PA C Inpatient    famotidine (PEPCID) injection 20 mg 20 mg, intravenous, 2 times daily, First dose on Wed 3/30/22 at 2000, Recovery (CV) and FloorIf unable to take orally. Dilute vial with 3 mL of NSS to make final concentration of 20 mg/5 mL - Slow IV push over 2 mins.  If giving a dose less than 20 mg, dilute first then draw up the appropriate dose.Indications: prevention of stress ulcer Arron Mayorga PA C Inpatient    famotidine (PEPCID) tablet 20 mg 20 mg, oral, 2 times daily, First dose on Wed 3/30/22 at 2000, Recovery (CV) and FloorIndications: prevention of stress ulcer Arron Mayorga PA C Inpatient    furosemide (LASIX) 20 mg tablet Take 20 mg by mouth as needed. Provider, MD Shannon Prior to Admission    furosemide (LASIX) injection 40 mg 40 mg, intravenous, Daily, First dose on Wed 3/30/22 at 1700, For 2 days, Recovery (CV) and Floor Arron Mayorga PA C Inpatient    insulin aspart U-100 (NovoLOG) pen 3 Units 3 Units, subcutaneous, 3 times daily with meals, First dose on Wed 3/30/22 at 2000Prandial Insulin.  Food tray present when injection is given.  Hold if NPO.  Allow 4 hours between injections of rapid acting insulins. Verify patient name on pen Audrey Mott DO Inpatient    lisinopriL (PRINIVIL) 40 mg tablet Take 40 mg by mouth 2 (two) times a day. Provider, MD Shannon Prior to Admission    magnesium oxide (MAG-OX) tablet 400 mg 400 mg, oral, 2 times daily, First dose on Wed  3/30/22 at 2000, For 90 days, Recovery (CV) and FloorHold if Magnesium level >= 2.8 mg/dL and notify physician Arron Mayorga PA C Inpatient    magnesium sulfate IVPB 2g in 50 mL NSS/[id number] 2 g, intravenous, at 25 mL/hr, Administer over 120 Minutes, As needed, if magnesium level < 2.2 mg/dL, Starting on Wed 3/30/22 at 1601, Recovery (CV) and Floor Arron Mayorga PA C Inpatient    metoprolol succinate XL (TOPROL-XL) 24 hr ER tablet 50 mg 50 mg, oral, 2 times daily, First dose on Wed 3/30/22 at 2000, Recovery (CV) and FloorHold if SBP less than 100 HR less than 60 ** Do not crush, chew, or bundy **  Arron Mayorga PA C Inpatient    ondansetron (ZOFRAN) injection 4 mg 4 mg, intravenous, Every 8 hours PRN, nausea, vomiting, Nausea/Vomiting, Starting on Wed 3/30/22 at 1601, Recovery (CV) and FloorIf unable to take orally. Arron Mayorga PA C Inpatient    ondansetron ODT (ZOFRAN-ODT) disintegrating tablet 4 mg 4 mg, oral, Every 8 hours PRN, nausea, vomiting, Nausea/Vomiting, Starting on Wed 3/30/22 at 1601, Recovery (CV) and Floor Arron Mayorga PA C Inpatient    ONETOUCH ULTRA TEST strip  Provider, MD Shannon Prior to Admission    polyethylene glycol (MIRALAX) 17 gram packet 17 g 17 g, oral, Daily, First dose on Wed 3/30/22 at 1700, Recovery (CV) and FloorHold for loose stools or diarrhea. Arron Mayorga PA C Inpatient    potassium chloride (KLOR-CON M) ER tablet (particles/crystals) 20 mEq 20 mEq, oral, Daily, First dose on Wed 3/30/22 at 1700, For 2 days, Recovery (CV) and FloorHold for K > 4.5 ** This oral dosage form should NOT be crushed or pierced **  For patients unable to swallow whole, may split tablet or dissolve.   To dissolve: Place tablet in 4 oz of water and allow 2 minutes to dissolve.  Stir for 30 seconds and administer immediately.  Rinse cup with 1 oz of water and administer immediately. Arron Mayorga PA C Inpatient    potassium chloride 20 mEq in 100 mL IVPB   (premix) 20 mEq, intravenous, at 50 mL/hr, Administer over 120 Minutes, Every 8 hours PRN, < 4.5 mEq/l target 4.5-5, Starting on Wed 3/30/22 at 1601, For 90 days, Recovery (CV) and FloorICU Only.  Central Line Only.  Target 4.5-5 mEq/L. Draw serum K one hour before.  Repeat up to 3 times, and notify physician if K continues < 4.5 mEq/L. ** CENTRAL LINE PREFERRED ** Arron Mayorga PA C Inpatient    REPATHA SYRINGE 140 mg/mL syringe syringe Inject 140 mg under the skin. Provider, MD Shannon Prior to Admission    senna (SENOKOT) tablet 1 tablet 1 tablet, oral, 2 times daily, First dose on Wed 3/30/22 at 2000, Recovery (CV) and FloorHold for loose stools or diarrhea. Arron Mayorga PA C Inpatient    tamsulosin (FLOMAX) 24 hr ER capsule 0.4 mg 0.4 mg, oral, Daily, First dose on Wed 3/30/22 at 1700, Recovery (CV) and Floor** Do not crush, chew, or bundy **  Arron Mayorga PA C Inpatient    ZINC ORAL Take 50 mg by mouth. Provider, MD Shannon Prior to Admission          Outpatient Follow-Ups            In 1 month [facility]    In 1 month Woodrow Irwin MD [facility]          Discharge Disposition     Code Status at Discharge: Full Code  Physician Order for Life-Sustaining Treatment Document Status      No documents found                CONOR De La Cruz  3/31/2022

## 2022-03-31 NOTE — CONSULTS
"Brief Nutrition Note    Recommendations      Continue 2000cal, cardiac diet   Diet info discussed and attached to chart for home     Clinical Course: Patient is a 71 y.o. male who was admitted on 3/30/2022 with a diagnosis of Nonrheumatic aortic valve stenosis [I35.0]  Severe aortic stenosis [I35.0].     Past Medical History:   Diagnosis Date   • Atrial fibrillation (CMS/HCC) 01/28/2022   • Basal cell carcinoma     scalp   • GERD (gastroesophageal reflux disease)    • Hyperlipidemia    • Hypertension    • TIA (transient ischemic attack)    • Type 2 diabetes mellitus (CMS/HCC)      Past Surgical History:   Procedure Laterality Date   • COLONOSCOPY     • SKIN BIOPSY      excision of basal cell ca on scalp       Reason for Assessment  Reason For Assessment: physician consult  Diagnosis: cardiac disease     [facility] Nutrition Screen Tool  Has patient lost weight without trying?: 0-->No  Has patient been eating poorly due to decreased appetite?: 0-->No  [facility] Nutrition Screen Score: 0     Nutrition/Diet History  Appetite Prior to Admission: [place]-%  Intake (%): 100%    Physical Findings  Overall Physical Appearance: obese  Last Bowel Movement: 03/31/22  Skin: surgical incision (s/p TF TAVR)     [id number] Physical Appearance  Overall Physical Appearance: obese  Last Bowel Movement: 03/31/22  Skin: surgical incision (s/p TF TAVR)     Nutrition Order  Nutrition Order: meets nutritional requirements  Nutrition Order Comments: 2000cal, 2gm na, cardiac diet     Anthropometrics  Height: 172.7 cm (5' 8\")       Current Weight  Weight Method: Standing scale  Weight: 122 kg (268 lb)     Ideal Body Weight (IBW)  Ideal Body Weight (IBW) (kg): 70.89  % Ideal Body Weight: 171.48     Body Mass Index (BMI)  BMI (Calculated): 40.8     [id number] Lab Results  Lab Results Reviewed: reviewed   BMP Results       03/31/22 03/30/22 03/22/22     0447 1603 1216     140 140    K 3.8 3.8 4.4    Cl 102 105 98    CO2 22 22 27    Glucose 146 126 99 "    BUN 18 26 22    Creatinine 0.9 1.0 1.2    Calcium 9.3 9.0 9.9    Anion Gap 15 13 15    EGFR >60.0 >60.0 59.9        Lab Results   Component Value Date    HGBA1C 5.3 03/22/2022             Medications  Pertinent Medications Reviewed: reviewed   • acetaminophen  975 mg oral q6h   • amLODIPine  10 mg oral Daily   • apixaban  5 mg oral BID   • aspirin  81 mg oral Daily   • docusate sodium  100 mg oral BID   • famotidine  20 mg oral BID   • furosemide  40 mg intravenous Daily    And   • potassium chloride  20 mEq oral Daily   • [Provider Managed Hold] hydrALAZINE  50 mg oral TID   • hydrochlorothiazide  12.5 mg oral Daily   • insulin aspart U-100  3 Units subcutaneous TID with meals   • lisinopriL  40 mg oral q12h (6a, 6p)   • magnesium oxide  400 mg oral BID   • [START ON 4/1/2022] metoprolol succinate XL  50 mg oral Daily   • polyethylene glycol  17 g oral Daily   • senna  1 tablet oral BID   • spironolactone  25 mg oral Daily   • tamsulosin  0.4 mg oral Daily     Clinical comments: Consult received, sp TAVR. Pt in bed, toleated breakfast well. Good appetite at home, states recently started Nutrasystem at home to help with portion control. He has lost 10 lbs  Reviewed diet for home. Pt receptive.     Goals: > 50-75% meals   Monitor: Labs, skin, po diet theresa, wts , BG     Recommendations: See above       Date: 03/31/22  Signature: Concha Johnson RD

## 2022-03-31 NOTE — PROGRESS NOTES
Cardiothoracic Intensivist Progress Note       CARDIOTHORACIC   Post-Operative Day # 1    Subjective     History of Present Illness: Yared Hensley is a 71 y.o. male with history of AS, HLD, HTN, DM2, Afib on eliquis and non obstructive CAD.  Pt is a physician    S/p TAVR on 3/30 with Dr. Irwin                                                          Eyes: denies difficulty with vision  Ears, nose, mouth, throat, and face: denies oral discomfort                                        Respiratory: denies shortness of breath                                  Cardiovascular: mild chest discomfort at incision site                                  Gastrointestinal: denies abdominal pain                                    Genitourinary: denies difficulty voiding                                      Hematologic: denies bleeding issues                                Musculoskeletal: denies muscle pain                                      Neurological: generalized weakness                                   Integumentary: denies rash    Medical History:   Past Medical History:   Diagnosis Date   • Atrial fibrillation (CMS/HCC) 01/28/2022   • Basal cell carcinoma     scalp   • GERD (gastroesophageal reflux disease)    • Hyperlipidemia    • Hypertension    • TIA (transient ischemic attack)    • Type 2 diabetes mellitus (CMS/HCC)        Surgical History:   Past Surgical History:   Procedure Laterality Date   • COLONOSCOPY     • SKIN BIOPSY      excision of basal cell ca on scalp       Prior to Admission medications    Medication Sig Start Date End Date Taking? Authorizing Provider   amLODIPine (NORVASC) 10 mg tablet Take 10 mg by mouth daily. 12/21/21  Yes Provider, MD Shannon   FISH OIL-omega-3 fatty acids (FISH OIL) 340-1,000 mg capsule Take 1 capsule by mouth 4 (four) times a day.   Yes ProviderShannon MD   glipiZIDE (GLUCOTROL) 5 mg tablet 10 mg 2 (two) times a day with meals. 12/1/21  Yes Provider  MD Shannon   hydrALAZINE (APRESOLINE) 50 mg tablet Take 1 tablet (50 mg total) by mouth 3 (three) times a day. 3/28/22 9/24/22 Yes Susanna Miller MD   hydrochlorothiazide (MICROZIDE) 12.5 mg capsule Take 12.5 mg by mouth daily. 11/19/21  Yes Shannon Mendoza MD   JANUVIA 100 mg tablet Take 100 mg by mouth daily. 11/8/21  Yes Shannon Mendoza MD   metFORMIN (GLUCOPHAGE) 500 mg tablet 1,000 mg 2 (two) times a day with meals.   1/26/22  Yes Shannon Mendoza MD   metoprolol succinate XL (TOPROL-XL) 100 mg 24 hr tablet Take 100 mg by mouth 2 (two) times a day. 11/8/21  Yes Shannon Mendoza MD   mupirocin (BACTROBAN) 2 % ointment Nasal application, starting 5 days before surgery Twice daily 3/22/22  Yes Melissa Patel CRNP   spironolactone (ALDACTONE) 25 mg tablet Take 25 mg by mouth daily. 11/9/21  Yes Shannon Mendoza MD   tamsulosin (FLOMAX) 0.4 mg capsule Take 0.4 mg by mouth daily. 1/15/22  Yes Shannon Mendoza MD   vit A/vit C/vit E/zinc/copper (PRESERVISION AREDS ORAL) Take by mouth 2 times daily.   Yes Shannon Mendoza MD   vitamin E 200 unit capsule Take 200 Units by mouth daily.   Yes Shannon Mendoza MD   alfuzosin (UROXATRAL) 10 mg 24 hr tablet Take 10 mg by mouth daily. 1/23/22   Shannon Mendoza MD   apixaban (ELIQUIS) 5 mg tablet Take 1 tablet (5 mg total) by mouth 2 (two) times a day. 1/28/22 7/27/22  Susanna Miller MD   furosemide (LASIX) 20 mg tablet Take 20 mg by mouth as needed. 12/1/21   Shannon Mendoza MD   lisinopriL (PRINIVIL) 40 mg tablet Take 40 mg by mouth 2 (two) times a day. 12/13/21   Shannon Mendoza MD   ONETOUCH ULTRA TEST strip  12/29/21   Shannon Mendoza MD   REPATHA SYRINGE 140 mg/mL syringe syringe Inject 140 mg under the skin. 3/3/22   Provider, Historical, MD   ZINC ORAL Take 50 mg by mouth.    Provider, Historical, MD       Allergies: Patient has no known allergies.    Social History:   Social History      Socioeconomic History   • Marital status:      Spouse name: None   • Number of children: 3   • Years of education: None   • Highest education level: None   Occupational History   • Occupation: radiation oncologist   Tobacco Use   • Smoking status: Never Smoker   • Smokeless tobacco: Never Used   Vaping Use   • Vaping Use: Never used   Substance and Sexual Activity   • Alcohol use: Yes     Alcohol/week: 2.0 standard drinks     Types: 2 Glasses of wine per week     Comment: rarely   • Drug use: Never   • Sexual activity: Defer   Social History Narrative    Lives at home with wife and 3 daughters, two story home       Family History:   Family History   Problem Relation Age of Onset   • Hypertension Biological Mother    • Heart disease Biological Father    • Hypertension Biological Father        Objective     Vital signs in last 24 hours:  Temp:  [36.4 °C (97.6 °F)-36.9 °C (98.4 °F)] 36.7 °C (98.1 °F)  Heart Rate:  [60-83] 66  Resp:  [9-24] 16  BP: (116-190)/(57-80) 128/58      Intake/Output Summary (Last 24 hours) at 3/31/2022 1224  Last data filed at 3/31/2022 1122  Gross per 24 hour   Intake 1914.53 ml   Output 1990 ml   Net -75.47 ml     Intake/Output this shift:  I/O this shift:  In: 500 [P.O.:400; IV Piggyback:100]  Out: 615 [Urine:615]    Labs  Lab Results   Component Value Date    WBC 8.04 03/31/2022    HGB 14.2 03/31/2022    HCT 41.2 03/31/2022     03/31/2022    ALT 27 03/22/2022    AST 22 03/22/2022     03/31/2022    K 3.8 03/31/2022     03/31/2022    CREATININE 0.9 03/31/2022    BUN 18 03/31/2022    CO2 22 03/31/2022    MG 1.6 (L) 03/31/2022    PHOS 2.9 03/31/2022    INR 1.1 03/30/2022    HGBA1C 5.3 03/22/2022    PT 13.9 03/30/2022    PTT 37 (H) 03/30/2022       Cardiac Imaging    TRANSTHORACIC ECHO (TTE) COMPLETE 03/31/2022    Interpretation Summary  Very technically difficult study.    1. The left ventricular cavity is normal in size, with mild concentric left ventricular  hypertrophy and normal systolic function. The estimated ejection fraction is 65 %.  Cannot rule out regional wall motion abnormalities.  Diastolic parameters not quantified in the setting of mitral annular calcification.  2. S/P 29mm Noel 3 transcatheter aortic valve replacement with peak velocity of 2.8 m/s, mean gradient of 16 mmHg with estimated orifice of 2.0 cm2.  The dimensionless index is 0.44.  The acceleration time is 63 ms.  No transvalvular aortic regurgitation. No paravalvular aortic regurgitation.  3.  The aortic root has postprocedural changes.  The rest of the aorta is not well seen.  4. The mitral valve leaflets are thickened. There is mitral annular calcification. Trace mitral regurgitation.  5. The left atrium is severely enlarged (49 mL/m2).  6. The right ventricle is not well seen, probably normal in size, with normal function. There is a pacemaker/ICD lead in place.  7. The right atrium is not well seen.  8. The tricuspid valve is not well seen. Trace tricuspid regurgitation with insufficient jet to estimate right ventricular systolic pressure.  9. The pulmonic valve not well seen.  10. The inferior vena cava is normal in size with greater than 50% respiratory variation consistent with normal right-sided filling pressures (estimated right atrial pressure of 3 mmHg).  11. No pericardial effusion.    Compared to previous intraprocedural transthoracic echocardiogram, the peak transvalvular velocity and mean gradient is elevated.  The dimensionless index is within normal range and the acceleration time is within normal limits.  Findings may be suggestive of a high flow state.        Full Code                      Respiratory:     diminished at the bases b/l.               Cardiovascular:     atrial fibrillation.              Gastrointestinal:     non-tender and non-distended.                 Genitourinary:     no-deformities.                    Extremities:     no clubbing, cyanosis or edema.             Musculoskeletal:      No injury or deformity.                   Neurological:     follows commands.       Behavior/Emotional:     appropriate and cooperative.                           Lymph:      No adenopathy noted.                               Skin:      Clean, dry and intact.     Examination of the patient performed at the bedside. Rounded with ICU team and discussed management/plan with surgical staff. Medications, radiological studies and labs reviewed and discussed with attending of record, Dr. Woodrow Irwin.    Cardiothoracic Assessment and Plan:  Neuro- no neuro deficits.  Will ambulate when TVP removed  CV- AS s/p TAVR.  Repeat TTE pending.  HTN- restart home meds, Norvasc, lisinopril, toprol XL, spirinolactone and HCTZ.  Will d/c hydralazine as per pt that was started when he was asked to hold his lisinopril prior to surgery.   Pt will start on ASA and eliquis today  Pt with LBBB post op which has now resolved.  Pt can have his TVP removed per surgeon.    Pul- doing well on RA.  CXR reviewed  GI- cardiac diet.   Renal- lasix IV post op- some congestion on CXR.  Avoid nephrotoxins      Disposition: Doing well overall. severe AS s/p TAVR w a TVP. stable to transfer home with outpatient follow up.             Brandee Yoo MD  3/31/2022

## 2022-03-31 NOTE — DISCHARGE INSTRUCTIONS
You Metoprolol was decreased to 100 mg daily. You may resume your home lisinopril, as previously taken.   You will need to to take aspirin 81 mg and Eliquis 5 mg BID.  You must stop the Hydralazine  Please contact your cardiologist, Dr. Streeter for questions pertaining to your blood pressure medications.     You will need dental prophylaxis prior to any cleaning or procedure in the future.  Recommendations for 2 g of amoxicillin or 600 mg of clindamycin (if penicillin allergic) 1 hour prior to your appointment.  You had a valve repair/replacement surgery.    GUIDELINE FOR ACTIVITY     During your first week at home we strongly suggest that you have assistance from family and friends. You can expect to have good days and bad days, so regulate activities according to what your body tells you. Try to balance rest and activity, and when resting remember to keep your legs elevated.       It is important to continue to weigh yourself everyday, just as you were weighed daily in the hospital. Try to weigh yourself every morning after going to the bathroom, using the same scale. A gradual weight gain of 5 pounds in 3 to 5 days, or any increase in puffiness or swelling in fingers and ankles, should be reported to your doctor immediately.       It is also important to shower daily, in order to keep your incisions clean. You should check your incision daily, and report any increased redness, drainage, or a temperature over 101 to your surgeon immediately. A simple task of taking a shower can be very challenging the first week or two after surgery and you may find that you need some assistance. A chair in the shower can be helpful if standing for a long period is too tiring. A simple plastic lawn chair can be placed in the shower, or a small shower bench can be purchased at a local drug store. A handheld shower head could also be helpful to use while sitting in the shower. You may also find that you just need an extra set of  hands to help reach your back or wash your hair.           If you have an incision on your chest you should avoid lifting anything more than 5 pounds for 6 weeks. People do not usually realize how much this may affect them. A gallon of milk weighs about 8 pounds.   Also please avoid bending over greater than a 90 degree angle for 6 weeks post op.      RESPIRATORY/BREATHING    For the first week at home, continue to use the Incentive Spirometer that you were using in the hospital. Try to use it 4-5 times each day, doing 10 puffs each time. Should any unusual shortness of breath occur at any time, especially when lying flat, notify your doctor immediately.       DIET   For the first week at home, eat what you want! We just want to make sure that you eat! It may take a while for your appetite to come back, so maybe six small meals a day will be easier than three large meals. Also try to limit your fluid intake to 6-7 cups per day. Remember to weigh yourself daily and report any sudden weight gain. Avoid high salt content foods.       BOWELS   If you do not have a bowel movement within 2 days of being at home, try some prune juice or an over-the-counter laxative(Milk of Magnesia or Bisacodyl suppositories are the quick answers, Miralax is another option but take a couple of days to work). If there is no bowel movement in the first week, notify your doctor immediately.       MEDICATIONS    At discharge from the hospital, your nurse went over all the medications you are going to take at home. Remember only to take the medication listed on your discharge instructions. DO NOT try to incorporate previous medications into your daily instructed mediations. A home care nurse will help you set up a schedule for taking your medications usually during the 1st home visit. If you elected not to par take in home care, take your medications according to the schedule given to you by your discharging nurse.   You will be discharged on  Aspirin and Plavix. You will take both of these for 6 months after your procedure. If for some reason you are not on plavix you may be on another blood thinning medication for another reason. This medication may continue longer then 6 months.       PAIN MANAGEMENT   Please try 2 extra strength Tylenol (acetaminophen) every 6- 8 hrs for incisional discomfort. You will also be sent home with prescription strength pain medication. Use these pain pills when you need them, and if the Tylenol is not helping. Suggestions for use include medicating- before activity, at bedtime, and then when needed. By the second week you should be able to use just Extra Strength Tylenol for pain. Remember, no driving while taking prescription pain meds!       FAMILY   For the first week at home you should try to limit your visitors. Entertaining even your closest friends and family may tire you out too much. Remember that it is okay to excuse yourself when you are too tired. During the second to third week you can start to increase your number of visitors as tolerated. By the fourth week, enjoy yourself!           It is also important to mention that sometimes following surgery people become depressed. Family and friends should be aware of signs of depression. It is important to watch for changes in appetite, changes in sleep patterns, not wanting to see friends/family, not wanting to participate in daily activities. Any sign of depression should be reported to the doctor.           FOLLOW UP APPOINTMENTS  You will be given an appointment to return to your surgeon’s office 1 month after surgery. This appointment will follow an Echocardiogram appointment at [facility] as well. So go get your echo on the street level [facility] then come on up to the [facility] Level [facility]” for your doctor’s appointment.   You also should be calling your cardiologist for an appointment in 1-2 weeks after your discharge date. This is the doctor who will  continue to manage your heart condition after your surgeon sees you in 1 month’s time.              WHEN TO CALL THE DOCTOR   As previously mentioned, call the doctor for:   ~ Temperature over 101   ~ Increased redness or drainage from incisions   ~ Unusual shortness of breath   ~ No bowel movement in the first week   ~ Gradual weight gain of 5 pounds in 3-5 days   ~ Increased puffiness or swelling in fingers or ankles   ~ Pain not relieved by pain pills       FOR ANY QUESTIONS OR CONCERNS,    CALL SURGEON'S OFFICE: [phone]- Ask for Brigid Chavez, Nurse Practitioner

## 2022-03-31 NOTE — TELEPHONE ENCOUNTER
----- Message from Susanna Miller MD sent at 3/31/2022 12:32 PM EDT -----  Regarding: Appointment  He needs an appointment in approximately 2 weeks.  He will need an EKG

## 2022-03-31 NOTE — CONSULTS
Audrey Mott D.O.  Endocrinology/Diabetes/Lipid Disorders Endocrinology Initial Consultation       CHIEF COMPLAINT   I have been asked to see this patient in consultation by Dr. Woodrow Irwin for diabetes in the presence of aortic stenosis, hypertension atrial fibrillation and hyperlipidemia.     HISTORY OF PRESENT ILLNESS      Yared Hensley is a 71 y.o. male with a past medical history of non-insulin-requiring type 2 diabetes, hypertension, hyperlipidemia, aortic stenosis, morbid obesity who presented for elective aortic valve surgery.  This is a patient who has been known to have aortic stenosis and he had recent complaints of worsening fatigue.  His symptoms of worsening fatigue have been ongoing for about 1 month.  He had associated symptoms of lower extremity edema and rated his symptoms as moderate.  He denies chest pain dizziness lightheadedness or syncope.  Cardiac testing revealed that he had an aortic valve area of 0.8 cm², mean gradient of 41 mmHg, aortic valve calcium score of 3724 with severe aortic stenosis, mild mitral regurgitation and trace tricuspid insufficiency.  Cardiac catheterization performed on February 25, 2022 showed nonobstructive coronary disease and mid LAD occlusion of approximately 60%.  He was classified as NYHC 2/3.    The patient underwent a transfemoral TAVR on March 30.  He is in the stepdown unit of the CT ICU.    He has had type 2 diabetes for several years treated by his family physician with good control on glipizide 5 mg twice a day Januvia 100 mg once a day metformin at 1000 mg twice a day.  His hemoglobin A1c for this surgery was 5.3%.  For hyperlipidemia he takes Repatha.  Patient also underwent a EMG which showed neuropathy although he has no symptoms of neuropathy but he is being controlled with a supplement of B vitamins which he purchases from one of his physicians.    PAST MEDICAL AND SURGICAL HISTORY      Past Medical History:   Diagnosis Date   •  Atrial fibrillation (CMS/HCC) 01/28/2022   • Basal cell carcinoma     scalp   • GERD (gastroesophageal reflux disease)    • Hyperlipidemia    • Hypertension    • TIA (transient ischemic attack)    • Type 2 diabetes mellitus (CMS/HCC)        Past Surgical History:   Procedure Laterality Date   • COLONOSCOPY     • SKIN BIOPSY      excision of basal cell ca on scalp       MEDICATIONS      Medications Prior to Admission   Medication Sig Dispense Refill Last Dose   • amLODIPine (NORVASC) 10 mg tablet Take 10 mg by mouth daily.   3/29/2022   • FISH OIL-omega-3 fatty acids (FISH OIL) 340-1,000 mg capsule Take 1 capsule by mouth 4 (four) times a day.   3/29/2022 at 1800   • glipiZIDE (GLUCOTROL) 5 mg tablet 10 mg 2 (two) times a day with meals.   3/30/2022 at 0700   • hydrALAZINE (APRESOLINE) 50 mg tablet Take 1 tablet (50 mg total) by mouth 3 (three) times a day. 90 tablet 1 3/30/2022 at 0700   • hydrochlorothiazide (MICROZIDE) 12.5 mg capsule Take 12.5 mg by mouth daily.   3/29/2022 at 0900   • JANUVIA 100 mg tablet Take 100 mg by mouth daily.   3/25/2022 at 0900   • metFORMIN (GLUCOPHAGE) 500 mg tablet 1,000 mg 2 (two) times a day with meals.     3/29/2022 at 1800   • metoprolol succinate XL (TOPROL-XL) 100 mg 24 hr tablet Take 100 mg by mouth 2 (two) times a day.   3/30/2022 at 0700   • mupirocin (BACTROBAN) 2 % ointment Nasal application, starting 5 days before surgery Twice daily 22 g 0 3/30/2022 at 0900   • spironolactone (ALDACTONE) 25 mg tablet Take 25 mg by mouth daily.   3/29/2022 at 0900   • tamsulosin (FLOMAX) 0.4 mg capsule Take 0.4 mg by mouth daily.   3/29/2022 at 2100   • vit A/vit C/vit E/zinc/copper (PRESERVISION AREDS ORAL) Take by mouth 2 times daily.   3/30/2022 at 0700   • vitamin E 200 unit capsule Take 200 Units by mouth daily.   3/29/2022 at 0900   • alfuzosin (UROXATRAL) 10 mg 24 hr tablet Take 10 mg by mouth daily.      • apixaban (ELIQUIS) 5 mg tablet Take 1 tablet (5 mg total) by mouth 2 (two)  times a day. 180 tablet 1 3/28/2022 at 0900   • furosemide (LASIX) 20 mg tablet Take 20 mg by mouth as needed.   More than a month at Unknown time   • lisinopriL (PRINIVIL) 40 mg tablet Take 40 mg by mouth 2 (two) times a day.      • ONETOUCH ULTRA TEST strip       • REPATHA SYRINGE 140 mg/mL syringe syringe Inject 140 mg under the skin.      • ZINC ORAL Take 50 mg by mouth.   3/28/2022 at 0900       Current Facility-Administered Medications   Medication Dose Route Frequency Provider Last Rate Last Admin   • acetaminophen (TYLENOL) tablet 975 mg  975 mg oral q6h MukeshArron fisher PA C   975 mg at 03/31/22 0517   • alum-mag hydroxide-simeth (MAALOX) 200-200-20 mg/5 mL suspension 30 mL  30 mL oral q4h PRN Arron Mayorga PA C       • amLODIPine (NORVASC) tablet 10 mg  10 mg oral Daily Betsey Ellis CRNP       • [Provider Managed Hold] apixaban (ELIQUIS) tablet 5 mg  5 mg oral BID Arron Mayorga PA C       • aspirin enteric coated tablet 81 mg  81 mg oral Daily Arron Mayorga PA C   81 mg at 03/30/22 2042   • [START ON 4/2/2022] bisacodyL (DULCOLAX) 10 mg suppository 10 mg  10 mg rectal PRN Arron Mayorga PA C       • calcium chloride 1 g in sodium chloride 0.9 % 50 mL IVPB  1 g intravenous q6h PRN Arron Mayorga PA C   Stopped at 03/30/22 1840   • docusate sodium (COLACE) capsule 100 mg  100 mg oral BID Arron Mayorga PA C       • famotidine (PEPCID) injection 20 mg  20 mg intravenous BID Arron Mayorga PA C        Or   • famotidine (PEPCID) tablet 20 mg  20 mg oral BID Arron Mayorga PA C   20 mg at 03/30/22 2042   • furosemide (LASIX) injection 40 mg  40 mg intravenous Daily Arron Mayorga PA C        And   • potassium chloride (KLOR-CON M) ER tablet (particles/crystals) 20 mEq  20 mEq oral Daily Arron Mayorga PA C       • glipiZIDE (GLUCOTROL) tablet 10 mg  10 mg oral BID AC Betsey Ellis CRNP       • hydrALAZINE (APRESOLINE) tablet 50 mg  50 mg oral  TID Betsey Ellis CRNP       • hydrochlorothiazide (MICROZIDE) capsule 12.5 mg  12.5 mg oral Daily Betsey Ellis CRNP       • insulin aspart U-100 (NovoLOG) pen 3 Units  3 Units subcutaneous TID with meals CarsonAudrey fisherDO   3 Units at 03/30/22 2040   • magnesium oxide (MAG-OX) tablet 400 mg  400 mg oral BID Arron Mayorga PA C   400 mg at 03/30/22 2042   • magnesium sulfate IVPB 2g in 50 mL NSS/D5W/SWFI  2 g intravenous PRN Arron Mayorga PA C 25 mL/hr at 03/31/22 0652 2 g at 03/31/22 0652   • [Provider Managed Hold] metoprolol succinate XL (TOPROL-XL) 24 hr ER tablet 50 mg  50 mg oral BID Arron Mayorga PA C       • ondansetron ODT (ZOFRAN-ODT) disintegrating tablet 4 mg  4 mg oral q8h PRN Arron Mayorga PA C        Or   • ondansetron (ZOFRAN) injection 4 mg  4 mg intravenous q8h PRN Arron Mayorga PA C       • polyethylene glycol (MIRALAX) 17 gram packet 17 g  17 g oral Daily Arron Mayorga PA C       • potassium chloride 20 mEq in 100 mL IVPB  (premix)  20 mEq intravenous q8h PRN Arron Mayorga PA C   Stopped at 03/30/22 2132   • senna (SENOKOT) tablet 1 tablet  1 tablet oral BID Arron Mayorga PA C       • spironolactone (ALDACTONE) tablet 25 mg  25 mg oral Daily Betsey Ellis CRNP       • tamsulosin (FLOMAX) 24 hr ER capsule 0.4 mg  0.4 mg oral Daily Arron Mayorga PA C   0.4 mg at 03/30/22 2042       ALLERGIES      Patient has no known allergies.    FAMILY HISTORY      Family History   Problem Relation Age of Onset   • Hypertension Biological Mother    • Heart disease Biological Father    • Hypertension Biological Father        SOCIAL HISTORY      Social History     Socioeconomic History   • Marital status:      Spouse name: None   • Number of children: 3   • Years of education: None   • Highest education level: None   Occupational History   • Occupation: radiation oncologist   Tobacco Use   • Smoking status: Never Smoker   •  Smokeless tobacco: Never Used   Vaping Use   • Vaping Use: Never used   Substance and Sexual Activity   • Alcohol use: Yes     Alcohol/week: 2.0 standard drinks     Types: 2 Glasses of wine per week     Comment: rarely   • Drug use: Never   • Sexual activity: Defer   Social History Narrative    Lives at home with wife and 3 daughters, two story home       REVIEW OF SYSTEMS      All other systems reviewed and negative except as noted in HPI    PHYSICAL EXAMINATION      Temp:  [36.4 °C (97.6 °F)-36.9 °C (98.5 °F)] 36.7 °C (98.1 °F)  Heart Rate:  [60-82] 80  Resp:  [9-24] 17  BP: (116-190)/(57-80) 164/75  Body mass index is 40.75 kg/m².      Physical Exam:  General: non toxic, alert  Head: NC/AT  Eyes: EOMI, Sclera anicteric  Mouth: dry mucous membranes  Neck: supple, no palpable thyromegaly, no palpable thyroid nodules or adenopathy,no carotid bruits  Cardiovascular: regular + S1/S2  Lungs: clear to auscultation  Abdomen: soft, NT/ND, + bowel sounds.  Obese.  Extremities: no clubbing, cyanosis.  +1 pitting edema from the knees distally.  Positive weakly palpable DP pulses  Neurology: no focal deficits, moves all 4 extremities  Psych: cooperative with exam, pleasant,non confrontational  Skin: no rashes    LABS / IMAGING / EKG        Labs  Lab Results   Component Value Date    HGBA1C 5.3 03/22/2022     Lab Results   Component Value Date    WBC 8.04 03/31/2022    HGB 14.2 03/31/2022    HCT 41.2 03/31/2022     03/31/2022    ALT 27 03/22/2022    AST 22 03/22/2022     03/31/2022    K 3.8 03/31/2022     03/31/2022    CREATININE 0.9 03/31/2022    BUN 18 03/31/2022    CO2 22 03/31/2022       No results found for: TSH, Y0NYNUF, E1QKIVE, THYROIDAB  No results found for: CHOL  No results found for: HDL  No results found for: LDLCALC  No results found for: TRIG  No results found for: CHOLHDL    All point-of-care blood sugars reviewed by me.                Type 2 Diabetes-while he is here, we will treat him with  basal bolus insulin.  Currently I just have him on bolus insulin before meals.  He got out of the operating room late last night.  Upon discharge, he will go home on the above-stated medications he was taking as an outpatient.   Hyperlipidemia-continue Repatha.  No lipid profile for my review although it is assumed that the patient has had a recent lipid profile reviewed by his physician.   Hypertension-hypertension currently controlled on medical therapy.   Coronary Artery Disease-nonobstructive CAD on most recent cardiac cath dated February 2022.   Aortic Stenosis-symptomatic aortic stenosis requiring transfemoral TAVR which was successfully performed yesterday.  Patient doing well this morning.  Discharge as per primary service  Obesity with a BMI of 41-patient should adhere to dietary recommendations which avoid concentrated sweets, limit simple carbohydrates and decrease portions in general.    Morbid obesity-diagnosis code E 66.01       Audrey Mott DO  3/31/2022  8:05 AM

## 2022-03-31 NOTE — PLAN OF CARE
Problem: Adult Inpatient Plan of Care  Goal: Patient-Specific Goal (Individualized)  Outcome: Progressing  Flowsheets (Taken 3/31/2022 0637)  Patient-Specific Goals (Include Timeframe): pt will understand plan of care for today in the morning  Individualized Care Needs: pt wishes to be included in plan of care  Anxieties, Fears or Concerns: pt hopes to have TVP removed today     Problem: Fall Injury Risk  Goal: Absence of Fall and Fall-Related Injury  Outcome: Progressing     Problem: Activity Intolerance (Cardiovascular Surgery)  Goal: Improved Activity Tolerance  Outcome: Progressing   Plan of Care Review  Plan of Care Reviewed With: patient, spouse  Progress: improving  Outcome Summary: Pt arrived from PACU, post TAVR procedure. VSS, AAOx4, bilateral groin sites intact. Pt with IJ TVP for new BBB, rate 40, mA 12, required no pacing overnight.

## 2022-03-31 NOTE — TELEPHONE ENCOUNTER
LVM for patient to contact the office to be scheduled for OV in about 2 weeks      AC - OK to schedule

## 2022-04-03 LAB
CROSSMATCH: NORMAL
ISBT CODE: 5100
PRODUCT CODE: NORMAL
PRODUCT STATUS: NORMAL
SPECIMEN EXP DATE BLD: NORMAL
UNIT ABO: NORMAL
UNIT ID: NORMAL
UNIT RH: POSITIVE

## 2022-04-04 ENCOUNTER — OFFICE VISIT (OUTPATIENT)
Dept: CARDIOLOGY | Facility: CLINIC | Age: 72
End: 2022-04-04
Payer: COMMERCIAL

## 2022-04-04 VITALS
RESPIRATION RATE: 20 BRPM | BODY MASS INDEX: 39.86 KG/M2 | DIASTOLIC BLOOD PRESSURE: 68 MMHG | HEART RATE: 82 BPM | WEIGHT: 263 LBS | OXYGEN SATURATION: 97 % | SYSTOLIC BLOOD PRESSURE: 134 MMHG | HEIGHT: 68 IN

## 2022-04-04 DIAGNOSIS — R06.83 SNORES: ICD-10-CM

## 2022-04-04 DIAGNOSIS — E78.00 PURE HYPERCHOLESTEROLEMIA: ICD-10-CM

## 2022-04-04 DIAGNOSIS — E11.40 TYPE 2 DIABETES MELLITUS WITH DIABETIC NEUROPATHY, WITHOUT LONG-TERM CURRENT USE OF INSULIN (CMS/HCC): ICD-10-CM

## 2022-04-04 DIAGNOSIS — Z95.2 S/P TAVR (TRANSCATHETER AORTIC VALVE REPLACEMENT): ICD-10-CM

## 2022-04-04 DIAGNOSIS — I10 ESSENTIAL HYPERTENSION: Primary | ICD-10-CM

## 2022-04-04 DIAGNOSIS — I48.91 ATRIAL FIBRILLATION, UNSPECIFIED TYPE (CMS/HCC): ICD-10-CM

## 2022-04-04 PROBLEM — I25.84 CORONARY ARTERY DISEASE DUE TO CALCIFIED CORONARY LESION: Status: ACTIVE | Noted: 2022-04-04

## 2022-04-04 PROBLEM — I25.10 CORONARY ARTERY DISEASE DUE TO CALCIFIED CORONARY LESION: Status: ACTIVE | Noted: 2022-04-04

## 2022-04-04 PROBLEM — Z98.890 S/P CARDIAC CATH: Status: ACTIVE | Noted: 2022-04-04

## 2022-04-04 PROCEDURE — 3078F DIAST BP <80 MM HG: CPT | Performed by: INTERNAL MEDICINE

## 2022-04-04 PROCEDURE — 99213 OFFICE O/P EST LOW 20 MIN: CPT | Performed by: INTERNAL MEDICINE

## 2022-04-04 PROCEDURE — 3075F SYST BP GE 130 - 139MM HG: CPT | Performed by: INTERNAL MEDICINE

## 2022-04-04 PROCEDURE — 3008F BODY MASS INDEX DOCD: CPT | Performed by: INTERNAL MEDICINE

## 2022-04-04 PROCEDURE — 93000 ELECTROCARDIOGRAM COMPLETE: CPT | Performed by: INTERNAL MEDICINE

## 2022-04-04 NOTE — LETTER
April 4, 2022     Ángel Cristobal MD  [address] [facility] [address]    Patient: Yared Hensley  YOB: 1950  Date of Visit: 4/4/2022      Dear Dr. Cristobal:    Thank you for referring Yared Hensley to me for evaluation. Below are my notes for this consultation.    If you have questions, please do not hesitate to call me. I look forward to following your patient along with you.         Sincerely,        Susanna Miller MD        CC: MD Angela Barry, Susanna GOMEZ MD  4/4/2022  4:42 PM  Signed       Susanna Miller MD       Reason for visit : Follow up  HPI   Yared Hensley is a 71 y.o. male who presents to the office for cardiovascular follow up.      Dear Ángel,    On April 4, 2022 I saw Yared Hensley in the office for his first postoperative visit.  As you know on April 30 he had a TAVR done by Dr. Woodrow Irwin.  #29 sapient 3 valve inserted without difficulty.  He did develop a new left bundle branch block intraoperatively and a temporary pacer was inserted.  Within 24 hours his QRS complex narrowed.    When he came to see me originally several weeks ago he was found to be in atrial fibrillation.  He had absolutely no symptoms.  He was started on Eliquis at that time.  He also has hypertension, diabetes, and BPH.  He does have hyperlipidemia.    Since discharge he is feeling well.  He denies any significant shortness of breath.  It is hard for him to tell me whether his fatigue has improved, because he is really not done very much in the way of physical activity.  He continues to snore.  When discharge, his beta-blocker was reduced to 100 mg daily because of the left bundle branch block that was noted early on after his TAVR.    He did have a cardiac catheterization prior to his TAVR which showed a 60% tubular mid LAD lesion.  His right coronary artery and circumflex had mild nonobstructive disease.  Left ventricular function is known to be normal with an estimated ejection  fraction of 65%.  He does have known diastolic dysfunction.    I have reviewed his allergies, medications, medical history, social history, surgical history.  None of this is changed other than the above-mentioned.  The rest of his review of systems is completely negative.       Problem List:  2022-04: S/P TAVR (transcatheter aortic valve replacement)  2022-04: Snores  2022-04: Coronary artery disease due to calcified coronary lesion  2022-04: S/P cardiac cath  2022-03: Severe aortic stenosis  2022-03: CHF (congestive heart failure), NYHA class III, acute on   chronic, diastolic (CMS/[facility])  2022-02: Nonrheumatic aortic valve stenosis  2022-01: Atrial fibrillation (CMS/[facility])  2017-12: Pure hypercholesterolemia  2017-12: Morbid obesity (CMS/[facility])  2017-12: Benign prostatic hyperplasia with nocturia  2017-03: Type 2 diabetes mellitus with diabetic neuropathy,   unspecified (CMS/[facility])  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   • amLODIPine (NORVASC) 10 mg tablet Take 10 mg by mouth daily.   • apixaban (ELIQUIS) 5 mg tablet Take 1 tablet (5 mg total) by mouth 2 (two) times a day.   • aspirin 81 mg enteric coated tablet Take 1 tablet (81 mg total) by mouth daily.   • glipiZIDE (GLUCOTROL) 5 mg tablet 10 mg 2 (two) times a day with meals.   • hydrochlorothiazide (MICROZIDE) 12.5 mg capsule Take 12.5 mg by mouth daily.   • JANUVIA 100 mg tablet Take 100 mg by mouth daily.   • lisinopriL (PRINIVIL) 40 mg tablet Take 40 mg by mouth 2 (two) times a day.   • metFORMIN (GLUCOPHAGE) 500 mg tablet 1,000 mg 2 (two) times a day with meals.     • metoprolol succinate XL (TOPROL-XL) 100 mg 24 hr tablet Take 1 tablet (100 mg total) by mouth daily.   • REPATHA SYRINGE 140 mg/mL syringe syringe Inject 140 mg under the skin.   • spironolactone (ALDACTONE) 25 mg tablet Take 25  "mg by mouth daily.   • tamsulosin (FLOMAX) 0.4 mg capsule Take 0.4 mg by mouth daily.   • vit A/vit C/vit E/zinc/copper (PRESERVISION AREDS ORAL) Take by mouth 2 times daily.   • vitamin E 200 unit capsule Take 200 Units by mouth daily.   • ZINC ORAL Take 50 mg by mouth.   • ONETOUCH ULTRA TEST strip      No current facility-administered medications for this visit.          Allergies:  Patient has no known allergies.    Surgical History:  Past Surgical History:   Procedure Laterality Date   • AORTIC VALVE REPLACEMENT     • COLONOSCOPY     • SKIN BIOPSY      excision of basal cell ca on scalp        Family History:  Family History   Problem Relation Age of Onset   • Hypertension Biological Mother    • Heart disease Biological Father    • Hypertension Biological Father         Social History:  Social History     Socioeconomic History   • Marital status:      Spouse name: None   • Number of children: 3   • Years of education: None   • Highest education level: None   Occupational History   • Occupation: radiation oncologist   Tobacco Use   • Smoking status: Never Smoker   • Smokeless tobacco: Never Used   Vaping Use   • Vaping Use: Never used   Substance and Sexual Activity   • Alcohol use: Yes     Alcohol/week: 2.0 standard drinks     Types: 2 Glasses of wine per week     Comment: rarely   • Drug use: Never   • Sexual activity: Defer   Social History Narrative    Lives at home with wife and 3 daughters, two story home           Review of Systems    Rest of his review of systems is completely negative.       Objective   Vitals:    04/04/22 1537   BP: 134/68   BP Location: Left upper arm   Patient Position: Sitting   Pulse: 82   Resp: 20   SpO2: 97%   Weight: 119 kg (263 lb)   Height: 1.727 m (5' 8\")     Physical Exam  Pressure is 134/68.  Heart rate is 82 and mildly irregular.  He is comfortable.  Skin is warm and dry.  Conjunctiva are pink.  Affect is appropriate.  No JVD or HJR.  Carotids are " unremarkable.  Chest is clear.  Mildly irregular rhythm with somewhat distant heart tones.  He does have an early systolic murmur at the aortic area and left sternal border.  S1 and S2 are normal.  I heard no diastolic sounds.  There were no gallops.  Abdomen is obese and soft with good bowel sounds.  No obvious organomegaly.  No clubbing, cyanosis, or edema.  I felt no distal pulses.  No rash.  No obvious musculoskeletal deformity.       Labs:   Lab Results   Component Value Date    WBC 8.04 03/31/2022    HGB 14.2 03/31/2022    HCT 41.2 03/31/2022     03/31/2022    ALT 27 03/22/2022    AST 22 03/22/2022     03/31/2022    K 3.8 03/31/2022     03/31/2022    CREATININE 0.9 03/31/2022    BUN 18 03/31/2022    CO2 22 03/31/2022    INR 1.1 03/30/2022    HGBA1C 5.3 03/22/2022       ECG :  His EKG today showed atrial fibrillation with a new right bundle branch block.    Cardiac Imaging    TRANSTHORACIC ECHO (TTE) COMPLETE 03/31/2022    Interpretation Summary  Very technically difficult study.    1. The left ventricular cavity is normal in size, with mild concentric left ventricular hypertrophy and normal systolic function. The estimated ejection fraction is 65 %.  Cannot rule out regional wall motion abnormalities.  Diastolic parameters not quantified in the setting of mitral annular calcification.  2. S/P 29mm Noel 3 transcatheter aortic valve replacement with peak velocity of 2.8 m/s, mean gradient of 16 mmHg with estimated orifice of 2.0 cm2.  The dimensionless index is 0.44.  The acceleration time is 63 ms.  No transvalvular aortic regurgitation. No paravalvular aortic regurgitation.  3.  The aortic root has postprocedural changes.  The rest of the aorta is not well seen.  4. The mitral valve leaflets are thickened. There is mitral annular calcification. Trace mitral regurgitation.  5. The left atrium is severely enlarged (49 mL/m2).  6. The right ventricle is not well seen, probably normal in size,  with normal function. There is a pacemaker/ICD lead in place.  7. The right atrium is not well seen.  8. The tricuspid valve is not well seen. Trace tricuspid regurgitation with insufficient jet to estimate right ventricular systolic pressure.  9. The pulmonic valve not well seen.  10. The inferior vena cava is normal in size with greater than 50% respiratory variation consistent with normal right-sided filling pressures (estimated right atrial pressure of 3 mmHg).  11. No pericardial effusion.    Compared to previous intraprocedural transthoracic echocardiogram, the peak transvalvular velocity and mean gradient is elevated.  The dimensionless index is within normal range and the acceleration time is within normal limits.  Findings may be suggestive of a high flow state.           Problem List Items Addressed This Visit        Nervous    Type 2 diabetes mellitus with diabetic neuropathy, unspecified (CMS/HCC)       Respiratory    Snores       Circulatory    Essential hypertension - Primary    Relevant Orders    ECG 12 lead (Completed)    Atrial fibrillation (CMS/HCC)    Relevant Orders    ECG 12 lead (Completed)    [facility] [employer] Holter monitor - 24 hour    S/P TAVR (transcatheter aortic valve replacement)    Relevant Orders    Ambulatory Referral to Cardiac Rehab       Endocrine/Metabolic    Pure hypercholesterolemia    Relevant Orders    ECG 12 lead (Completed)    Lipid panel    Hepatic function panel          Impression:    Clinically, Elbert is stable.  His blood pressures are actually reasonably controlled.  I told him to keep an eye on these over the next few weeks, and if they should go up, we would need to make some adjustments.    His TAVR sounds good.  He has an expected soft murmur.  There was no evidence of any diastolic sound.    He now has a new right bundle branch block.  I will be doing a Holter monitor to see if he has any further evidence of an additional left bundle branch block throughout the course of an  average day.  I would not increase his beta-blocker at all at this point time.    Elbert understands that he does have mild to moderate coronary disease.  His LDL cholesterol needs to be kept at 70 or less.  Lipids will be done in approximately 2 months.    I am certain he has sleep apnea.  He tells me he will get a sleep study arranged for himself.    He understands the absolute need for weight loss.  It was my advice that he start doing some exercise, and he will be starting in cardiac rehab as an outpatient in [place].  He also understands need for antibiotic prophylaxis prior to any dental procedures.  I will see him again in 3 months for reevaluation unless he should need be sooner.    I thank you for allowing me to participate in the care of your patient.  If I can furnish you with any further details, please do not hesitate to contact me.     Susanna Miller MD  4/4/2022    This document was generated utilizing voice recognition technology. A reasonable attempt at proofreading has been made to minimize errors but please excuse any typographical errors which may be present. Please call with any questions.

## 2022-04-04 NOTE — PROGRESS NOTES
Susanna Miller MD       Reason for visit : Follow up  HPI   Yared Hensley is a 71 y.o. male who presents to the office for cardiovascular follow up.      Dear Ángel,    On April 4, 2022 I saw Yared Hensley in the office for his first postoperative visit.  As you know on April 30 he had a TAVR done by Dr. Woodrow Irwin.  #29 sapient 3 valve inserted without difficulty.  He did develop a new left bundle branch block intraoperatively and a temporary pacer was inserted.  Within 24 hours his QRS complex narrowed.    When he came to see me originally several weeks ago he was found to be in atrial fibrillation.  He had absolutely no symptoms.  He was started on Eliquis at that time.  He also has hypertension, diabetes, and BPH.  He does have hyperlipidemia.    Since discharge he is feeling well.  He denies any significant shortness of breath.  It is hard for him to tell me whether his fatigue has improved, because he is really not done very much in the way of physical activity.  He continues to snore.  When discharge, his beta-blocker was reduced to 100 mg daily because of the left bundle branch block that was noted early on after his TAVR.    He did have a cardiac catheterization prior to his TAVR which showed a 60% tubular mid LAD lesion.  His right coronary artery and circumflex had mild nonobstructive disease.  Left ventricular function is known to be normal with an estimated ejection fraction of 65%.  He does have known diastolic dysfunction.    I have reviewed his allergies, medications, medical history, social history, surgical history.  None of this is changed other than the above-mentioned.  The rest of his review of systems is completely negative.       Problem List:  2022-04: S/P TAVR (transcatheter aortic valve replacement)  2022-04: Snores  2022-04: Coronary artery disease due to calcified coronary lesion  2022-04: S/P cardiac cath  2022-03: Severe aortic stenosis  2022-03: CHF (congestive heart  failure), NYHA class III, acute on   chronic, diastolic (CMS/[facility])  2022-02: Nonrheumatic aortic valve stenosis  2022-01: Atrial fibrillation (CMS/[facility])  2017-12: Pure hypercholesterolemia  2017-12: Morbid obesity (CMS/[facility])  2017-12: Benign prostatic hyperplasia with nocturia  2017-03: Type 2 diabetes mellitus with diabetic neuropathy,   unspecified (CMS/[facility])  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   • amLODIPine (NORVASC) 10 mg tablet Take 10 mg by mouth daily.   • apixaban (ELIQUIS) 5 mg tablet Take 1 tablet (5 mg total) by mouth 2 (two) times a day.   • aspirin 81 mg enteric coated tablet Take 1 tablet (81 mg total) by mouth daily.   • glipiZIDE (GLUCOTROL) 5 mg tablet 10 mg 2 (two) times a day with meals.   • hydrochlorothiazide (MICROZIDE) 12.5 mg capsule Take 12.5 mg by mouth daily.   • JANUVIA 100 mg tablet Take 100 mg by mouth daily.   • lisinopriL (PRINIVIL) 40 mg tablet Take 40 mg by mouth 2 (two) times a day.   • metFORMIN (GLUCOPHAGE) 500 mg tablet 1,000 mg 2 (two) times a day with meals.     • metoprolol succinate XL (TOPROL-XL) 100 mg 24 hr tablet Take 1 tablet (100 mg total) by mouth daily.   • REPATHA SYRINGE 140 mg/mL syringe syringe Inject 140 mg under the skin.   • spironolactone (ALDACTONE) 25 mg tablet Take 25 mg by mouth daily.   • tamsulosin (FLOMAX) 0.4 mg capsule Take 0.4 mg by mouth daily.   • vit A/vit C/vit E/zinc/copper (PRESERVISION AREDS ORAL) Take by mouth 2 times daily.   • vitamin E 200 unit capsule Take 200 Units by mouth daily.   • ZINC ORAL Take 50 mg by mouth.   • ONETOUCH ULTRA TEST strip      No current facility-administered medications for this visit.          Allergies:  Patient has no known allergies.    Surgical History:  Past Surgical History:   Procedure Laterality Date   • AORTIC VALVE REPLACEMENT     •  "COLONOSCOPY     • SKIN BIOPSY      excision of basal cell ca on scalp        Family History:  Family History   Problem Relation Age of Onset   • Hypertension Biological Mother    • Heart disease Biological Father    • Hypertension Biological Father         Social History:  Social History     Socioeconomic History   • Marital status:      Spouse name: None   • Number of children: 3   • Years of education: None   • Highest education level: None   Occupational History   • Occupation: radiation oncologist   Tobacco Use   • Smoking status: Never Smoker   • Smokeless tobacco: Never Used   Vaping Use   • Vaping Use: Never used   Substance and Sexual Activity   • Alcohol use: Yes     Alcohol/week: 2.0 standard drinks     Types: 2 Glasses of wine per week     Comment: rarely   • Drug use: Never   • Sexual activity: Defer   Social History Narrative    Lives at home with wife and 3 daughters, two story home           Review of Systems    Rest of his review of systems is completely negative.       Objective   Vitals:    04/04/22 1537   BP: 134/68   BP Location: Left upper arm   Patient Position: Sitting   Pulse: 82   Resp: 20   SpO2: 97%   Weight: 119 kg (263 lb)   Height: 1.727 m (5' 8\")     Physical Exam  Pressure is 134/68.  Heart rate is 82 and mildly irregular.  He is comfortable.  Skin is warm and dry.  Conjunctiva are pink.  Affect is appropriate.  No JVD or HJR.  Carotids are unremarkable.  Chest is clear.  Mildly irregular rhythm with somewhat distant heart tones.  He does have an early systolic murmur at the aortic area and left sternal border.  S1 and S2 are normal.  I heard no diastolic sounds.  There were no gallops.  Abdomen is obese and soft with good bowel sounds.  No obvious organomegaly.  No clubbing, cyanosis, or edema.  I felt no distal pulses.  No rash.  No obvious musculoskeletal deformity.       Labs:   Lab Results   Component Value Date    WBC 8.04 03/31/2022    HGB 14.2 03/31/2022    HCT 41.2 " 03/31/2022     03/31/2022    ALT 27 03/22/2022    AST 22 03/22/2022     03/31/2022    K 3.8 03/31/2022     03/31/2022    CREATININE 0.9 03/31/2022    BUN 18 03/31/2022    CO2 22 03/31/2022    INR 1.1 03/30/2022    HGBA1C 5.3 03/22/2022       ECG :  His EKG today showed atrial fibrillation with a new right bundle branch block.    Cardiac Imaging    TRANSTHORACIC ECHO (TTE) COMPLETE 03/31/2022    Interpretation Summary  Very technically difficult study.    1. The left ventricular cavity is normal in size, with mild concentric left ventricular hypertrophy and normal systolic function. The estimated ejection fraction is 65 %.  Cannot rule out regional wall motion abnormalities.  Diastolic parameters not quantified in the setting of mitral annular calcification.  2. S/P 29mm Noel 3 transcatheter aortic valve replacement with peak velocity of 2.8 m/s, mean gradient of 16 mmHg with estimated orifice of 2.0 cm2.  The dimensionless index is 0.44.  The acceleration time is 63 ms.  No transvalvular aortic regurgitation. No paravalvular aortic regurgitation.  3.  The aortic root has postprocedural changes.  The rest of the aorta is not well seen.  4. The mitral valve leaflets are thickened. There is mitral annular calcification. Trace mitral regurgitation.  5. The left atrium is severely enlarged (49 mL/m2).  6. The right ventricle is not well seen, probably normal in size, with normal function. There is a pacemaker/ICD lead in place.  7. The right atrium is not well seen.  8. The tricuspid valve is not well seen. Trace tricuspid regurgitation with insufficient jet to estimate right ventricular systolic pressure.  9. The pulmonic valve not well seen.  10. The inferior vena cava is normal in size with greater than 50% respiratory variation consistent with normal right-sided filling pressures (estimated right atrial pressure of 3 mmHg).  11. No pericardial effusion.    Compared to previous intraprocedural  transthoracic echocardiogram, the peak transvalvular velocity and mean gradient is elevated.  The dimensionless index is within normal range and the acceleration time is within normal limits.  Findings may be suggestive of a high flow state.           Problem List Items Addressed This Visit        Nervous    Type 2 diabetes mellitus with diabetic neuropathy, unspecified (CMS/HCC)       Respiratory    Snores       Circulatory    Essential hypertension - Primary    Relevant Orders    ECG 12 lead (Completed)    Atrial fibrillation (CMS/HCC)    Relevant Orders    ECG 12 lead (Completed)    LHG  Holter monitor - 24 hour    S/P TAVR (transcatheter aortic valve replacement)    Relevant Orders    Ambulatory Referral to Cardiac Rehab       Endocrine/Metabolic    Pure hypercholesterolemia    Relevant Orders    ECG 12 lead (Completed)    Lipid panel    Hepatic function panel          Impression:    Clinically, Elbert is stable.  His blood pressures are actually reasonably controlled.  I told him to keep an eye on these over the next few weeks, and if they should go up, we would need to make some adjustments.    His TAVR sounds good.  He has an expected soft murmur.  There was no evidence of any diastolic sound.    He now has a new right bundle branch block.  I will be doing a Holter monitor to see if he has any further evidence of an additional left bundle branch block throughout the course of an average day.  I would not increase his beta-blocker at all at this point time.    Elbert understands that he does have mild to moderate coronary disease.  His LDL cholesterol needs to be kept at 70 or less.  Lipids will be done in approximately 2 months.    I am certain he has sleep apnea.  He tells me he will get a sleep study arranged for himself.    He understands the absolute need for weight loss.  It was my advice that he start doing some exercise, and he will be starting in cardiac rehab as an outpatient in [place].  He also  understands need for antibiotic prophylaxis prior to any dental procedures.  I will see him again in 3 months for reevaluation unless he should need be sooner.    I thank you for allowing me to participate in the care of your patient.  If I can furnish you with any further details, please do not hesitate to contact me.     Susanna Miller MD  4/4/2022    This document was generated utilizing voice recognition technology. A reasonable attempt at proofreading has been made to minimize errors but please excuse any typographical errors which may be present. Please call with any questions.

## 2022-05-02 ASSESSMENT — ENCOUNTER SYMPTOMS
DIZZINESS: 0
PALPITATIONS: 0
BACK PAIN: 0
CONFUSION: 0
WOUND: 0
CHEST TIGHTNESS: 0
AGITATION: 0
COUGH: 0
ARTHRALGIAS: 0
WEAKNESS: 0
DIFFICULTY URINATING: 0
SHORTNESS OF BREATH: 0
NERVOUS/ANXIOUS: 0
NAUSEA: 0
FATIGUE: 0

## 2022-05-02 NOTE — PROGRESS NOTES
HPI  Patient is a 71 y.o. male  who presents s/p #29 Noel transfemoral TAVR on March 30, 2022.  He was discharged on postoperative day #1. Since his discharge he has had some hematuria.  He has been treated for a UTi with cipro since his TAVR.  He has some pain in his right flank.  He is in cardiac rehab.  His wife does notice he is less short of breath.  His energy level is up and down.      Medical History:   Past Medical History:   Diagnosis Date   • Atrial fibrillation (CMS/HCC) 01/28/2022   • Basal cell carcinoma     scalp   • GERD (gastroesophageal reflux disease)    • Hyperlipidemia    • Hypertension    • S/P TAVR (transcatheter aortic valve replacement) 4/4/2022    Transfemoral TAVR #29 Noel on 3-   • TIA (transient ischemic attack)    • Type 2 diabetes mellitus (CMS/HCC)        Surgical History:   Past Surgical History:   Procedure Laterality Date   • AORTIC VALVE REPLACEMENT     • COLONOSCOPY     • SKIN BIOPSY      excision of basal cell ca on scalp       Allergies: Patient has no known allergies.    Current Outpatient Medications   Medication Sig Dispense Refill   • alfuzosin (UROXATRAL) 10 mg 24 hr tablet Take 10 mg by mouth daily.     • amLODIPine (NORVASC) 10 mg tablet Take 10 mg by mouth daily.     • apixaban (ELIQUIS) 5 mg tablet Take 1 tablet (5 mg total) by mouth 2 (two) times a day. 180 tablet 1   • aspirin 81 mg enteric coated tablet Take 1 tablet (81 mg total) by mouth daily. 30 tablet 3   • glipiZIDE (GLUCOTROL) 5 mg tablet 10 mg 2 (two) times a day with meals.     • hydrochlorothiazide (MICROZIDE) 12.5 mg capsule Take 12.5 mg by mouth daily.     • JANUVIA 100 mg tablet Take 100 mg by mouth daily.     • lisinopriL (PRINIVIL) 40 mg tablet Take 40 mg by mouth 2 (two) times a day.     • metFORMIN (GLUCOPHAGE) 500 mg tablet 1,000 mg 2 (two) times a day with meals.       • metoprolol succinate XL (TOPROL-XL) 100 mg 24 hr tablet Take 1 tablet (100 mg total) by mouth daily. 30 tablet 0   •  ONETOUCH ULTRA TEST strip      • REPATHA SYRINGE 140 mg/mL syringe syringe Inject 140 mg under the skin.     • spironolactone (ALDACTONE) 25 mg tablet Take 25 mg by mouth daily.     • tamsulosin (FLOMAX) 0.4 mg capsule Take 0.4 mg by mouth daily.     • vit A/vit C/vit E/zinc/copper (PRESERVISION AREDS ORAL) Take by mouth 2 times daily.     • vitamin E 200 unit capsule Take 200 Units by mouth daily.     • ZINC ORAL Take 50 mg by mouth.       No current facility-administered medications for this visit.       Social History:   Social History     Socioeconomic History   • Marital status:      Spouse name: None   • Number of children: 3   • Years of education: None   • Highest education level: None   Occupational History   • Occupation: radiation oncologist   Tobacco Use   • Smoking status: Never Smoker   • Smokeless tobacco: Never Used   Vaping Use   • Vaping Use: Never used   Substance and Sexual Activity   • Alcohol use: Yes     Alcohol/week: 2.0 standard drinks     Types: 2 Glasses of wine per week     Comment: rarely   • Drug use: Never   • Sexual activity: Defer   Social History Narrative    Lives at home with wife and 3 daughters, two story home       Family History:   Family History   Problem Relation Age of Onset   • Hypertension Biological Mother    • Heart disease Biological Father    • Hypertension Biological Father        Review of Systems  Review of Systems   Constitutional: Negative for fatigue.   HENT: Negative for postnasal drip.    Eyes: Negative for visual disturbance.   Respiratory: Negative for cough, chest tightness and shortness of breath.    Cardiovascular: Negative for chest pain, palpitations and leg swelling.   Gastrointestinal: Negative for nausea.   Genitourinary: Positive for hematuria. Negative for difficulty urinating.   Musculoskeletal: Negative for arthralgias, back pain and gait problem.   Skin: Negative for wound.   Neurological: Negative for dizziness, syncope and weakness.  "  Psychiatric/Behavioral: Negative for agitation, behavioral problems and confusion. The patient is not nervous/anxious.    All other systems reviewed and are negative.      Objective     Vital Signs for the last 24 hours:  Visit Vitals  /60 (BP Location: Left upper arm, Patient Position: Sitting)   Pulse 93   Temp 36.8 °C (98.2 °F) (Temporal)   Resp 18   Ht 1.727 m (5' 8\")   Wt 119 kg (263 lb)   SpO2 96%   BMI 39.99 kg/m²       Physical Exam  Vitals reviewed.   Constitutional:       General: He is not in acute distress.     Appearance: Normal appearance. He is well-developed and normal weight.   HENT:      Head: Normocephalic.   Eyes:      Pupils: Pupils are equal, round, and reactive to light.   Neck:      Vascular: No JVD.   Cardiovascular:      Rate and Rhythm: Normal rate and regular rhythm.      Pulses: Normal pulses.      Heart sounds: Normal heart sounds. No murmur heard.  Pulmonary:      Effort: Pulmonary effort is normal.      Breath sounds: Normal breath sounds.   Abdominal:      General: Bowel sounds are normal.      Palpations: Abdomen is soft.   Musculoskeletal:         General: No swelling. Normal range of motion.      Cervical back: Normal range of motion and neck supple.      Right lower leg: No edema.      Left lower leg: No edema.   Skin:     General: Skin is warm and dry.      Capillary Refill: Capillary refill takes 2 to 3 seconds.   Neurological:      General: No focal deficit present.      Mental Status: He is alert and oriented to person, place, and time. Mental status is at baseline.   Psychiatric:         Mood and Affect: Mood normal.         Behavior: Behavior normal.         Thought Content: Thought content normal.         Judgment: Judgment normal.       Assessment/Plan   Assessment:  S/P #29 Noel transfemoral TAVR  Echo reviewed by Dr. Irwin  The most recent echocardiogram shows normal function of the prosthetic valve.  KRISHNA  2.3  ,Mean gradient 15mmHG, LVOT velocity high at " 1.5m/sec- causing Pvelocity to also be higher.  Tricuspid regurgitation trace, EF 65 %.  No paravalvular AI, No central AI.  [facility].  Patient remains on daily diuretics.  There have been no signs or symptoms of congestive heart failure.  No chest discomfort.  No syncopal or presyncopal events.  The patient is tolerating medical therapy.  The patient was instructed regarding appropriate antibiotic prophylaxis.  Recommend waiting 6 months after surgery for next cleaning.  We recommend continuing to monitor the valve by serial echocardiograms.  Plan:  Ongoing medical management with cardiology.  No physical limitations or restrictions at this time.  Continue Eliquis for Afib purposes.  Follow-up in 1 year with an echo.    I spent 30 minutes on this date of service performing the following activities: obtaining history, performing examination, entering orders, documenting, providing counseling and education, independently reviewing study/studies, communicating results, and coordinating care.       I Brigid PERDOMO, am scribing for and in the presence of CONOR Blanco     I, Woodrow Irwin MD, personally performed the services described in this documentation as scribed by CONOR Melchor in my presence, and it is both accurate and complete.    5/11/2022 12:19 PM

## 2022-05-10 ENCOUNTER — HOSPITAL ENCOUNTER (OUTPATIENT)
Dept: CARDIOLOGY | Facility: HOSPITAL | Age: 72
Discharge: HOME | End: 2022-05-10
Attending: NURSE PRACTITIONER
Payer: COMMERCIAL

## 2022-05-10 ENCOUNTER — OFFICE VISIT (OUTPATIENT)
Dept: CARDIOTHORACIC SURGERY | Facility: CLINIC | Age: 72
End: 2022-05-10
Payer: COMMERCIAL

## 2022-05-10 VITALS
BODY MASS INDEX: 39.86 KG/M2 | OXYGEN SATURATION: 96 % | HEART RATE: 93 BPM | TEMPERATURE: 98.2 F | RESPIRATION RATE: 18 BRPM | SYSTOLIC BLOOD PRESSURE: 119 MMHG | DIASTOLIC BLOOD PRESSURE: 60 MMHG | HEIGHT: 68 IN | WEIGHT: 263 LBS

## 2022-05-10 VITALS
BODY MASS INDEX: 39.86 KG/M2 | SYSTOLIC BLOOD PRESSURE: 105 MMHG | DIASTOLIC BLOOD PRESSURE: 65 MMHG | WEIGHT: 263 LBS | HEIGHT: 68 IN | HEART RATE: 67 BPM

## 2022-05-10 DIAGNOSIS — Z95.2 S/P TAVR (TRANSCATHETER AORTIC VALVE REPLACEMENT): Primary | ICD-10-CM

## 2022-05-10 DIAGNOSIS — Z95.2 S/P TAVR (TRANSCATHETER AORTIC VALVE REPLACEMENT): ICD-10-CM

## 2022-05-10 LAB
AORTIC ROOT ANNULUS - M-MODE: 2.71 CM
AORTIC VALVE AREA: 0.96 SQUARE CENTIMETERS PER SQUARE METER
AORTIC VALVE AT: 77.35 MS
AORTIC VALVE MEAN VELOCITY: 2.17 M/S
AORTIC VALVE VELOCITY TIME INTEGRAL: 54.24 CM
AV MEAN GRADIENT: 21.46 MMHG
AV PEAK GRADIENT: 36.33 MMHG
AV PEAK VELOCITY-S: 3.01 M/S
AV REGURGITATION PRESSURE HALF TIME: 368.67 MS
BSA FOR ECHO PROCEDURE: 2.39 M2
DOP CALC LVOT STROKE VOLUME: 134.98 ML
DOP CALC RVOT VTI: 9.77 CM
E WAVE DECELERATION TIME: 223.91 MS
E/E' RATIO: 20.62
E/LAT E' RATIO: 13.7
EDV (BP): 94.03 ML
EF (A4C): 60.71 %
EF A2C: 59.76 %
EJECTION FRACTION: 60.94 %
ESV (BP): 36.73 ML
INTERVENTRICULAR SEPTUM: 1.31 CM
LA ESV (BP): 133.91 ML
LA ESV INDEX (A2C): 60.29 ML/M2
LA ESV INDEX (BP): 56.03 ML/M2
LA/AORTA RATIO: 1.87
LAAS-AP2: 34.31 CM2
LAAS-AP4: 31.83 CM2
LAD 2D - M-MODE: 5.07 CM
LAV-S: 144.1 ML
LEFT ATRIUM VOLUME INDEX: 50.79 ML/M2
LEFT ATRIUM VOLUME: 121.4 ML
LEFT INTERNAL DIMENSION IN SYSTOLE: 2.28 CM (ref 4.37–6.62)
LEFT VENTRICLE DIASTOLIC VOLUME INDEX: 48.24 ML/M2
LEFT VENTRICLE DIASTOLIC VOLUME: 115.3 ML
LEFT VENTRICLE MASS INDEX: 77.42 G/M2
LEFT VENTRICLE SYSTOLIC VOLUME INDEX: 18.95 ML/M2
LEFT VENTRICLE SYSTOLIC VOLUME: 45.3 ML
LEFT VENTRICULAR INTERNAL DIMENSION IN DIASTOLE: 3.83 CM (ref 7.56–10.51)
LEFT VENTRICULAR MASS: 185.03 G
LEFT VENTRICULAR POSTERIOR WALL IN END DIASTOLE: 1.38 CM (ref 0.89–1.66)
LV DIASTOLIC VOLUME: 67.6 ML
LV ESV (APICAL 2 CHAMBER): 27.2 ML
LVCI: 2.05 LITERS PER MINUTE PER SQUARE METER
LVCO: 4.7 LITERS PER MINUTE
LVEDVI(A2C): 28.28 ML/M2
LVEDVI(BP): 39.34 ML/M2
LVESVI(A2C): 11.38 ML/M2
LVESVI(BP): 15.37 ML/M2
LVOT 2D: 2.5 CM
LVOT A: 4.9 CM2
LVOT MG: 4.65 MMHG
LVOT MV: 1.02 M/S
LVOT PEAK VELOCITY: 1.35 M/S
LVOT PG: 7.34 MMHG
LVOT VTI: 27.51 CM
MV E'TISSUE VEL-LAT: 0.13 M/S
MV E'TISSUE VEL-MED: 0.08 M/S
MV PEAK E VEL: 1.73 M/S
MV VALVE AREA P 1/2 METHOD: 3.39 CM2
PULM VEIN S/D RATIO: 0.39
PV PEAK D VEL: 0.6 M/S
PV PEAK S VEL: 0.23 M/S
TRICUSPID VALVE PEAK REGURGITATION VELOCITY: 2.55 M/S
Z-SCORE OF LEFT VENTRICULAR DIMENSION IN END DIASTOLE: -8.45
Z-SCORE OF LEFT VENTRICULAR DIMENSION IN END SYSTOLE: -6.78
Z-SCORE OF LEFT VENTRICULAR POSTERIOR WALL IN END DIASTOLE: 0.68

## 2022-05-10 PROCEDURE — 93306 TTE W/DOPPLER COMPLETE: CPT | Mod: 26 | Performed by: INTERNAL MEDICINE

## 2022-05-10 PROCEDURE — 99214 OFFICE O/P EST MOD 30 MIN: CPT | Performed by: THORACIC SURGERY (CARDIOTHORACIC VASCULAR SURGERY)

## 2022-05-10 PROCEDURE — 3008F BODY MASS INDEX DOCD: CPT | Performed by: THORACIC SURGERY (CARDIOTHORACIC VASCULAR SURGERY)

## 2022-05-10 PROCEDURE — 93306 TTE W/DOPPLER COMPLETE: CPT

## 2022-05-10 PROCEDURE — 3074F SYST BP LT 130 MM HG: CPT | Performed by: THORACIC SURGERY (CARDIOTHORACIC VASCULAR SURGERY)

## 2022-05-10 PROCEDURE — 3078F DIAST BP <80 MM HG: CPT | Performed by: THORACIC SURGERY (CARDIOTHORACIC VASCULAR SURGERY)

## 2022-05-10 ASSESSMENT — ENCOUNTER SYMPTOMS: HEMATURIA: 1

## 2022-05-10 NOTE — LETTER
May 11, 2022     Ángel Cristobal MD  [address] [id number]    Patient: Yared Hensley  YOB: 1950  Date of Visit: 5/10/2022      Dear Dr. Cristobal:    Thank you for referring Yared Hensley to me for evaluation. Below are my notes for this consultation.    If you have questions, please do not hesitate to call me. I look forward to following your patient along with you.         Sincerely,        Woodrow Irwin MD        CC: MD Dc Hendrickson Scott M, MD  5/11/2022 12:19 PM  Signed  HPI  Patient is a 71 y.o. male  who presents s/p #29 Noel transfemoral TAVR on March 30, 2022.  He was discharged on postoperative day #1. Since his discharge he has had some hematuria.  He has been treated for a UTi with cipro since his TAVR.  He has some pain in his right flank.  He is in cardiac rehab.  His wife does notice he is less short of breath.  His energy level is up and down.      Medical History:   Past Medical History:   Diagnosis Date   • Atrial fibrillation (CMS/HCC) 01/28/2022   • Basal cell carcinoma     scalp   • GERD (gastroesophageal reflux disease)    • Hyperlipidemia    • Hypertension    • S/P TAVR (transcatheter aortic valve replacement) 4/4/2022    Transfemoral TAVR #29 Noel on 3-   • TIA (transient ischemic attack)    • Type 2 diabetes mellitus (CMS/HCC)        Surgical History:   Past Surgical History:   Procedure Laterality Date   • AORTIC VALVE REPLACEMENT     • COLONOSCOPY     • SKIN BIOPSY      excision of basal cell ca on scalp       Allergies: Patient has no known allergies.    Current Outpatient Medications   Medication Sig Dispense Refill   • alfuzosin (UROXATRAL) 10 mg 24 hr tablet Take 10 mg by mouth daily.     • amLODIPine (NORVASC) 10 mg tablet Take 10 mg by mouth daily.     • apixaban (ELIQUIS) 5 mg tablet Take 1 tablet (5 mg total) by mouth 2 (two) times a day. 180 tablet 1   • aspirin 81 mg enteric coated tablet Take 1 tablet (81 mg total) by  mouth daily. 30 tablet 3   • glipiZIDE (GLUCOTROL) 5 mg tablet 10 mg 2 (two) times a day with meals.     • hydrochlorothiazide (MICROZIDE) 12.5 mg capsule Take 12.5 mg by mouth daily.     • JANUVIA 100 mg tablet Take 100 mg by mouth daily.     • lisinopriL (PRINIVIL) 40 mg tablet Take 40 mg by mouth 2 (two) times a day.     • metFORMIN (GLUCOPHAGE) 500 mg tablet 1,000 mg 2 (two) times a day with meals.       • metoprolol succinate XL (TOPROL-XL) 100 mg 24 hr tablet Take 1 tablet (100 mg total) by mouth daily. 30 tablet 0   • ONETOUCH ULTRA TEST strip      • REPATHA SYRINGE 140 mg/mL syringe syringe Inject 140 mg under the skin.     • spironolactone (ALDACTONE) 25 mg tablet Take 25 mg by mouth daily.     • tamsulosin (FLOMAX) 0.4 mg capsule Take 0.4 mg by mouth daily.     • vit A/vit C/vit E/zinc/copper (PRESERVISION AREDS ORAL) Take by mouth 2 times daily.     • vitamin E 200 unit capsule Take 200 Units by mouth daily.     • ZINC ORAL Take 50 mg by mouth.       No current facility-administered medications for this visit.       Social History:   Social History     Socioeconomic History   • Marital status:      Spouse name: None   • Number of children: 3   • Years of education: None   • Highest education level: None   Occupational History   • Occupation: radiation oncologist   Tobacco Use   • Smoking status: Never Smoker   • Smokeless tobacco: Never Used   Vaping Use   • Vaping Use: Never used   Substance and Sexual Activity   • Alcohol use: Yes     Alcohol/week: 2.0 standard drinks     Types: 2 Glasses of wine per week     Comment: rarely   • Drug use: Never   • Sexual activity: Defer   Social History Narrative    Lives at home with wife and 3 daughters, two story home       Family History:   Family History   Problem Relation Age of Onset   • Hypertension Biological Mother    • Heart disease Biological Father    • Hypertension Biological Father        Review of Systems  Review of Systems   Constitutional:  "Negative for fatigue.   HENT: Negative for postnasal drip.    Eyes: Negative for visual disturbance.   Respiratory: Negative for cough, chest tightness and shortness of breath.    Cardiovascular: Negative for chest pain, palpitations and leg swelling.   Gastrointestinal: Negative for nausea.   Genitourinary: Positive for hematuria. Negative for difficulty urinating.   Musculoskeletal: Negative for arthralgias, back pain and gait problem.   Skin: Negative for wound.   Neurological: Negative for dizziness, syncope and weakness.   Psychiatric/Behavioral: Negative for agitation, behavioral problems and confusion. The patient is not nervous/anxious.    All other systems reviewed and are negative.      Objective     Vital Signs for the last 24 hours:  Visit Vitals  /60 (BP Location: Left upper arm, Patient Position: Sitting)   Pulse 93   Temp 36.8 °C (98.2 °F) (Temporal)   Resp 18   Ht 1.727 m (5' 8\")   Wt 119 kg (263 lb)   SpO2 96%   BMI 39.99 kg/m²       Physical Exam  Vitals reviewed.   Constitutional:       General: He is not in acute distress.     Appearance: Normal appearance. He is well-developed and normal weight.   HENT:      Head: Normocephalic.   Eyes:      Pupils: Pupils are equal, round, and reactive to light.   Neck:      Vascular: No JVD.   Cardiovascular:      Rate and Rhythm: Normal rate and regular rhythm.      Pulses: Normal pulses.      Heart sounds: Normal heart sounds. No murmur heard.  Pulmonary:      Effort: Pulmonary effort is normal.      Breath sounds: Normal breath sounds.   Abdominal:      General: Bowel sounds are normal.      Palpations: Abdomen is soft.   Musculoskeletal:         General: No swelling. Normal range of motion.      Cervical back: Normal range of motion and neck supple.      Right lower leg: No edema.      Left lower leg: No edema.   Skin:     General: Skin is warm and dry.      Capillary Refill: Capillary refill takes 2 to 3 seconds.   Neurological:      General: No " focal deficit present.      Mental Status: He is alert and oriented to person, place, and time. Mental status is at baseline.   Psychiatric:         Mood and Affect: Mood normal.         Behavior: Behavior normal.         Thought Content: Thought content normal.         Judgment: Judgment normal.       Assessment/Plan   Assessment:  S/P #29 Noel transfemoral TAVR  Echo reviewed by Dr. Irwin  The most recent echocardiogram shows normal function of the prosthetic valve.  KRISHNA  2.3  ,Mean gradient 15mmHG, LVOT velocity high at 1.5m/sec- causing Pvelocity to also be higher.  Tricuspid regurgitation trace, EF 65 %.  No paravalvular AI, No central AI.  [facility] I.  Patient remains on daily diuretics.  There have been no signs or symptoms of congestive heart failure.  No chest discomfort.  No syncopal or presyncopal events.  The patient is tolerating medical therapy.  The patient was instructed regarding appropriate antibiotic prophylaxis.  Recommend waiting 6 months after surgery for next cleaning.  We recommend continuing to monitor the valve by serial echocardiograms.  Plan:  Ongoing medical management with cardiology.  No physical limitations or restrictions at this time.  Continue Eliquis for Afib purposes.  Follow-up in 1 year with an echo.    I spent 30 minutes on this date of service performing the following activities: obtaining history, performing examination, entering orders, documenting, providing counseling and education, independently reviewing study/studies, communicating results, and coordinating care.       I Brigid PERDOMO, am scribing for and in the presence of CONOR Blanco     I, Woodrow Irwin MD, personally performed the services described in this documentation as scribed by CONOR Melchor in my presence, and it is both accurate and complete.    5/11/2022 12:19 PM

## 2022-05-16 ENCOUNTER — RX ONLY (RX ONLY)
Age: 72
End: 2022-05-16

## 2022-05-16 ENCOUNTER — DOCTOR'S OFFICE (OUTPATIENT)
Dept: URBAN - NONMETROPOLITAN AREA CLINIC 1 | Facility: CLINIC | Age: 72
Setting detail: OPHTHALMOLOGY
End: 2022-05-16
Payer: COMMERCIAL

## 2022-05-16 VITALS — HEIGHT: 60 IN

## 2022-05-16 DIAGNOSIS — E11.3293: ICD-10-CM

## 2022-05-16 DIAGNOSIS — H53.2: ICD-10-CM

## 2022-05-16 PROCEDURE — 92134 CPTRZ OPH DX IMG PST SGM RTA: CPT | Performed by: OPHTHALMOLOGY

## 2022-05-16 PROCEDURE — 99213 OFFICE O/P EST LOW 20 MIN: CPT | Performed by: OPHTHALMOLOGY

## 2022-05-16 ASSESSMENT — VISUAL ACUITY
OD_BCVA: 20/25-1
OS_BCVA: 20/30-2

## 2022-05-16 ASSESSMENT — CONFRONTATIONAL VISUAL FIELD TEST (CVF)
OS_FINDINGS: FULL
OD_FINDINGS: FULL

## 2022-05-19 ENCOUNTER — DOCTOR'S OFFICE (OUTPATIENT)
Dept: URBAN - NONMETROPOLITAN AREA CLINIC 1 | Facility: CLINIC | Age: 72
Setting detail: OPHTHALMOLOGY
End: 2022-05-19
Payer: COMMERCIAL

## 2022-05-19 DIAGNOSIS — E11.3293: ICD-10-CM

## 2022-05-19 DIAGNOSIS — H25.13: ICD-10-CM

## 2022-05-19 DIAGNOSIS — Z79.84: ICD-10-CM

## 2022-05-19 DIAGNOSIS — H43.813: ICD-10-CM

## 2022-05-19 DIAGNOSIS — H53.2: ICD-10-CM

## 2022-05-19 PROCEDURE — 92060 SENSORIMOTOR EXAMINATION: CPT | Performed by: OPHTHALMOLOGY

## 2022-05-19 PROCEDURE — 99213 OFFICE O/P EST LOW 20 MIN: CPT | Performed by: OPHTHALMOLOGY

## 2022-05-19 ASSESSMENT — CONFRONTATIONAL VISUAL FIELD TEST (CVF)
OD_FINDINGS: FULL
OS_FINDINGS: FULL

## 2022-05-19 ASSESSMENT — REFRACTION_AUTOREFRACTION
OD_CYLINDER: +3.00
OS_CYLINDER: +1.25
OS_SPHERE: -0.50
OD_SPHERE: -1.00
OD_AXIS: 013
OS_AXIS: 006

## 2022-05-19 ASSESSMENT — LID EXAM ASSESSMENTS
OD_BLEPHARITIS: 3+
OS_BLEPHARITIS: 3+

## 2022-05-19 ASSESSMENT — VISUAL ACUITY
OS_BCVA: 20/30-2
OD_BCVA: 20/25-2

## 2022-05-19 ASSESSMENT — SPHEQUIV_DERIVED
OS_SPHEQUIV: 0.125
OD_SPHEQUIV: 0.5

## 2022-06-13 ENCOUNTER — DOCTOR'S OFFICE (OUTPATIENT)
Dept: URBAN - NONMETROPOLITAN AREA CLINIC 1 | Facility: CLINIC | Age: 72
Setting detail: OPHTHALMOLOGY
End: 2022-06-13
Payer: COMMERCIAL

## 2022-06-13 DIAGNOSIS — H25.13: ICD-10-CM

## 2022-06-13 DIAGNOSIS — E11.3293: ICD-10-CM

## 2022-06-13 PROCEDURE — 99213 OFFICE O/P EST LOW 20 MIN: CPT | Performed by: OPHTHALMOLOGY

## 2022-06-13 PROCEDURE — 92235 FLUORESCEIN ANGRPH MLTIFRAME: CPT | Performed by: OPHTHALMOLOGY

## 2022-06-13 PROCEDURE — 92250 FUNDUS PHOTOGRAPHY W/I&R: CPT | Performed by: OPHTHALMOLOGY

## 2022-06-13 ASSESSMENT — REFRACTION_AUTOREFRACTION
OD_SPHERE: -1.00
OS_SPHERE: -0.50
OS_AXIS: 006
OD_CYLINDER: +3.00
OS_CYLINDER: +1.25
OD_AXIS: 013

## 2022-06-13 ASSESSMENT — LID EXAM ASSESSMENTS
OD_BLEPHARITIS: 3+
OS_BLEPHARITIS: 3+

## 2022-06-13 ASSESSMENT — VISUAL ACUITY
OD_BCVA: 20/30
OS_BCVA: 20/40+2

## 2022-06-13 ASSESSMENT — SPHEQUIV_DERIVED
OD_SPHEQUIV: 0.5
OS_SPHEQUIV: 0.125

## 2022-06-13 ASSESSMENT — CONFRONTATIONAL VISUAL FIELD TEST (CVF)
OS_FINDINGS: FULL
OD_FINDINGS: FULL

## 2022-07-07 ENCOUNTER — OFFICE VISIT (OUTPATIENT)
Dept: CARDIOLOGY | Facility: CLINIC | Age: 72
End: 2022-07-07
Payer: COMMERCIAL

## 2022-07-07 VITALS
WEIGHT: 262 LBS | HEART RATE: 74 BPM | BODY MASS INDEX: 39.71 KG/M2 | OXYGEN SATURATION: 97 % | SYSTOLIC BLOOD PRESSURE: 122 MMHG | DIASTOLIC BLOOD PRESSURE: 78 MMHG | HEIGHT: 68 IN

## 2022-07-07 DIAGNOSIS — E11.40 TYPE 2 DIABETES MELLITUS WITH DIABETIC NEUROPATHY, WITHOUT LONG-TERM CURRENT USE OF INSULIN (CMS/HCC): ICD-10-CM

## 2022-07-07 DIAGNOSIS — R06.83 SNORES: ICD-10-CM

## 2022-07-07 DIAGNOSIS — Z98.890 S/P CARDIAC CATH: ICD-10-CM

## 2022-07-07 DIAGNOSIS — I35.0 NONRHEUMATIC AORTIC VALVE STENOSIS: ICD-10-CM

## 2022-07-07 DIAGNOSIS — Z95.2 S/P TAVR (TRANSCATHETER AORTIC VALVE REPLACEMENT): Primary | ICD-10-CM

## 2022-07-07 DIAGNOSIS — I25.10 CORONARY ARTERY DISEASE DUE TO CALCIFIED CORONARY LESION: ICD-10-CM

## 2022-07-07 DIAGNOSIS — E66.01 MORBID OBESITY (CMS/HCC): ICD-10-CM

## 2022-07-07 DIAGNOSIS — E78.00 PURE HYPERCHOLESTEROLEMIA: ICD-10-CM

## 2022-07-07 DIAGNOSIS — I25.84 CORONARY ARTERY DISEASE DUE TO CALCIFIED CORONARY LESION: ICD-10-CM

## 2022-07-07 DIAGNOSIS — I48.21 PERMANENT ATRIAL FIBRILLATION (CMS/HCC): ICD-10-CM

## 2022-07-07 DIAGNOSIS — I10 ESSENTIAL HYPERTENSION: ICD-10-CM

## 2022-07-07 PROCEDURE — 3078F DIAST BP <80 MM HG: CPT | Performed by: INTERNAL MEDICINE

## 2022-07-07 PROCEDURE — 3074F SYST BP LT 130 MM HG: CPT | Performed by: INTERNAL MEDICINE

## 2022-07-07 PROCEDURE — 3008F BODY MASS INDEX DOCD: CPT | Performed by: INTERNAL MEDICINE

## 2022-07-07 PROCEDURE — 99214 OFFICE O/P EST MOD 30 MIN: CPT | Performed by: INTERNAL MEDICINE

## 2022-07-07 PROCEDURE — 93000 ELECTROCARDIOGRAM COMPLETE: CPT | Performed by: INTERNAL MEDICINE

## 2022-07-07 NOTE — LETTER
July 7, 2022     Ángel Cristobal MD  [address] [id number]    Patient: Yared Hensley  YOB: 1950  Date of Visit: 7/7/2022      Dear Dr. Cristobal:    Thank you for referring Yared Hensley to me for evaluation. Below are my notes for this consultation.    If you have questions, please do not hesitate to call me. I look forward to following your patient along with you.         Sincerely,        Susanna Miller MD        CC: MD Angela Barry, Susanna GOMEZ MD  7/7/2022 10:25 PM  Signed       Susanna Miller MD       Reason for visit : Follow up  HPI   Yared Hensley is a 71 y.o. male who presents to the office for cardiovascular follow up.      Dear Ángel,    On July 7, 2022 I saw Yared Hensley in the office in follow-up.  As you know in April he had a transaortic valve replacement with a #29 sapiens 3 valve.  Immediately postop he developed a new left bundle branch block which then normalized.  During his first visit with me he was found to be in atrial fibrillation and was completely asymptomatic.  He has hypertension, diabetes, hyperlipidemia and BPH.    At the time of his catheterization he was found to have a 60% tubular LAD lesion with a negative IFR.  He had an ejection fraction of 65% with evidence of diastolic dysfunction.  When I saw him in April he had a new right bundle branch block.    He is close to completing his sessions of cardiac rehab.  He tells me when he is there he is feeling well.  On May 12 for approximately 1 minute he had an episode of diplopia.  He is scheduled to see neurology for this.  His carotid studies done prior to his TAVR showed only mild disease.  He has been systemically anticoagulated for many months.    He does not notice much of a difference in the way of his shortness of breath.  He has still not had a sleep study.  I recommended this several times and would be surprised if he does not have this issue.    He denies chest pain,  lightheadedness, palpitations, peripheral edema, or syncope.  He tells me that at home his blood pressures have occasionally been quite high and he has been self adjusting his medications.    I have reviewed his medications, allergies, family history, social history, medical history and surgical history.  None of this is changed other than the above-mentioned.         Problem List:  2022-04: S/P TAVR (transcatheter aortic valve replacement)  2022-04: Snores  2022-04: Coronary artery disease due to calcified coronary lesion  2022-04: S/P cardiac cath  2022-03: Severe aortic stenosis  2022-03: CHF (congestive heart failure), NYHA class III, acute on   chronic, diastolic (CMS/[facility])  2022-02: Nonrheumatic aortic valve stenosis  2022-01: Atrial fibrillation (CMS/[facility])  2017-12: Pure hypercholesterolemia  2017-12: Morbid obesity (CMS/[facility])  2017-12: Benign prostatic hyperplasia with nocturia  2017-03: Type 2 diabetes mellitus with diabetic neuropathy,   unspecified (CMS/[facility])  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   • amLODIPine (NORVASC) 10 mg tablet Take 10 mg by mouth 2 (two) times a day.   • apixaban (ELIQUIS) 5 mg tablet Take 1 tablet (5 mg total) by mouth 2 (two) times a day.   • aspirin 81 mg enteric coated tablet Take 1 tablet (81 mg total) by mouth daily.   • glipiZIDE (GLUCOTROL) 5 mg tablet 10 mg 2 (two) times a day with meals.   • hydrochlorothiazide (MICROZIDE) 12.5 mg capsule Take 12.5 mg by mouth daily.   • JANUVIA 100 mg tablet Take 100 mg by mouth daily.   • lisinopriL (PRINIVIL) 40 mg tablet Take 40 mg by mouth 2 (two) times a day.   • metFORMIN (GLUCOPHAGE) 500 mg tablet 1,000 mg 2 (two) times a day with meals.     • metoprolol succinate XL (TOPROL-XL) 100 mg 24 hr tablet Take 1 tablet (100 mg total) by mouth daily.   • ONETOUCH ULTRA TEST  "strip    • REPATHA SYRINGE 140 mg/mL syringe syringe Inject 140 mg under the skin.   • spironolactone (ALDACTONE) 25 mg tablet Take 25 mg by mouth daily.   • tamsulosin (FLOMAX) 0.4 mg capsule Take 0.4 mg by mouth daily.   • vit A/vit C/vit E/zinc/copper (PRESERVISION AREDS ORAL) Take by mouth 2 times daily.   • vitamin E 200 unit capsule Take 200 Units by mouth daily.   • ZINC ORAL Take 50 mg by mouth.     No current facility-administered medications for this visit.          Allergies:  Patient has no known allergies.    Surgical History:  Past Surgical History:   Procedure Laterality Date   • AORTIC VALVE REPLACEMENT     • COLONOSCOPY     • SKIN BIOPSY      excision of basal cell ca on scalp        Family History:  Family History   Problem Relation Age of Onset   • Hypertension Biological Mother    • Heart disease Biological Father    • Hypertension Biological Father         Social History:  Social History     Socioeconomic History   • Marital status:      Spouse name: None   • Number of children: 3   • Years of education: None   • Highest education level: None   Occupational History   • Occupation: radiation oncologist   Tobacco Use   • Smoking status: Never Smoker   • Smokeless tobacco: Never Used   Vaping Use   • Vaping Use: Never used   Substance and Sexual Activity   • Alcohol use: Yes     Alcohol/week: 2.0 standard drinks     Types: 2 Glasses of wine per week     Comment: rarely   • Drug use: Never   • Sexual activity: Defer   Social History Narrative    Lives at home with wife and 3 daughters, two story home           Review of Systems    The rest of his review of systems is completely negative.       Objective   Vitals:    07/07/22 1513   BP: 122/78   BP Location: Left upper arm   Patient Position: Sitting   Pulse: 74   SpO2: 97%   Weight: 119 kg (262 lb)   Height: 1.727 m (5' 8\")     Physical Exam  Blood pressure is 122/78.  Heart rate is 74 and irregular.  He is comfortable.  Skin is warm and dry. "  Conjunctiva are pink.  Affect is appropriate.  No JVD or HJR.  Carotids are unremarkable.  Chest is clear.  Irregular rhythm with distant heart tones.  S1 and S2 are normal.  He does have an early harsh systolic ejection murmur at the aortic area and left sternal border.  I heard no diastolic sounds.  His abdomen is markedly obese and soft with good bowel sounds.  No obvious organomegaly.  No clubbing, cyanosis, or edema.  Several varicosities.  I felt no distal pulses.  No rash.  No obvious musculoskeletal deformity.       Labs:   Lab Results   Component Value Date    WBC 8.04 03/31/2022    HGB 14.2 03/31/2022    HCT 41.2 03/31/2022     03/31/2022    ALT 27 03/22/2022    AST 22 03/22/2022     03/31/2022    K 3.8 03/31/2022     03/31/2022    CREATININE 0.9 03/31/2022    BUN 18 03/31/2022    CO2 22 03/31/2022    INR 1.1 03/30/2022    HGBA1C 5.3 03/22/2022       ECG :  Today's EKG reveals atrial fibrillation that is controlled.  There was no longer right bundle branch block and he has nonspecific ST-T wave abnormalities.    Cardiac Imaging    TRANSTHORACIC ECHO (TTE) COMPLETE 05/10/2022    Interpretation Summary  Technically difficult study.  During the study the patient was in atrial fibrillation with controlled ventricular response  1. Small left ventricular cavity size with normal wall thickness. Normal systolic function. Visually estimated LVEF 70 percent . No regional wall motion abnormalities. Unable to evaluate diastolic function.  2. The patient is s/p # 29 Noel 3 TAVR 3/30/2022. There is no transvalvular regurgitation. There is trace poorly localized but posterior paravalvular regurgitation. Average peak velocity 2.80 (apex) m/s, mean gradient 20.0 mmHg, calculated aortic valve area 2.4 cm2 (LVOT 2.5 cm), dimensionless index 0.50.  3. Dilated sized aortic root 4.2 cm. Top-normal ascending aorta 3.9 cm.  4. Moderate mitral annular calcification.  Mean gradient 3 mmHg across the mitral  valve at a heart rate of 70 bpm.  Mild mitral insufficiency.  5. Severely dilated left atrium.  6. Normal right ventricular size with preserved systolic function.  7. Normal sized right atrium.  8. Structurally normal tricuspid valve. Trace tricuspid regurgitation. Estimated RVSP 41 mmHg.  9. Structurally normal pulmonic valve. There is trace pulmonic regurgitation.  10. Dilated IVC with >50% respiratory variation consistent with normal right sided filling pressures.  11. No pericardial effusion. Prominent epicardial fat pad.  12. Compared to previous TTE 3/31/2022, trace paravalvular regurgitation is visualized. No other significant change.             Problem List Items Addressed This Visit        Nervous    Type 2 diabetes mellitus with diabetic neuropathy, unspecified (CMS/HCC)       Respiratory    Snores       Circulatory    Essential hypertension    Atrial fibrillation (CMS/HCC)    Nonrheumatic aortic valve stenosis    S/P TAVR (transcatheter aortic valve replacement) - Primary    Relevant Orders    ECG 12 LEAD-OFFICE PERFORMED (Completed)    Coronary artery disease due to calcified coronary lesion       Endocrine/Metabolic    Pure hypercholesterolemia    Relevant Orders    Lipid panel    Morbid obesity (CMS/[facility])       Other    S/P cardiac cath          Impression:    Today Jonathan blood pressure is quite normal.  I am certainly somewhat concerned that the blood pressure cuff he is using is not accurate.  I told him to take his blood pressures with his cuffs from his office and to report to me with an update in the next week or so.    I do feel strongly that a sleep study should be done and he should be treated aggressively for sleep apnea if he should have this diagnosis.    Lipids and LFTs are pending.  His LDL should be 70 or less.  Yared does understand that he does have nonobstructive coronary disease.    His atrial fibrillation is controlled and completely asymptomatic.    It is unclear to me as to what  caused his diplopia.  He will be seeing his ophthalmologist as well as his neurologist in the near future.  For now he will stay completely anticoagulated.  He is also on aspirin.    I am happy that his right bundle branch block has resolved.    If all goes well I will see him again in 3 months for reevaluation unless he should need me sooner.    I thank you for allowing me to participate in the care of your patient.  If I can furnish you with any further details, please do not hesitate to contact me.     Susanna Miller MD  7/7/2022    This document was generated utilizing voice recognition technology. A reasonable attempt at proofreading has been made to minimize errors but please excuse any typographical errors which may be present. Please call with any questions.

## 2022-07-08 NOTE — PROGRESS NOTES
Susanna Miller MD       Reason for visit : Follow up  HPI   Yared Hensley is a 71 y.o. male who presents to the office for cardiovascular follow up.      Dear Ángel,    On July 7, 2022 I saw Yared Hensley in the office in follow-up.  As you know in April he had a transaortic valve replacement with a #29 sapiens 3 valve.  Immediately postop he developed a new left bundle branch block which then normalized.  During his first visit with me he was found to be in atrial fibrillation and was completely asymptomatic.  He has hypertension, diabetes, hyperlipidemia and BPH.    At the time of his catheterization he was found to have a 60% tubular LAD lesion with a negative IFR.  He had an ejection fraction of 65% with evidence of diastolic dysfunction.  When I saw him in April he had a new right bundle branch block.    He is close to completing his sessions of cardiac rehab.  He tells me when he is there he is feeling well.  On May 12 for approximately 1 minute he had an episode of diplopia.  He is scheduled to see neurology for this.  His carotid studies done prior to his TAVR showed only mild disease.  He has been systemically anticoagulated for many months.    He does not notice much of a difference in the way of his shortness of breath.  He has still not had a sleep study.  I recommended this several times and would be surprised if he does not have this issue.    He denies chest pain, lightheadedness, palpitations, peripheral edema, or syncope.  He tells me that at home his blood pressures have occasionally been quite high and he has been self adjusting his medications.    I have reviewed his medications, allergies, family history, social history, medical history and surgical history.  None of this is changed other than the above-mentioned.         Problem List:  2022-04: S/P TAVR (transcatheter aortic valve replacement)  2022-04: Snores  2022-04: Coronary artery disease due to calcified coronary  lesion  2022-04: S/P cardiac cath  2022-03: Severe aortic stenosis  2022-03: CHF (congestive heart failure), NYHA class III, acute on   chronic, diastolic (CMS/HCC)  2022-02: Nonrheumatic aortic valve stenosis  2022-01: Atrial fibrillation (CMS/HCC)  2017-12: Pure hypercholesterolemia  2017-12: Morbid obesity (CMS/HCC)  2017-12: Benign prostatic hyperplasia with nocturia  2017-03: Type 2 diabetes mellitus with diabetic neuropathy,   unspecified (CMS/HCC)  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   • amLODIPine (NORVASC) 10 mg tablet Take 10 mg by mouth 2 (two) times a day.   • apixaban (ELIQUIS) 5 mg tablet Take 1 tablet (5 mg total) by mouth 2 (two) times a day.   • aspirin 81 mg enteric coated tablet Take 1 tablet (81 mg total) by mouth daily.   • glipiZIDE (GLUCOTROL) 5 mg tablet 10 mg 2 (two) times a day with meals.   • hydrochlorothiazide (MICROZIDE) 12.5 mg capsule Take 12.5 mg by mouth daily.   • JANUVIA 100 mg tablet Take 100 mg by mouth daily.   • lisinopriL (PRINIVIL) 40 mg tablet Take 40 mg by mouth 2 (two) times a day.   • metFORMIN (GLUCOPHAGE) 500 mg tablet 1,000 mg 2 (two) times a day with meals.     • metoprolol succinate XL (TOPROL-XL) 100 mg 24 hr tablet Take 1 tablet (100 mg total) by mouth daily.   • ONETOUCH ULTRA TEST strip    • REPATHA SYRINGE 140 mg/mL syringe syringe Inject 140 mg under the skin.   • spironolactone (ALDACTONE) 25 mg tablet Take 25 mg by mouth daily.   • tamsulosin (FLOMAX) 0.4 mg capsule Take 0.4 mg by mouth daily.   • vit A/vit C/vit E/zinc/copper (PRESERVISION AREDS ORAL) Take by mouth 2 times daily.   • vitamin E 200 unit capsule Take 200 Units by mouth daily.   • ZINC ORAL Take 50 mg by mouth.     No current facility-administered medications for this visit.          Allergies:  Patient has no known  "allergies.    Surgical History:  Past Surgical History:   Procedure Laterality Date   • AORTIC VALVE REPLACEMENT     • COLONOSCOPY     • SKIN BIOPSY      excision of basal cell ca on scalp        Family History:  Family History   Problem Relation Age of Onset   • Hypertension Biological Mother    • Heart disease Biological Father    • Hypertension Biological Father         Social History:  Social History     Socioeconomic History   • Marital status:      Spouse name: None   • Number of children: 3   • Years of education: None   • Highest education level: None   Occupational History   • Occupation: radiation oncologist   Tobacco Use   • Smoking status: Never Smoker   • Smokeless tobacco: Never Used   Vaping Use   • Vaping Use: Never used   Substance and Sexual Activity   • Alcohol use: Yes     Alcohol/week: 2.0 standard drinks     Types: 2 Glasses of wine per week     Comment: rarely   • Drug use: Never   • Sexual activity: Defer   Social History Narrative    Lives at home with wife and 3 daughters, two story home           Review of Systems    The rest of his review of systems is completely negative.       Objective   Vitals:    07/07/22 1513   BP: 122/78   BP Location: Left upper arm   Patient Position: Sitting   Pulse: 74   SpO2: 97%   Weight: 119 kg (262 lb)   Height: 1.727 m (5' 8\")     Physical Exam  Blood pressure is 122/78.  Heart rate is 74 and irregular.  He is comfortable.  Skin is warm and dry.  Conjunctiva are pink.  Affect is appropriate.  No JVD or HJR.  Carotids are unremarkable.  Chest is clear.  Irregular rhythm with distant heart tones.  S1 and S2 are normal.  He does have an early harsh systolic ejection murmur at the aortic area and left sternal border.  I heard no diastolic sounds.  His abdomen is markedly obese and soft with good bowel sounds.  No obvious organomegaly.  No clubbing, cyanosis, or edema.  Several varicosities.  I felt no distal pulses.  No rash.  No obvious musculoskeletal " deformity.       Labs:   Lab Results   Component Value Date    WBC 8.04 03/31/2022    HGB 14.2 03/31/2022    HCT 41.2 03/31/2022     03/31/2022    ALT 27 03/22/2022    AST 22 03/22/2022     03/31/2022    K 3.8 03/31/2022     03/31/2022    CREATININE 0.9 03/31/2022    BUN 18 03/31/2022    CO2 22 03/31/2022    INR 1.1 03/30/2022    HGBA1C 5.3 03/22/2022       ECG :  Today's EKG reveals atrial fibrillation that is controlled.  There was no longer right bundle branch block and he has nonspecific ST-T wave abnormalities.    Cardiac Imaging    TRANSTHORACIC ECHO (TTE) COMPLETE 05/10/2022    Interpretation Summary  Technically difficult study.  During the study the patient was in atrial fibrillation with controlled ventricular response  1. Small left ventricular cavity size with normal wall thickness. Normal systolic function. Visually estimated LVEF 70 percent . No regional wall motion abnormalities. Unable to evaluate diastolic function.  2. The patient is s/p # 29 Noel 3 TAVR 3/30/2022. There is no transvalvular regurgitation. There is trace poorly localized but posterior paravalvular regurgitation. Average peak velocity 2.80 (apex) m/s, mean gradient 20.0 mmHg, calculated aortic valve area 2.4 cm2 (LVOT 2.5 cm), dimensionless index 0.50.  3. Dilated sized aortic root 4.2 cm. Top-normal ascending aorta 3.9 cm.  4. Moderate mitral annular calcification.  Mean gradient 3 mmHg across the mitral valve at a heart rate of 70 bpm.  Mild mitral insufficiency.  5. Severely dilated left atrium.  6. Normal right ventricular size with preserved systolic function.  7. Normal sized right atrium.  8. Structurally normal tricuspid valve. Trace tricuspid regurgitation. Estimated RVSP 41 mmHg.  9. Structurally normal pulmonic valve. There is trace pulmonic regurgitation.  10. Dilated IVC with >50% respiratory variation consistent with normal right sided filling pressures.  11. No pericardial effusion. Prominent  epicardial fat pad.  12. Compared to previous TTE 3/31/2022, trace paravalvular regurgitation is visualized. No other significant change.             Problem List Items Addressed This Visit        Nervous    Type 2 diabetes mellitus with diabetic neuropathy, unspecified (CMS/HCC)       Respiratory    Snores       Circulatory    Essential hypertension    Atrial fibrillation (CMS/HCC)    Nonrheumatic aortic valve stenosis    S/P TAVR (transcatheter aortic valve replacement) - Primary    Relevant Orders    ECG 12 LEAD-OFFICE PERFORMED (Completed)    Coronary artery disease due to calcified coronary lesion       Endocrine/Metabolic    Pure hypercholesterolemia    Relevant Orders    Lipid panel    Morbid obesity (CMS/HCC)       Other    S/P cardiac cath          Impression:    Today Jonathan blood pressure is quite normal.  I am certainly somewhat concerned that the blood pressure cuff he is using is not accurate.  I told him to take his blood pressures with his cuffs from his office and to report to me with an update in the next week or so.    I do feel strongly that a sleep study should be done and he should be treated aggressively for sleep apnea if he should have this diagnosis.    Lipids and LFTs are pending.  His LDL should be 70 or less.  Yared does understand that he does have nonobstructive coronary disease.    His atrial fibrillation is controlled and completely asymptomatic.    It is unclear to me as to what caused his diplopia.  He will be seeing his ophthalmologist as well as his neurologist in the near future.  For now he will stay completely anticoagulated.  He is also on aspirin.    I am happy that his right bundle branch block has resolved.    If all goes well I will see him again in 3 months for reevaluation unless he should need me sooner.    I thank you for allowing me to participate in the care of your patient.  If I can furnish you with any further details, please do not hesitate to contact me.      Susanna Miller MD  7/7/2022    This document was generated utilizing voice recognition technology. A reasonable attempt at proofreading has been made to minimize errors but please excuse any typographical errors which may be present. Please call with any questions.

## 2022-08-01 ENCOUNTER — DOCTOR'S OFFICE (OUTPATIENT)
Dept: URBAN - NONMETROPOLITAN AREA CLINIC 1 | Facility: CLINIC | Age: 72
Setting detail: OPHTHALMOLOGY
End: 2022-08-01
Payer: COMMERCIAL

## 2022-08-01 DIAGNOSIS — E11.3293: ICD-10-CM

## 2022-08-01 DIAGNOSIS — H43.813: ICD-10-CM

## 2022-08-01 DIAGNOSIS — H35.413: ICD-10-CM

## 2022-08-01 PROCEDURE — 99213 OFFICE O/P EST LOW 20 MIN: CPT | Performed by: OPHTHALMOLOGY

## 2022-08-01 PROCEDURE — 92134 CPTRZ OPH DX IMG PST SGM RTA: CPT | Performed by: OPHTHALMOLOGY

## 2022-08-01 ASSESSMENT — SPHEQUIV_DERIVED
OD_SPHEQUIV: 0.5
OS_SPHEQUIV: 0.125

## 2022-08-01 ASSESSMENT — VISUAL ACUITY
OS_BCVA: 20/20-2
OD_BCVA: 20/20

## 2022-08-01 ASSESSMENT — REFRACTION_AUTOREFRACTION
OD_SPHERE: -1.00
OS_SPHERE: -0.50
OS_AXIS: 006
OD_CYLINDER: +3.00
OS_CYLINDER: +1.25
OD_AXIS: 013

## 2022-08-01 ASSESSMENT — LID EXAM ASSESSMENTS
OD_BLEPHARITIS: 3+
OS_BLEPHARITIS: 3+

## 2022-08-01 ASSESSMENT — CONFRONTATIONAL VISUAL FIELD TEST (CVF)
OS_FINDINGS: FULL
OD_FINDINGS: FULL

## 2022-08-23 ENCOUNTER — TELEPHONE (OUTPATIENT)
Dept: CARDIOLOGY | Facility: CLINIC | Age: 72
End: 2022-08-23
Payer: COMMERCIAL

## 2022-08-23 DIAGNOSIS — I10 ESSENTIAL HYPERTENSION: Primary | ICD-10-CM

## 2022-08-23 RX ORDER — METOPROLOL SUCCINATE 50 MG/1
50 TABLET, EXTENDED RELEASE ORAL DAILY
COMMUNITY
End: 2023-05-05 | Stop reason: SDUPTHER

## 2022-08-23 RX ORDER — HYDRALAZINE HYDROCHLORIDE 100 MG/1
100 TABLET, FILM COATED ORAL 3 TIMES DAILY
Qty: 270 TABLET | Refills: 1 | Status: SHIPPED | OUTPATIENT
Start: 2022-08-23 | End: 2023-02-26

## 2022-08-23 NOTE — TELEPHONE ENCOUNTER
On August 23, 2022 I spoke with Yared Hensley about an MRI that I recently received.  There is a new subacute lacunar infarcts noted.  He tells me that about a month or so ago he developed diplopia and ultimately saw his neurologist to ordered an MRI.    He tells me his blood pressures have been running on the high side.  I have increased his hydralazine to 100 mg 3 times a day and have also asked him to please get a sleep study done.

## 2022-08-29 ENCOUNTER — DOCTOR'S OFFICE (OUTPATIENT)
Dept: URBAN - NONMETROPOLITAN AREA CLINIC 1 | Facility: CLINIC | Age: 72
Setting detail: OPHTHALMOLOGY
End: 2022-08-29
Payer: COMMERCIAL

## 2022-08-29 VITALS — HEIGHT: 60 IN

## 2022-08-29 DIAGNOSIS — H53.2: ICD-10-CM

## 2022-08-29 PROBLEM — H35.413 LATTICE DEGENERATION; BOTH EYES: Status: ACTIVE | Noted: 2017-07-31

## 2022-08-29 PROBLEM — H25.13 CATARACT NUCLEAR SCLEROSIS AGE RELATED; BOTH EYES: Status: ACTIVE | Noted: 2017-07-31

## 2022-08-29 PROCEDURE — 92083 EXTENDED VISUAL FIELD XM: CPT | Performed by: OPHTHALMOLOGY

## 2022-08-29 PROCEDURE — 99213 OFFICE O/P EST LOW 20 MIN: CPT | Performed by: OPHTHALMOLOGY

## 2022-08-29 ASSESSMENT — CONFRONTATIONAL VISUAL FIELD TEST (CVF)
OD_FINDINGS: FULL
OS_FINDINGS: FULL

## 2022-08-29 ASSESSMENT — KERATOMETRY
OD_AXISANGLE_DEGREES: 073
OD_K2POWER_DIOPTERS: 42.25
OS_AXISANGLE_DEGREES: 089
OS_K2POWER_DIOPTERS: 42.75
OS_K1POWER_DIOPTERS: 42.25
OD_K1POWER_DIOPTERS: 42.00

## 2022-08-29 ASSESSMENT — REFRACTION_AUTOREFRACTION
OD_SPHERE: +1.00
OD_CYLINDER: -1.75
OS_CYLINDER: -1.00
OS_AXIS: 095
OD_AXIS: 106
OS_SPHERE: +0.50

## 2022-08-29 ASSESSMENT — VISUAL ACUITY
OD_BCVA: 20/20
OS_BCVA: 20/20-1

## 2022-08-29 ASSESSMENT — REFRACTION_CURRENTRX
OD_SPHERE: PLANO
OD_VPRISM_DIRECTION: SV
OS_SPHERE: PLANO
OS_CYLINDER: -1.00
OD_OVR_VA: 20/
OD_AXIS: 088
OD_CYLINDER: -1.00
OS_OVR_VA: 20/
OS_VPRISM_DIRECTION: SV
OS_AXIS: 086

## 2022-08-29 ASSESSMENT — LID EXAM ASSESSMENTS
OS_BLEPHARITIS: 3+
OD_BLEPHARITIS: 3+

## 2022-08-29 ASSESSMENT — AXIALLENGTH_DERIVED
OD_AL: 24.0573
OS_AL: 23.965

## 2022-08-29 ASSESSMENT — SPHEQUIV_DERIVED
OS_SPHEQUIV: 0
OD_SPHEQUIV: 0.125

## 2022-10-06 ENCOUNTER — OFFICE VISIT (OUTPATIENT)
Dept: CARDIOLOGY | Facility: CLINIC | Age: 72
End: 2022-10-06
Payer: COMMERCIAL

## 2022-10-06 VITALS
HEART RATE: 70 BPM | WEIGHT: 267 LBS | DIASTOLIC BLOOD PRESSURE: 78 MMHG | HEIGHT: 68 IN | RESPIRATION RATE: 16 BRPM | SYSTOLIC BLOOD PRESSURE: 142 MMHG | OXYGEN SATURATION: 97 % | BODY MASS INDEX: 40.47 KG/M2

## 2022-10-06 DIAGNOSIS — I10 ESSENTIAL HYPERTENSION: ICD-10-CM

## 2022-10-06 DIAGNOSIS — Z95.2 S/P TAVR (TRANSCATHETER AORTIC VALVE REPLACEMENT): ICD-10-CM

## 2022-10-06 DIAGNOSIS — I25.84 CORONARY ARTERY DISEASE DUE TO CALCIFIED CORONARY LESION: ICD-10-CM

## 2022-10-06 DIAGNOSIS — I25.10 CORONARY ARTERY DISEASE DUE TO CALCIFIED CORONARY LESION: ICD-10-CM

## 2022-10-06 DIAGNOSIS — E78.00 PURE HYPERCHOLESTEROLEMIA: ICD-10-CM

## 2022-10-06 DIAGNOSIS — I48.21 PERMANENT ATRIAL FIBRILLATION (CMS/HCC): ICD-10-CM

## 2022-10-06 DIAGNOSIS — R06.83 SNORES: ICD-10-CM

## 2022-10-06 DIAGNOSIS — E11.40 TYPE 2 DIABETES MELLITUS WITH DIABETIC NEUROPATHY, WITHOUT LONG-TERM CURRENT USE OF INSULIN (CMS/HCC): ICD-10-CM

## 2022-10-06 DIAGNOSIS — I10 PRIMARY HYPERTENSION: Primary | ICD-10-CM

## 2022-10-06 PROCEDURE — 3008F BODY MASS INDEX DOCD: CPT | Performed by: INTERNAL MEDICINE

## 2022-10-06 PROCEDURE — 99214 OFFICE O/P EST MOD 30 MIN: CPT | Performed by: INTERNAL MEDICINE

## 2022-10-06 PROCEDURE — 3077F SYST BP >= 140 MM HG: CPT | Performed by: INTERNAL MEDICINE

## 2022-10-06 PROCEDURE — 3078F DIAST BP <80 MM HG: CPT | Performed by: INTERNAL MEDICINE

## 2022-10-06 RX ORDER — CLONIDINE 0.1 MG/24H
1 PATCH, EXTENDED RELEASE TRANSDERMAL WEEKLY
Qty: 12 PATCH | Refills: 3 | Status: SHIPPED | OUTPATIENT
Start: 2022-10-06 | End: 2024-07-22

## 2022-10-06 NOTE — LETTER
October 6, 2022     Ángel Cristobal MD  [address] [facility] [address]    Patient: Yared Hensley  YOB: 1950  Date of Visit: 10/6/2022      Dear Dr. Cristobal:    Thank you for referring Yared Hensley to me for evaluation. Below are my notes for this consultation.    If you have questions, please do not hesitate to call me. I look forward to following your patient along with you.         Sincerely,        Susanna Miller MD        CC: No Recipients  Susanna Miller MD  10/6/2022  9:28 PM  Signed       Susanna Miller MD       Reason for visit : Follow up  HPI   Yared Hensley is a 71 y.o. male who presents to the office for cardiovascular follow up.      Dear Dr. Cristobal,    On October 6, 2022 I saw Yared Hensley in the office for his cardiovascular follow-up.  He is now 71 years old and as you know has a complicated history.  He has a longstanding history of hypertension, hyperlipidemia, diabetes and BPH.  He came to see me several months ago with symptoms of shortness of breath.  Clinically he had a significant component of aortic stenosis.  Ultimately he had a catheterization.  He had a 60% mid LAD lesion.  The right coronary artery and circumflex were patent.  He was found to have severe aortic stenosis and ultimately in April he had a transaortic valve replacement.    When I originally met him he was found to have atrial fibrillation which was completely asymptomatic.  His blood pressures have been difficult to control.  It has been my great concern that he has sleep apnea.  He recently had a home test that reportedly showed only mild sleep apnea.  I have a hard time believing this and it was my suggestion to Yared that he have an in-house sleep study.  He also developed diplopia several months ago which he tells me is much improved since he has new glasses.    He denies chest pain, shortness of breath, lightheadedness or syncope.  He is riding stationary bike twice a week for  approximately 20 minutes.    I reviewed his allergies, medications, social history, family history, medical history, and surgical history.  None of this is changed other than the above-mentioned.  The rest of his review of systems is completely negative.       Problem List:  2022-04: S/P TAVR (transcatheter aortic valve replacement)  2022-04: Snores  2022-04: Coronary artery disease due to calcified coronary lesion  2022-04: S/P cardiac cath  2022-03: Severe aortic stenosis  2022-03: CHF (congestive heart failure), NYHA class III, acute on   chronic, diastolic (CMS/[facility])  2022-02: Nonrheumatic aortic valve stenosis  2022-01: Atrial fibrillation (CMS/[facility])  2017-12: Pure hypercholesterolemia  2017-12: Morbid obesity (CMS/[facility])  2017-12: Benign prostatic hyperplasia with nocturia  2017-03: Type 2 diabetes mellitus with diabetic neuropathy,   unspecified (CMS/[facility])  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    amLODIPine (NORVASC) 10 mg tablet Take 10 mg by mouth 2 (two) times a day.    aspirin 81 mg enteric coated tablet Take 1 tablet (81 mg total) by mouth daily.    ELIQUIS 5 mg tablet TAKE 1 TABLET BY MOUTH TWICE A DAY    glipiZIDE (GLUCOTROL) 5 mg tablet 10 mg 2 (two) times a day with meals.    hydrALAZINE (APRESOLINE) 100 mg tablet Take 1 tablet (100 mg total) by mouth 3 (three) times a day.    hydrochlorothiazide (MICROZIDE) 12.5 mg capsule Take 12.5 mg by mouth daily.    JANUVIA 100 mg tablet Take 100 mg by mouth daily.    lisinopriL (PRINIVIL) 40 mg tablet Take 40 mg by mouth 2 (two) times a day.    metFORMIN (GLUCOPHAGE) 500 mg tablet 1,000 mg 2 (two) times a day with meals.      metoprolol succinate XL (TOPROL-XL) 100 mg 24 hr tablet Take 1 tablet (100 mg total) by mouth daily.    metoprolol succinate XL (TOPROL-XL) 50 mg 24 hr tablet  "Take 50 mg by mouth daily. Take in the p.m.    ONETOUCH ULTRA TEST strip     REPATHA SYRINGE 140 mg/mL syringe syringe Inject 140 mg under the skin.    spironolactone (ALDACTONE) 25 mg tablet Take 25 mg by mouth daily.    tamsulosin (FLOMAX) 0.4 mg capsule Take 0.4 mg by mouth daily.    vit A/vit C/vit E/zinc/copper (PRESERVISION AREDS ORAL) Take by mouth 2 times daily.    vitamin E 200 unit capsule Take 200 Units by mouth daily.    ZINC ORAL Take 50 mg by mouth.     No current facility-administered medications for this visit.          Allergies:  Patient has no known allergies.    Surgical History:  Past Surgical History:   Procedure Laterality Date    AORTIC VALVE REPLACEMENT      COLONOSCOPY      SKIN BIOPSY      excision of basal cell ca on scalp        Family History:  Family History   Problem Relation Age of Onset    Hypertension Biological Mother     Heart disease Biological Father     Hypertension Biological Father         Social History:  Social History     Socioeconomic History    Marital status:      Spouse name: None    Number of children: 3    Years of education: None    Highest education level: None   Occupational History    Occupation: radiation oncologist   Tobacco Use    Smoking status: Never Smoker    Smokeless tobacco: Never Used   Vaping Use    Vaping Use: Never used   Substance and Sexual Activity    Alcohol use: Yes     Alcohol/week: 2.0 standard drinks     Types: 2 Glasses of wine per week     Comment: rarely    Drug use: Never    Sexual activity: Defer   Social History Narrative    Lives at home with wife and 3 daughters, two story home           Review of Systems  The rest of his review of systems is completely negative.          Objective   Vitals:    10/06/22 1514   BP: (!) 142/78   BP Location: Left upper arm   Patient Position: Sitting   Pulse: 70   Resp: 16   SpO2: 97%   Weight: 121 kg (267 lb)   Height: 1.727 m (5' 8\")     Physical Exam  Blood pressure is " 142/78.  Heart rate was 70 and irregular.  He is comfortable.  Skin is warm and dry.  Conjunctiva are pink.  Affect is appropriate.  No JVD or HJR.  Carotids are unremarkable.  Chest is clear.  Regular rhythm.  Normal S1 and S2.  He does have an early systolic murmur at the aortic area that does radiate to the left sternal border.  I heard no aortic insufficiency.  There were no gallops.  Heart tones were distant.  Abdomen is obese and soft with good bowel sounds.  No obvious organomegaly.  No clubbing, or cyanosis.  Minimal peripheral edema.  I felt no distal pulses.  No rash.  He did have some skin changes consistent with chronic venous stasis.       Labs:   Lab Results   Component Value Date    WBC 8.04 03/31/2022    HGB 14.2 03/31/2022    HCT 41.2 03/31/2022     03/31/2022    ALT 27 03/22/2022    AST 22 03/22/2022     03/31/2022    K 3.8 03/31/2022     03/31/2022    CREATININE 0.9 03/31/2022    BUN 18 03/31/2022    CO2 22 03/31/2022    INR 1.1 03/30/2022    HGBA1C 5.3 03/22/2022         Cardiac Imaging    TRANSTHORACIC ECHO (TTE) COMPLETE 05/10/2022    Interpretation Summary  Technically difficult study.  During the study the patient was in atrial fibrillation with controlled ventricular response  1. Small left ventricular cavity size with normal wall thickness. Normal systolic function. Visually estimated LVEF 70 percent . No regional wall motion abnormalities. Unable to evaluate diastolic function.  2. The patient is s/p # 29 Noel 3 TAVR 3/30/2022. There is no transvalvular regurgitation. There is trace poorly localized but posterior paravalvular regurgitation. Average peak velocity 2.80 (apex) m/s, mean gradient 20.0 mmHg, calculated aortic valve area 2.4 cm2 (LVOT 2.5 cm), dimensionless index 0.50.  3. Dilated sized aortic root 4.2 cm. Top-normal ascending aorta 3.9 cm.  4. Moderate mitral annular calcification.  Mean gradient 3 mmHg across the mitral valve at a heart rate of 70 bpm.   Mild mitral insufficiency.  5. Severely dilated left atrium.  6. Normal right ventricular size with preserved systolic function.  7. Normal sized right atrium.  8. Structurally normal tricuspid valve. Trace tricuspid regurgitation. Estimated RVSP 41 mmHg.  9. Structurally normal pulmonic valve. There is trace pulmonic regurgitation.  10. Dilated IVC with >50% respiratory variation consistent with normal right sided filling pressures.  11. No pericardial effusion. Prominent epicardial fat pad.  12. Compared to previous TTE 3/31/2022, trace paravalvular regurgitation is visualized. No other significant change.      STRESS TEST :       Problem List Items Addressed This Visit        Nervous    Type 2 diabetes mellitus with diabetic neuropathy, unspecified (CMS/[facility])       Respiratory    Snores       Circulatory    Essential hypertension    Atrial fibrillation (CMS/[facility])    S/P TAVR (transcatheter aortic valve replacement)    Coronary artery disease due to calcified coronary lesion       Other    Pure hypercholesterolemia      Other Visit Diagnoses     Primary hypertension    -  Primary          Impression:    In reviewing his blood pressures from home they are all consistently much too high.  He is really on every medication on demand.  I have added Catapres patch 0.1 every week.  And I do think it is reasonable to ensure that he does not have renal artery stenosis with a renal artery ultrasound.  He will call me in 2 weeks with an update on his blood pressures and I will see him in January 2023 for reevaluation.  He tells me that you are following his lipids.  Ideally his LDL should be at 70 or less.  He has been intolerant of statins and is currently on Repatha.    I thank you for allowing me to participate in the care of your patient.  If I can furnish you with any further details, please do not hesitate to contact me.     Susanna Miller MD  10/6/2022    This document was generated utilizing voice recognition  technology. A reasonable attempt at proofreading has been made to minimize errors but please excuse any typographical errors which may be present. Please call with any questions.

## 2022-10-07 NOTE — PROGRESS NOTES
Susanna Miller MD       Reason for visit : Follow up  HPI   Yared Hensley is a 71 y.o. male who presents to the office for cardiovascular follow up.      Dear Dr. Crisotbal,    On October 6, 2022 I saw Yared Hensley in the office for his cardiovascular follow-up.  He is now 71 years old and as you know has a complicated history.  He has a longstanding history of hypertension, hyperlipidemia, diabetes and BPH.  He came to see me several months ago with symptoms of shortness of breath.  Clinically he had a significant component of aortic stenosis.  Ultimately he had a catheterization.  He had a 60% mid LAD lesion.  The right coronary artery and circumflex were patent.  He was found to have severe aortic stenosis and ultimately in April he had a transaortic valve replacement.    When I originally met him he was found to have atrial fibrillation which was completely asymptomatic.  His blood pressures have been difficult to control.  It has been my great concern that he has sleep apnea.  He recently had a home test that reportedly showed only mild sleep apnea.  I have a hard time believing this and it was my suggestion to Yared that he have an in-house sleep study.  He also developed diplopia several months ago which he tells me is much improved since he has new glasses.    He denies chest pain, shortness of breath, lightheadedness or syncope.  He is riding stationary bike twice a week for approximately 20 minutes.    I reviewed his allergies, medications, social history, family history, medical history, and surgical history.  None of this is changed other than the above-mentioned.  The rest of his review of systems is completely negative.       Problem List:  2022-04: S/P TAVR (transcatheter aortic valve replacement)  2022-04: Snores  2022-04: Coronary artery disease due to calcified coronary lesion  2022-04: S/P cardiac cath  2022-03: Severe aortic stenosis  2022-03: CHF (congestive heart failure), NYHA class  III, acute on   chronic, diastolic (CMS/[facility])  2022-02: Nonrheumatic aortic valve stenosis  2022-01: Atrial fibrillation (CMS/[facility])  2017-12: Pure hypercholesterolemia  2017-12: Morbid obesity (CMS/[facility])  2017-12: Benign prostatic hyperplasia with nocturia  2017-03: Type 2 diabetes mellitus with diabetic neuropathy,   unspecified (CMS/[facility])  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    amLODIPine (NORVASC) 10 mg tablet Take 10 mg by mouth 2 (two) times a day.    aspirin 81 mg enteric coated tablet Take 1 tablet (81 mg total) by mouth daily.    ELIQUIS 5 mg tablet TAKE 1 TABLET BY MOUTH TWICE A DAY    glipiZIDE (GLUCOTROL) 5 mg tablet 10 mg 2 (two) times a day with meals.    hydrALAZINE (APRESOLINE) 100 mg tablet Take 1 tablet (100 mg total) by mouth 3 (three) times a day.    hydrochlorothiazide (MICROZIDE) 12.5 mg capsule Take 12.5 mg by mouth daily.    JANUVIA 100 mg tablet Take 100 mg by mouth daily.    lisinopriL (PRINIVIL) 40 mg tablet Take 40 mg by mouth 2 (two) times a day.    metFORMIN (GLUCOPHAGE) 500 mg tablet 1,000 mg 2 (two) times a day with meals.      metoprolol succinate XL (TOPROL-XL) 100 mg 24 hr tablet Take 1 tablet (100 mg total) by mouth daily.    metoprolol succinate XL (TOPROL-XL) 50 mg 24 hr tablet Take 50 mg by mouth daily. Take in the p.m.    ONETOUCH ULTRA TEST strip     REPATHA SYRINGE 140 mg/mL syringe syringe Inject 140 mg under the skin.    spironolactone (ALDACTONE) 25 mg tablet Take 25 mg by mouth daily.    tamsulosin (FLOMAX) 0.4 mg capsule Take 0.4 mg by mouth daily.    vit A/vit C/vit E/zinc/copper (PRESERVISION AREDS ORAL) Take by mouth 2 times daily.    vitamin E 200 unit capsule Take 200 Units by mouth daily.    ZINC ORAL Take 50 mg by mouth.     No current facility-administered medications for this  "visit.          Allergies:  Patient has no known allergies.    Surgical History:  Past Surgical History:   Procedure Laterality Date    AORTIC VALVE REPLACEMENT      COLONOSCOPY      SKIN BIOPSY      excision of basal cell ca on scalp        Family History:  Family History   Problem Relation Age of Onset    Hypertension Biological Mother     Heart disease Biological Father     Hypertension Biological Father         Social History:  Social History     Socioeconomic History    Marital status:      Spouse name: None    Number of children: 3    Years of education: None    Highest education level: None   Occupational History    Occupation: radiation oncologist   Tobacco Use    Smoking status: Never Smoker    Smokeless tobacco: Never Used   Vaping Use    Vaping Use: Never used   Substance and Sexual Activity    Alcohol use: Yes     Alcohol/week: 2.0 standard drinks     Types: 2 Glasses of wine per week     Comment: rarely    Drug use: Never    Sexual activity: Defer   Social History Narrative    Lives at home with wife and 3 daughters, two story home           Review of Systems  The rest of his review of systems is completely negative.          Objective   Vitals:    10/06/22 1514   BP: (!) 142/78   BP Location: Left upper arm   Patient Position: Sitting   Pulse: 70   Resp: 16   SpO2: 97%   Weight: 121 kg (267 lb)   Height: 1.727 m (5' 8\")     Physical Exam  Blood pressure is 142/78.  Heart rate was 70 and irregular.  He is comfortable.  Skin is warm and dry.  Conjunctiva are pink.  Affect is appropriate.  No JVD or HJR.  Carotids are unremarkable.  Chest is clear.  Regular rhythm.  Normal S1 and S2.  He does have an early systolic murmur at the aortic area that does radiate to the left sternal border.  I heard no aortic insufficiency.  There were no gallops.  Heart tones were distant.  Abdomen is obese and soft with good bowel sounds.  No obvious organomegaly.  No clubbing, or cyanosis.  Minimal " peripheral edema.  I felt no distal pulses.  No rash.  He did have some skin changes consistent with chronic venous stasis.       Labs:   Lab Results   Component Value Date    WBC 8.04 03/31/2022    HGB 14.2 03/31/2022    HCT 41.2 03/31/2022     03/31/2022    ALT 27 03/22/2022    AST 22 03/22/2022     03/31/2022    K 3.8 03/31/2022     03/31/2022    CREATININE 0.9 03/31/2022    BUN 18 03/31/2022    CO2 22 03/31/2022    INR 1.1 03/30/2022    HGBA1C 5.3 03/22/2022         Cardiac Imaging    TRANSTHORACIC ECHO (TTE) COMPLETE 05/10/2022    Interpretation Summary  Technically difficult study.  During the study the patient was in atrial fibrillation with controlled ventricular response  1. Small left ventricular cavity size with normal wall thickness. Normal systolic function. Visually estimated LVEF 70 percent . No regional wall motion abnormalities. Unable to evaluate diastolic function.  2. The patient is s/p # 29 Noel 3 TAVR 3/30/2022. There is no transvalvular regurgitation. There is trace poorly localized but posterior paravalvular regurgitation. Average peak velocity 2.80 (apex) m/s, mean gradient 20.0 mmHg, calculated aortic valve area 2.4 cm2 (LVOT 2.5 cm), dimensionless index 0.50.  3. Dilated sized aortic root 4.2 cm. Top-normal ascending aorta 3.9 cm.  4. Moderate mitral annular calcification.  Mean gradient 3 mmHg across the mitral valve at a heart rate of 70 bpm.  Mild mitral insufficiency.  5. Severely dilated left atrium.  6. Normal right ventricular size with preserved systolic function.  7. Normal sized right atrium.  8. Structurally normal tricuspid valve. Trace tricuspid regurgitation. Estimated RVSP 41 mmHg.  9. Structurally normal pulmonic valve. There is trace pulmonic regurgitation.  10. Dilated IVC with >50% respiratory variation consistent with normal right sided filling pressures.  11. No pericardial effusion. Prominent epicardial fat pad.  12. Compared to previous TTE  3/31/2022, trace paravalvular regurgitation is visualized. No other significant change.      STRESS TEST :       Problem List Items Addressed This Visit        Nervous    Type 2 diabetes mellitus with diabetic neuropathy, unspecified (CMS/HCC)       Respiratory    Snores       Circulatory    Essential hypertension    Atrial fibrillation (CMS/HCC)    S/P TAVR (transcatheter aortic valve replacement)    Coronary artery disease due to calcified coronary lesion       Other    Pure hypercholesterolemia      Other Visit Diagnoses     Primary hypertension    -  Primary          Impression:    In reviewing his blood pressures from home they are all consistently much too high.  He is really on every medication on demand.  I have added Catapres patch 0.1 every week.  And I do think it is reasonable to ensure that he does not have renal artery stenosis with a renal artery ultrasound.  He will call me in 2 weeks with an update on his blood pressures and I will see him in January 2023 for reevaluation.  He tells me that you are following his lipids.  Ideally his LDL should be at 70 or less.  He has been intolerant of statins and is currently on Repatha.    I thank you for allowing me to participate in the care of your patient.  If I can furnish you with any further details, please do not hesitate to contact me.     Susanna Miller MD  10/6/2022    This document was generated utilizing voice recognition technology. A reasonable attempt at proofreading has been made to minimize errors but please excuse any typographical errors which may be present. Please call with any questions.

## 2022-10-28 ENCOUNTER — TELEPHONE (OUTPATIENT)
Dept: SCHEDULING | Facility: CLINIC | Age: 72
End: 2022-10-28
Payer: COMMERCIAL

## 2022-10-28 DIAGNOSIS — I35.0 NONRHEUMATIC AORTIC VALVE STENOSIS: Primary | ICD-10-CM

## 2022-10-28 RX ORDER — AMOXICILLIN 500 MG/1
CAPSULE ORAL
Qty: 3 CAPSULE | Refills: 0 | Status: SHIPPED | OUTPATIENT
Start: 2022-10-28 | End: 2023-05-12 | Stop reason: SDUPTHER

## 2022-10-28 NOTE — TELEPHONE ENCOUNTER
Alycia from Dr. Hensley's office called to reqst a prescription for Amoxicilline. Pt having a dental works done on monday afternoon    Dr. Hensley can be reached at [phone]

## 2022-11-07 ENCOUNTER — DOCTOR'S OFFICE (OUTPATIENT)
Dept: URBAN - NONMETROPOLITAN AREA CLINIC 1 | Facility: CLINIC | Age: 72
Setting detail: OPHTHALMOLOGY
End: 2022-11-07
Payer: COMMERCIAL

## 2022-11-07 DIAGNOSIS — H35.413: ICD-10-CM

## 2022-11-07 DIAGNOSIS — H43.813: ICD-10-CM

## 2022-11-07 DIAGNOSIS — E11.3293: ICD-10-CM

## 2022-11-07 PROCEDURE — 92134 CPTRZ OPH DX IMG PST SGM RTA: CPT | Performed by: OPHTHALMOLOGY

## 2022-11-07 PROCEDURE — 99213 OFFICE O/P EST LOW 20 MIN: CPT | Performed by: OPHTHALMOLOGY

## 2022-11-07 ASSESSMENT — VISUAL ACUITY
OS_BCVA: 20/25+2
OD_BCVA: 20/20

## 2022-11-07 ASSESSMENT — REFRACTION_AUTOREFRACTION
OD_SPHERE: +1.00
OS_AXIS: 095
OS_CYLINDER: -1.00
OD_AXIS: 106
OS_SPHERE: +0.50
OD_CYLINDER: -1.75

## 2022-11-07 ASSESSMENT — REFRACTION_CURRENTRX
OD_OVR_VA: 20/
OS_VPRISM_DIRECTION: SV
OD_AXIS: 088
OS_SPHERE: PLANO
OS_CYLINDER: -1.00
OD_VPRISM_DIRECTION: SV
OD_CYLINDER: -1.00
OD_SPHERE: PLANO
OS_AXIS: 086
OS_OVR_VA: 20/

## 2022-11-07 ASSESSMENT — KERATOMETRY
OD_K2POWER_DIOPTERS: 42.25
OD_K1POWER_DIOPTERS: 42.00
OS_K1POWER_DIOPTERS: 42.25
OS_K2POWER_DIOPTERS: 42.75
OS_AXISANGLE_DEGREES: 089
OD_AXISANGLE_DEGREES: 073

## 2022-11-07 ASSESSMENT — CONFRONTATIONAL VISUAL FIELD TEST (CVF)
OS_FINDINGS: FULL
OD_FINDINGS: FULL

## 2022-11-07 ASSESSMENT — LID EXAM ASSESSMENTS
OS_BLEPHARITIS: 3+
OD_BLEPHARITIS: 3+

## 2022-11-07 ASSESSMENT — AXIALLENGTH_DERIVED
OD_AL: 24.0573
OS_AL: 23.965

## 2022-11-07 ASSESSMENT — SPHEQUIV_DERIVED
OD_SPHEQUIV: 0.125
OS_SPHEQUIV: 0

## 2023-01-17 DIAGNOSIS — I10 PRIMARY HYPERTENSION: ICD-10-CM

## 2023-02-23 ENCOUNTER — TELEPHONE (OUTPATIENT)
Dept: SCHEDULING | Facility: CLINIC | Age: 73
End: 2023-02-23
Payer: COMMERCIAL

## 2023-02-23 NOTE — TELEPHONE ENCOUNTER
Please send pt's labs orders to    [facility]  [place]. [address] [place] [address] [address]. [place] [address]. Phone [phone]. Fax [phone].

## 2023-04-25 DIAGNOSIS — Z95.2 STATUS POST AORTIC VALVE REPLACEMENT: Primary | ICD-10-CM

## 2023-05-05 ENCOUNTER — TELEPHONE (OUTPATIENT)
Dept: CARDIOLOGY | Facility: CLINIC | Age: 73
End: 2023-05-05

## 2023-05-05 ENCOUNTER — OFFICE VISIT (OUTPATIENT)
Dept: CARDIOLOGY | Facility: CLINIC | Age: 73
End: 2023-05-05
Payer: COMMERCIAL

## 2023-05-05 VITALS
WEIGHT: 265 LBS | OXYGEN SATURATION: 98 % | RESPIRATION RATE: 20 BRPM | HEART RATE: 60 BPM | DIASTOLIC BLOOD PRESSURE: 68 MMHG | HEIGHT: 68 IN | SYSTOLIC BLOOD PRESSURE: 134 MMHG | BODY MASS INDEX: 40.16 KG/M2

## 2023-05-05 DIAGNOSIS — R06.83 SNORES: ICD-10-CM

## 2023-05-05 DIAGNOSIS — E11.40 TYPE 2 DIABETES MELLITUS WITH DIABETIC NEUROPATHY, WITHOUT LONG-TERM CURRENT USE OF INSULIN (CMS/HCC): ICD-10-CM

## 2023-05-05 DIAGNOSIS — E78.00 PURE HYPERCHOLESTEROLEMIA: ICD-10-CM

## 2023-05-05 DIAGNOSIS — E66.01 MORBID OBESITY (CMS/HCC): ICD-10-CM

## 2023-05-05 DIAGNOSIS — I25.10 CORONARY ARTERY DISEASE DUE TO CALCIFIED CORONARY LESION: ICD-10-CM

## 2023-05-05 DIAGNOSIS — I10 ESSENTIAL HYPERTENSION: ICD-10-CM

## 2023-05-05 DIAGNOSIS — Z98.890 S/P CARDIAC CATH: ICD-10-CM

## 2023-05-05 DIAGNOSIS — I25.84 CORONARY ARTERY DISEASE DUE TO CALCIFIED CORONARY LESION: ICD-10-CM

## 2023-05-05 DIAGNOSIS — R00.1 BRADYCARDIA: ICD-10-CM

## 2023-05-05 DIAGNOSIS — Z95.2 S/P TAVR (TRANSCATHETER AORTIC VALVE REPLACEMENT): ICD-10-CM

## 2023-05-05 DIAGNOSIS — I48.21 PERMANENT ATRIAL FIBRILLATION (CMS/HCC): Primary | ICD-10-CM

## 2023-05-05 PROBLEM — Z78.9 STATIN INTOLERANCE: Status: ACTIVE | Noted: 2023-05-05

## 2023-05-05 PROCEDURE — 3075F SYST BP GE 130 - 139MM HG: CPT | Performed by: INTERNAL MEDICINE

## 2023-05-05 PROCEDURE — 3078F DIAST BP <80 MM HG: CPT | Performed by: INTERNAL MEDICINE

## 2023-05-05 PROCEDURE — 3008F BODY MASS INDEX DOCD: CPT | Performed by: INTERNAL MEDICINE

## 2023-05-05 PROCEDURE — 99214 OFFICE O/P EST MOD 30 MIN: CPT | Performed by: INTERNAL MEDICINE

## 2023-05-05 PROCEDURE — 93000 ELECTROCARDIOGRAM COMPLETE: CPT | Performed by: INTERNAL MEDICINE

## 2023-05-05 RX ORDER — ACETAMINOPHEN 500 MG
TABLET ORAL
COMMUNITY
Start: 2022-07-12

## 2023-05-05 NOTE — TELEPHONE ENCOUNTER
Pre-cert Request    Name of patient: Yared Hensley    Name of physician: Susanna Miller MD    Type of test: NST     Insurance: [facility]    Location having test done: [facility]    NPI of location: [id number]    Scheduled/Expected date for test: once auth is received     [facility] and [facility].  Was transferred to Kathie.  Kathie provided me with NPI number but advised I had to call central scheduling to get the best fax number to send the orders to.  Kathie advised me to call [phone] to obtain the fax.  Called that number and spoke with Batsheva.  Batsheva provided me with the fax number.  I will fax the orders (holter and NST) once auth is received for NST          NPI-[id number]       f-[phone]

## 2023-05-05 NOTE — LETTER
May 6, 2023     Ángel Cristobal MD  [address] [facility] [address]    Patient: Yared Hensley  YOB: 1950  Date of Visit: 5/5/2023      Dear Dr. Cristobal:    Thank you for referring Yared Hensley to me for evaluation. Below are my notes for this consultation.    If you have questions, please do not hesitate to call me. I look forward to following your patient along with you.         Sincerely,        Susanna Miller MD        CC: No Recipients    Susanna Miller MD  5/6/2023  2:47 PM  Signed       Susanna Miller MD       Reason for visit : Follow up  HPI   Yared Hensley is a 72 y.o. male who presents to the office for cardiovascular follow up.      Dear Dr. Cristobal,    On May 5, 2023 I saw Yared Hensley in the office for his cardiovascular follow-up.  He is now 72 years old.  He is a very complicated history.  He has a longstanding history of hypertension, hyperlipidemia, diabetes, and BPH.  When he originally came to see me his symptoms were significant shortness of breath.  He was found to have a significant component of aortic stenosis.  Ultimately he had a catheterization.  He had a 60% mid LAD lesion.  His right coronary artery and circumflex were patent.  He was found also to have severe aortic stenosis.  Ultimately in April 2022 he had a transaortic valve replacement.    When I originally met him he was in atrial fibrillation that was completely asymptomatic.  His blood pressures have been difficult to control.  In addition I have been greatly concerned that he had significant sleep apnea.  Reportedly he had a sleep study that showed only mild sleep apnea.  Several months after the procedure he developed diplopia which he tells me has been much improved since he has new glasses.    He is now here for reevaluation.    Since I have seen him, he tells me he feels well.  He tells me he has no shortness of breath.  He goes on his treadmill for approximately 15 minutes a few times per  week without difficulty.  I have reviewed his blood pressures from home and in general they are reasonably well controlled with an average being approximately 135 systolic.    He has no chest pain, palpitations, lightheadedness or syncope.    I have reviewed his allergies, medications, social history, family history, medical history, and surgical history.  None of these have changed since I have seen him last.  The rest of his review of systems is completely negative       Problem List:  2023-05: Statin intolerance  2022-04: S/P TAVR (transcatheter aortic valve replacement)  2022-04: Snores  2022-04: Coronary artery disease due to calcified coronary lesion  2022-04: S/P cardiac cath  2022-03: Severe aortic stenosis  2022-03: CHF (congestive heart failure), NYHA class III, acute on   chronic, diastolic (CMS/[facility])  2022-02: Nonrheumatic aortic valve stenosis  2022-01: Atrial fibrillation (CMS/[facility])  2017-12: Pure hypercholesterolemia  2017-12: Morbid obesity (CMS/[facility])  2017-12: Benign prostatic hyperplasia with nocturia  2017-03: Type 2 diabetes mellitus with diabetic neuropathy,   unspecified (CMS/[facility])  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   • amLODIPine (NORVASC) 5 mg tablet Take 10 mg by mouth daily.   • amoxicillin (AMOXIL) 500 mg capsule Please take 1 tablet (500 mg) 1 hour prior to appt and after appt.   • aspirin 81 mg enteric coated tablet Take 1 tablet (81 mg total) by mouth daily.   • cholecalciferol, vitamin D3, 50 mcg (2,000 unit) capsule    • cloNIDine (CATAPRES-TTS-1) 0.1 mg/24 hr Place 1 patch on the skin once a week. (Patient taking differently: Place 1 patch on the skin once a week. 0.2 patch)   • ELIQUIS 5 mg tablet TAKE 1 TABLET BY MOUTH TWICE A DAY   • GLIPIZIDE ORAL 10 mg. 10 mg in the morning 5 mg in the evening   • hydrALAZINE  (APRESOLINE) 100 mg tablet Take 1 tablet (100 mg total) by mouth 3 (three) times a day.   • hydrochlorothiazide (MICROZIDE) 12.5 mg capsule Take 12.5 mg by mouth daily.   • JANUVIA 100 mg tablet Take 100 mg by mouth daily.   • lisinopriL (PRINIVIL) 40 mg tablet Take 40 mg by mouth 2 (two) times a day.   • metFORMIN (GLUCOPHAGE) 500 mg tablet 1,000 mg 2 (two) times a day with meals.     • metoprolol succinate XL (TOPROL-XL) 100 mg 24 hr tablet Take 1 tablet (100 mg total) by mouth daily. (Patient taking differently: Take 100 mg by mouth 2 (two) times a day.)   • spironolactone (ALDACTONE) 25 mg tablet Take 25 mg by mouth daily.   • tamsulosin (FLOMAX) 0.4 mg capsule Take 0.4 mg by mouth daily.   • vit A/vit C/vit E/zinc/copper (PRESERVISION AREDS ORAL) Take by mouth 2 times daily.   • vitamin E 200 unit capsule Take 200 Units by mouth daily.   • ZINC ORAL Take 50 mg by mouth as needed.   • metoprolol succinate XL (TOPROL-XL) 50 mg 24 hr tablet Take 50 mg by mouth daily. Take in the p.m.   • ONETOUCH ULTRA TEST strip    • REPATHA SYRINGE 140 mg/mL syringe syringe Inject 140 mg under the skin.     No current facility-administered medications for this visit.          Allergies:  Patient has no known allergies.    Surgical History:  Past Surgical History:   Procedure Laterality Date   • AORTIC VALVE REPLACEMENT     • COLONOSCOPY     • SKIN BIOPSY      excision of basal cell ca on scalp        Family History:  Family History   Problem Relation Age of Onset   • Hypertension Biological Mother    • Heart disease Biological Father    • Hypertension Biological Father         Social History:  Social History     Socioeconomic History   • Marital status:      Spouse name: None   • Number of children: 3   • Years of education: None   • Highest education level: None   Occupational History   • Occupation: radiation oncologist   Tobacco Use   • Smoking status: Never   • Smokeless tobacco: Never   Vaping Use   • Vaping status:  "Never Used   Substance and Sexual Activity   • Alcohol use: Yes     Alcohol/week: 2.0 standard drinks of alcohol     Types: 2 Glasses of wine per week     Comment: rarely   • Drug use: Never   • Sexual activity: Defer   Social History Narrative    Lives at home with wife and 3 daughters, two story home           Review of Systems  The rest of his review of systems is completely negative.          Objective   Vitals:    05/05/23 1002   BP: 134/68   BP Location: Left upper arm   Patient Position: Sitting   Pulse: 60   Resp: 20   SpO2: 98%   Weight: 120 kg (265 lb)   Height: 1.727 m (5' 8\")     Physical Exam   Pressure is 134/68.  Heart rate was 50 and irregular.  He is comfortable.  Skin is warm and dry.  Conjunctiva are pink.  Affect is appropriate.  No JVD or HJR, although his neck was difficult to examine.  Chest is clear.  Irregular rhythm with distant heart tones.  Early systolic ejection murmur at the aortic area.  No diastolic sounds heard.  No obvious gallops.  Abdomen is obese and soft with good bowel sounds.  No obvious organomegaly.  No clubbing, cyanosis, or edema.  I felt no distal pulses.  No rash.  No obvious musculoskeletal deformities.       Labs:   Lab Results   Component Value Date    WBC 8.04 03/31/2022    HGB 14.2 03/31/2022    HCT 41.2 03/31/2022     03/31/2022    ALT 27 03/22/2022    AST 22 03/22/2022     03/31/2022    K 3.8 03/31/2022     03/31/2022    CREATININE 0.9 03/31/2022    BUN 18 03/31/2022    CO2 22 03/31/2022    INR 1.1 03/30/2022    HGBA1C 5.3 03/22/2022       ECG :  His EKG revealed atrial fibrillation at approximately 49 bpm.  There is evidence of low voltage.    Cardiac Imaging    TRANSTHORACIC ECHO (TTE) COMPLETE 05/10/2022    Interpretation Summary  Technically difficult study.  During the study the patient was in atrial fibrillation with controlled ventricular response  1. Small left ventricular cavity size with normal wall thickness. Normal systolic " function. Visually estimated LVEF 70 percent . No regional wall motion abnormalities. Unable to evaluate diastolic function.  2. The patient is s/p # 29 Noel 3 TAVR 3/30/2022. There is no transvalvular regurgitation. There is trace poorly localized but posterior paravalvular regurgitation. Average peak velocity 2.80 (apex) m/s, mean gradient 20.0 mmHg, calculated aortic valve area 2.4 cm2 (LVOT 2.5 cm), dimensionless index 0.50.  3. Dilated sized aortic root 4.2 cm. Top-normal ascending aorta 3.9 cm.  4. Moderate mitral annular calcification.  Mean gradient 3 mmHg across the mitral valve at a heart rate of 70 bpm.  Mild mitral insufficiency.  5. Severely dilated left atrium.  6. Normal right ventricular size with preserved systolic function.  7. Normal sized right atrium.  8. Structurally normal tricuspid valve. Trace tricuspid regurgitation. Estimated RVSP 41 mmHg.  9. Structurally normal pulmonic valve. There is trace pulmonic regurgitation.  10. Dilated IVC with >50% respiratory variation consistent with normal right sided filling pressures.  11. No pericardial effusion. Prominent epicardial fat pad.  12. Compared to previous TTE 3/31/2022, trace paravalvular regurgitation is visualized. No other significant change.             Problem List Items Addressed This Visit        Nervous    Type 2 diabetes mellitus with diabetic neuropathy, unspecified (CMS/HCC)       Respiratory    Snores       Circulatory    Essential hypertension    Atrial fibrillation (CMS/HCC) - Primary    Relevant Orders    ECG 12 lead (Completed)    S/P TAVR (transcatheter aortic valve replacement)    Coronary artery disease due to calcified coronary lesion       Endocrine/Metabolic    Morbid obesity (CMS/HCC)       Other    Pure hypercholesterolemia    S/P cardiac cath       Impression:    Clinically, Elbert is doing well.  It is time for reevaluation of his aortic valve with an echo.  He will be seeing Dr. Irwin in a few weeks and an echo  will be done at that time.    His heart rates today are low.  A 24-hour Holter will be done to see what his heart rate is like over the course of a day.    He is taking both Aldactone and hydrochlorothiazide.  I told him to stop the Aldactone, particularly since he was also on an ARB.  A BMP will be done in the near future as well lipids and LFTs.    He does have known coronary disease with a 60% proximal LAD lesion.  His LDL cholesterol should be 70 or less.  A pharmacologic stress test will be done to assess him for ischemia in the near future.  His follow-up will depend on the results of his studies    I thank you for allowing me to participate in the care of your patient.  If I can furnish you with any further details, please do not hesitate to contact me.     Susanna Miller MD  5/5/2023    This document was generated utilizing voice recognition technology. A reasonable attempt at proofreading has been made to minimize errors but please excuse any typographical errors which may be present. Please call with any questions.

## 2023-05-05 NOTE — PROGRESS NOTES
Susanna Miller MD       Reason for visit : Follow up  HPI   Yared Hensley is a 72 y.o. male who presents to the office for cardiovascular follow up.      Dear Dr. Cristobal,    On May 5, 2023 I saw Yared Hensley in the office for his cardiovascular follow-up.  He is now 72 years old.  He is a very complicated history.  He has a longstanding history of hypertension, hyperlipidemia, diabetes, and BPH.  When he originally came to see me his symptoms were significant shortness of breath.  He was found to have a significant component of aortic stenosis.  Ultimately he had a catheterization.  He had a 60% mid LAD lesion.  His right coronary artery and circumflex were patent.  He was found also to have severe aortic stenosis.  Ultimately in April 2022 he had a transaortic valve replacement.    When I originally met him he was in atrial fibrillation that was completely asymptomatic.  His blood pressures have been difficult to control.  In addition I have been greatly concerned that he had significant sleep apnea.  Reportedly he had a sleep study that showed only mild sleep apnea.  Several months after the procedure he developed diplopia which he tells me has been much improved since he has new glasses.    He is now here for reevaluation.    Since I have seen him, he tells me he feels well.  He tells me he has no shortness of breath.  He goes on his treadmill for approximately 15 minutes a few times per week without difficulty.  I have reviewed his blood pressures from home and in general they are reasonably well controlled with an average being approximately 135 systolic.    He has no chest pain, palpitations, lightheadedness or syncope.    I have reviewed his allergies, medications, social history, family history, medical history, and surgical history.  None of these have changed since I have seen him last.  The rest of his review of systems is completely negative       Problem List:  2023-05: Statin  intolerance  2022-04: S/P TAVR (transcatheter aortic valve replacement)  2022-04: Snores  2022-04: Coronary artery disease due to calcified coronary lesion  2022-04: S/P cardiac cath  2022-03: Severe aortic stenosis  2022-03: CHF (congestive heart failure), NYHA class III, acute on   chronic, diastolic (CMS/HCC)  2022-02: Nonrheumatic aortic valve stenosis  2022-01: Atrial fibrillation (CMS/HCC)  2017-12: Pure hypercholesterolemia  2017-12: Morbid obesity (CMS/HCC)  2017-12: Benign prostatic hyperplasia with nocturia  2017-03: Type 2 diabetes mellitus with diabetic neuropathy,   unspecified (CMS/HCC)  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   • amLODIPine (NORVASC) 5 mg tablet Take 10 mg by mouth daily.   • amoxicillin (AMOXIL) 500 mg capsule Please take 1 tablet (500 mg) 1 hour prior to appt and after appt.   • aspirin 81 mg enteric coated tablet Take 1 tablet (81 mg total) by mouth daily.   • cholecalciferol, vitamin D3, 50 mcg (2,000 unit) capsule    • cloNIDine (CATAPRES-TTS-1) 0.1 mg/24 hr Place 1 patch on the skin once a week. (Patient taking differently: Place 1 patch on the skin once a week. 0.2 patch)   • ELIQUIS 5 mg tablet TAKE 1 TABLET BY MOUTH TWICE A DAY   • GLIPIZIDE ORAL 10 mg. 10 mg in the morning 5 mg in the evening   • hydrALAZINE (APRESOLINE) 100 mg tablet Take 1 tablet (100 mg total) by mouth 3 (three) times a day.   • hydrochlorothiazide (MICROZIDE) 12.5 mg capsule Take 12.5 mg by mouth daily.   • JANUVIA 100 mg tablet Take 100 mg by mouth daily.   • lisinopriL (PRINIVIL) 40 mg tablet Take 40 mg by mouth 2 (two) times a day.   • metFORMIN (GLUCOPHAGE) 500 mg tablet 1,000 mg 2 (two) times a day with meals.     • metoprolol succinate XL (TOPROL-XL) 100 mg 24 hr tablet Take 1 tablet (100 mg total) by mouth daily. (Patient taking  differently: Take 100 mg by mouth 2 (two) times a day.)   • spironolactone (ALDACTONE) 25 mg tablet Take 25 mg by mouth daily.   • tamsulosin (FLOMAX) 0.4 mg capsule Take 0.4 mg by mouth daily.   • vit A/vit C/vit E/zinc/copper (PRESERVISION AREDS ORAL) Take by mouth 2 times daily.   • vitamin E 200 unit capsule Take 200 Units by mouth daily.   • ZINC ORAL Take 50 mg by mouth as needed.   • metoprolol succinate XL (TOPROL-XL) 50 mg 24 hr tablet Take 50 mg by mouth daily. Take in the p.m.   • ONETOUCH ULTRA TEST strip    • REPATHA SYRINGE 140 mg/mL syringe syringe Inject 140 mg under the skin.     No current facility-administered medications for this visit.          Allergies:  Patient has no known allergies.    Surgical History:  Past Surgical History:   Procedure Laterality Date   • AORTIC VALVE REPLACEMENT     • COLONOSCOPY     • SKIN BIOPSY      excision of basal cell ca on scalp        Family History:  Family History   Problem Relation Age of Onset   • Hypertension Biological Mother    • Heart disease Biological Father    • Hypertension Biological Father         Social History:  Social History     Socioeconomic History   • Marital status:      Spouse name: None   • Number of children: 3   • Years of education: None   • Highest education level: None   Occupational History   • Occupation: radiation oncologist   Tobacco Use   • Smoking status: Never   • Smokeless tobacco: Never   Vaping Use   • Vaping status: Never Used   Substance and Sexual Activity   • Alcohol use: Yes     Alcohol/week: 2.0 standard drinks of alcohol     Types: 2 Glasses of wine per week     Comment: rarely   • Drug use: Never   • Sexual activity: Defer   Social History Narrative    Lives at home with wife and 3 daughters, two story home           Review of Systems  The rest of his review of systems is completely negative.          Objective   Vitals:    05/05/23 1002   BP: 134/68   BP Location: Left upper arm   Patient Position: Sitting  "  Pulse: 60   Resp: 20   SpO2: 98%   Weight: 120 kg (265 lb)   Height: 1.727 m (5' 8\")     Physical Exam   Pressure is 134/68.  Heart rate was 50 and irregular.  He is comfortable.  Skin is warm and dry.  Conjunctiva are pink.  Affect is appropriate.  No JVD or HJR, although his neck was difficult to examine.  Chest is clear.  Irregular rhythm with distant heart tones.  Early systolic ejection murmur at the aortic area.  No diastolic sounds heard.  No obvious gallops.  Abdomen is obese and soft with good bowel sounds.  No obvious organomegaly.  No clubbing, cyanosis, or edema.  I felt no distal pulses.  No rash.  No obvious musculoskeletal deformities.       Labs:   Lab Results   Component Value Date    WBC 8.04 03/31/2022    HGB 14.2 03/31/2022    HCT 41.2 03/31/2022     03/31/2022    ALT 27 03/22/2022    AST 22 03/22/2022     03/31/2022    K 3.8 03/31/2022     03/31/2022    CREATININE 0.9 03/31/2022    BUN 18 03/31/2022    CO2 22 03/31/2022    INR 1.1 03/30/2022    HGBA1C 5.3 03/22/2022       ECG :  His EKG revealed atrial fibrillation at approximately 49 bpm.  There is evidence of low voltage.    Cardiac Imaging    TRANSTHORACIC ECHO (TTE) COMPLETE 05/10/2022    Interpretation Summary  Technically difficult study.  During the study the patient was in atrial fibrillation with controlled ventricular response  1. Small left ventricular cavity size with normal wall thickness. Normal systolic function. Visually estimated LVEF 70 percent . No regional wall motion abnormalities. Unable to evaluate diastolic function.  2. The patient is s/p # 29 Noel 3 TAVR 3/30/2022. There is no transvalvular regurgitation. There is trace poorly localized but posterior paravalvular regurgitation. Average peak velocity 2.80 (apex) m/s, mean gradient 20.0 mmHg, calculated aortic valve area 2.4 cm2 (LVOT 2.5 cm), dimensionless index 0.50.  3. Dilated sized aortic root 4.2 cm. Top-normal ascending aorta 3.9 cm.  4. " Moderate mitral annular calcification.  Mean gradient 3 mmHg across the mitral valve at a heart rate of 70 bpm.  Mild mitral insufficiency.  5. Severely dilated left atrium.  6. Normal right ventricular size with preserved systolic function.  7. Normal sized right atrium.  8. Structurally normal tricuspid valve. Trace tricuspid regurgitation. Estimated RVSP 41 mmHg.  9. Structurally normal pulmonic valve. There is trace pulmonic regurgitation.  10. Dilated IVC with >50% respiratory variation consistent with normal right sided filling pressures.  11. No pericardial effusion. Prominent epicardial fat pad.  12. Compared to previous TTE 3/31/2022, trace paravalvular regurgitation is visualized. No other significant change.             Problem List Items Addressed This Visit        Nervous    Type 2 diabetes mellitus with diabetic neuropathy, unspecified (CMS/HCC)       Respiratory    Snores       Circulatory    Essential hypertension    Atrial fibrillation (CMS/HCC) - Primary    Relevant Orders    ECG 12 lead (Completed)    S/P TAVR (transcatheter aortic valve replacement)    Coronary artery disease due to calcified coronary lesion       Endocrine/Metabolic    Morbid obesity (CMS/HCC)       Other    Pure hypercholesterolemia    S/P cardiac cath       Impression:    Clinically, Elbert is doing well.  It is time for reevaluation of his aortic valve with an echo.  He will be seeing Dr. Irwin in a few weeks and an echo will be done at that time.    His heart rates today are low.  A 24-hour Holter will be done to see what his heart rate is like over the course of a day.    He is taking both Aldactone and hydrochlorothiazide.  I told him to stop the Aldactone, particularly since he was also on an ARB.  A BMP will be done in the near future as well lipids and LFTs.    He does have known coronary disease with a 60% proximal LAD lesion.  His LDL cholesterol should be 70 or less.  A pharmacologic stress test will be done to  assess him for ischemia in the near future.  His follow-up will depend on the results of his studies    I thank you for allowing me to participate in the care of your patient.  If I can furnish you with any further details, please do not hesitate to contact me.     Susanna Miller MD  5/5/2023    This document was generated utilizing voice recognition technology. A reasonable attempt at proofreading has been made to minimize errors but please excuse any typographical errors which may be present. Please call with any questions.

## 2023-05-08 NOTE — TELEPHONE ENCOUNTER
Spoke with pt gave auth info and advised him I faxed script with auth info to [facility]        [phone]

## 2023-05-08 NOTE — TELEPHONE ENCOUNTER
NEELAM/ Devon Iverson  Auth# [id number]  05/08/2023-11/04/2023    Please provide the pt the auth # and assist with scheduling testing

## 2023-05-09 DIAGNOSIS — R07.2 PRECORDIAL PAIN: Primary | ICD-10-CM

## 2023-05-12 ENCOUNTER — OFFICE VISIT (OUTPATIENT)
Dept: CARDIOTHORACIC SURGERY | Facility: CLINIC | Age: 73
End: 2023-05-12
Payer: COMMERCIAL

## 2023-05-12 ENCOUNTER — DOCUMENTATION (OUTPATIENT)
Dept: CARDIOTHORACIC SURGERY | Facility: CLINIC | Age: 73
End: 2023-05-12

## 2023-05-12 ENCOUNTER — HOSPITAL ENCOUNTER (OUTPATIENT)
Dept: CARDIOLOGY | Facility: HOSPITAL | Age: 73
Discharge: HOME | End: 2023-05-12
Attending: NURSE PRACTITIONER
Payer: COMMERCIAL

## 2023-05-12 VITALS
TEMPERATURE: 97 F | SYSTOLIC BLOOD PRESSURE: 132 MMHG | OXYGEN SATURATION: 97 % | BODY MASS INDEX: 32.75 KG/M2 | DIASTOLIC BLOOD PRESSURE: 58 MMHG | HEIGHT: 68 IN | WEIGHT: 216.1 LBS | HEART RATE: 57 BPM | RESPIRATION RATE: 16 BRPM

## 2023-05-12 VITALS
DIASTOLIC BLOOD PRESSURE: 68 MMHG | SYSTOLIC BLOOD PRESSURE: 134 MMHG | WEIGHT: 265 LBS | HEIGHT: 68 IN | BODY MASS INDEX: 40.16 KG/M2 | HEART RATE: 64 BPM

## 2023-05-12 DIAGNOSIS — I35.0 NONRHEUMATIC AORTIC VALVE STENOSIS: ICD-10-CM

## 2023-05-12 DIAGNOSIS — Z95.2 STATUS POST AORTIC VALVE REPLACEMENT: ICD-10-CM

## 2023-05-12 DIAGNOSIS — Z95.2 S/P TAVR (TRANSCATHETER AORTIC VALVE REPLACEMENT): Primary | ICD-10-CM

## 2023-05-12 LAB
AORTIC ROOT ANNULUS - M-MODE: 2.7 CM
AORTIC VALVE MEAN VELOCITY: 1.66 M/S
AORTIC VALVE VELOCITY TIME INTEGRAL: 55.8 CM
AV MEAN GRADIENT: 12 MMHG
AV PEAK GRADIENT: 22 MMHG
AV PEAK VELOCITY-S: 2.33 M/S
AV VALVE AREA INDEX: 0.63
AV VALVE AREA: 1.28 CM2
AV VELOCITY RATIO: 0.4
AVA (VTI): 1.51 CM2
BSA FOR ECHO PROCEDURE: 2.4 M2
CUSP SEPARATION: 1.1 CM
DOP CALC LVOT STROKE VOLUME: 84.35 CM3
EDV (BP): 102 CM3
EF (A4C): 68.7 %
EF A2C: 64 %
EJECTION FRACTION: 66.6 %
ESV (BP): 34.1 CM3
FRACTIONAL SHORTENING: 30.09 %
INTERVENTRICULAR SEPTUM: 1.32 CM
LA ESV (BP): 85.8 CM3
LA ESV INDEX (A2C): 39.46 CM3/M2
LA ESV INDEX (BP): 35.75 CM3/M2
LAAS-AP2: 28.5 CM2
LAAS-AP4: 21.9 CM2
LALD A4C: 5.91 CM
LALD A4C: 6.91 CM
LAV-S: 94.7 CM3
LEFT ATRIUM VOLUME INDEX: 27.96 CM3/M2
LEFT ATRIUM VOLUME: 67.1 CM3
LEFT INTERNAL DIMENSION IN SYSTOLE: 3.02 CM (ref 4.37–6.62)
LEFT VENTRICLE DIASTOLIC VOLUME INDEX: 49.58 CM3/M2
LEFT VENTRICLE DIASTOLIC VOLUME: 119 CM3
LEFT VENTRICLE SYSTOLIC VOLUME INDEX: 15.54 CM3/M2
LEFT VENTRICLE SYSTOLIC VOLUME: 37.3 CM3
LEFT VENTRICULAR INTERNAL DIMENSION IN DIASTOLE: 4.32 CM (ref 7.56–10.51)
LEFT VENTRICULAR POSTERIOR WALL IN END DIASTOLE: 1.16 CM (ref 0.89–1.66)
LV DIASTOLIC VOLUME: 87.5 CM3
LV ESV (APICAL 2 CHAMBER): 31.5 CM3
LVAD-AP2: 29.8 CM2
LVAD-AP4: 34.9 CM2
LVAS-AP2: 16.3 CM2
LVAS-AP4: 17.5 CM2
LVEDVI(A2C): 36.46 CM3/M2
LVEDVI(BP): 42.5 CM3/M2
LVESVI(A2C): 13.13 CM3/M2
LVESVI(BP): 14.21 CM3/M2
LVLD-AP2: 8.49 CM
LVLD-AP4: 8.46 CM
LVLS-AP2: 7.17 CM
LVLS-AP4: 7.23 CM
LVOT 2D: 2.2 CM
LVOT A: 3.8 CM2
LVOT MG: 2 MMHG
LVOT MV: 0.69 M/S
LVOT PEAK VELOCITY: 1 M/S
LVOT PG: 4 MMHG
LVOT STROKE VOLUME INDEX: 35.14 ML/M2
LVOT VTI: 22.2 CM
MLH CV ECHO AVA INDEX VELOCITY RATIO: 0.5
MV E'TISSUE VEL-LAT: 0.08 M/S
MV E'TISSUE VEL-MED: 0.07 M/S
POSTERIOR WALL: 1.16 CM
RVOT VMAX: 0.4 M/S
RVOT VTI: 10.8 CM
TR MAX PG: 30.47 MMHG
TRICUSPID VALVE PEAK REGURGITATION VELOCITY: 2.76 M/S
Z-SCORE OF LEFT VENTRICULAR DIMENSION IN END DIASTOLE: -7.24
Z-SCORE OF LEFT VENTRICULAR DIMENSION IN END SYSTOLE: -4.56
Z-SCORE OF LEFT VENTRICULAR POSTERIOR WALL IN END DIASTOLE: -0.22

## 2023-05-12 PROCEDURE — 99215 OFFICE O/P EST HI 40 MIN: CPT | Performed by: INTERNAL MEDICINE

## 2023-05-12 PROCEDURE — 93306 TTE W/DOPPLER COMPLETE: CPT | Mod: 26 | Performed by: INTERNAL MEDICINE

## 2023-05-12 PROCEDURE — 93306 TTE W/DOPPLER COMPLETE: CPT

## 2023-05-12 PROCEDURE — 3078F DIAST BP <80 MM HG: CPT | Performed by: INTERNAL MEDICINE

## 2023-05-12 PROCEDURE — 3008F BODY MASS INDEX DOCD: CPT | Performed by: INTERNAL MEDICINE

## 2023-05-12 PROCEDURE — 3075F SYST BP GE 130 - 139MM HG: CPT | Performed by: INTERNAL MEDICINE

## 2023-05-12 RX ORDER — AMOXICILLIN 500 MG/1
CAPSULE ORAL
Qty: 3 CAPSULE | Refills: 0 | Status: SHIPPED | OUTPATIENT
Start: 2023-05-12

## 2023-05-12 RX ORDER — AMLODIPINE BESYLATE 10 MG/1
10 TABLET ORAL 2 TIMES DAILY
COMMUNITY
Start: 2023-04-03 | End: 2023-05-12 | Stop reason: SDUPTHER

## 2023-05-12 RX ORDER — GLIPIZIDE 5 MG/1
10 TABLET ORAL 2 TIMES DAILY
COMMUNITY
Start: 2023-04-26

## 2023-05-12 ASSESSMENT — ENCOUNTER SYMPTOMS
WEAKNESS: 0
CHEST TIGHTNESS: 0
SHORTNESS OF BREATH: 0
WOUND: 0
BACK PAIN: 0
ARTHRALGIAS: 0
HEMATURIA: 0
DIZZINESS: 0
NAUSEA: 0
PALPITATIONS: 0
NERVOUS/ANXIOUS: 0
CONFUSION: 0
DIFFICULTY URINATING: 0
AGITATION: 0
FATIGUE: 0
COUGH: 0

## 2023-05-12 NOTE — PROGRESS NOTES
HPI  Patient is a 72 y.o. male  who presents s/p #29 Noel transfemoral TAVR on March 30, 2022.  He was discharged on postoperative day #1. He denies any chest pain, shortness of breath, lightheadedness or dizziness.     Medical History:   Past Medical History:   Diagnosis Date   • Atrial fibrillation (CMS/HCC) 01/28/2022   • Basal cell carcinoma     scalp   • GERD (gastroesophageal reflux disease)    • Hyperlipidemia    • Hypertension    • S/P TAVR (transcatheter aortic valve replacement) 4/4/2022    Transfemoral TAVR #29 Noel on 3-   • TIA (transient ischemic attack)    • Type 2 diabetes mellitus (CMS/HCC)        Surgical History:   Past Surgical History:   Procedure Laterality Date   • AORTIC VALVE REPLACEMENT     • COLONOSCOPY     • SKIN BIOPSY      excision of basal cell ca on scalp       Allergies: Patient has no known allergies.    Current Outpatient Medications   Medication Sig Dispense Refill   • alfuzosin (UROXATRAL) 10 mg 24 hr tablet Take 10 mg by mouth daily.     • amLODIPine (NORVASC) 5 mg tablet Take 10 mg by mouth daily.     • amoxicillin (AMOXIL) 500 mg capsule Please take 1 tablet (500 mg) 1 hour prior to appt and after appt. 3 capsule 0   • aspirin 81 mg enteric coated tablet Take 1 tablet (81 mg total) by mouth daily. 30 tablet 3   • cholecalciferol, vitamin D3, 50 mcg (2,000 unit) capsule      • cloNIDine (CATAPRES-TTS-1) 0.1 mg/24 hr Place 1 patch on the skin once a week. (Patient taking differently: Place 1 patch on the skin once a week. 0.2 patch) 12 patch 3   • ELIQUIS 5 mg tablet TAKE 1 TABLET BY MOUTH TWICE A  tablet 1   • GLIPIZIDE ORAL 10 mg. 10 mg in the morning 5 mg in the evening     • hydrALAZINE (APRESOLINE) 100 mg tablet Take 1 tablet (100 mg total) by mouth 3 (three) times a day. 270 tablet 3   • hydrochlorothiazide (MICROZIDE) 12.5 mg capsule Take 12.5 mg by mouth daily.     • JANUVIA 100 mg tablet Take 100 mg by mouth daily.     • lisinopriL (PRINIVIL) 40 mg  tablet Take 40 mg by mouth 2 (two) times a day.     • metFORMIN (GLUCOPHAGE) 500 mg tablet 1,000 mg 2 (two) times a day with meals.       • metoprolol succinate XL (TOPROL-XL) 100 mg 24 hr tablet Take 1 tablet (100 mg total) by mouth daily. (Patient taking differently: Take 100 mg by mouth 2 (two) times a day.) 30 tablet 0   • ONETOUCH ULTRA TEST strip      • REPATHA SYRINGE 140 mg/mL syringe syringe Inject 140 mg under the skin.     • spironolactone (ALDACTONE) 25 mg tablet Take 25 mg by mouth daily.     • tamsulosin (FLOMAX) 0.4 mg capsule Take 0.4 mg by mouth daily.     • vit A/vit C/vit E/zinc/copper (PRESERVISION AREDS ORAL) Take by mouth 2 times daily.     • vitamin E 200 unit capsule Take 200 Units by mouth daily.     • ZINC ORAL Take 50 mg by mouth as needed.       No current facility-administered medications for this visit.       Social History:   Social History     Socioeconomic History   • Marital status:    • Number of children: 3   Occupational History   • Occupation: radiation oncologist   Tobacco Use   • Smoking status: Never   • Smokeless tobacco: Never   Vaping Use   • Vaping status: Never Used   Substance and Sexual Activity   • Alcohol use: Yes     Alcohol/week: 2.0 standard drinks of alcohol     Types: 2 Glasses of wine per week     Comment: rarely   • Drug use: Never   • Sexual activity: Defer   Social History Narrative    Lives at home with wife and 3 daughters, two story home       Family History:   Family History   Problem Relation Age of Onset   • Hypertension Biological Mother    • Heart disease Biological Father    • Hypertension Biological Father        Review of Systems  Review of Systems   Constitutional: Negative for fatigue.   HENT: Negative for postnasal drip.    Eyes: Negative for visual disturbance.   Respiratory: Negative for cough, chest tightness and shortness of breath.    Cardiovascular: Negative for chest pain, palpitations and leg swelling.   Gastrointestinal:  Negative for nausea.   Genitourinary: Negative for difficulty urinating and hematuria.   Musculoskeletal: Negative for arthralgias, back pain and gait problem.   Skin: Negative for wound.   Neurological: Negative for dizziness, syncope and weakness.   Psychiatric/Behavioral: Negative for agitation, behavioral problems and confusion. The patient is not nervous/anxious.    All other systems reviewed and are negative.      Objective     Vital Signs for the last 24 hours:  There were no vitals taken for this visit.    Physical Exam   Physical Exam  Vitals reviewed.   Constitutional:       General: He is not in acute distress.     Appearance: Normal appearance. He is well-developed and normal weight.   HENT:      Head: Normocephalic.   Eyes:      Pupils: Pupils are equal, round, and reactive to light.   Neck:      Vascular: No JVD.   Cardiovascular:      Rate and Rhythm: Normal rate and regular rhythm.      Pulses: Normal pulses.      Heart sounds: Normal heart sounds. No murmur heard.  Pulmonary:      Effort: Pulmonary effort is normal.      Breath sounds: Normal breath sounds.   Abdominal:      General: Bowel sounds are normal.      Palpations: Abdomen is soft.   Musculoskeletal:         General: No swelling. Normal range of motion.      Cervical back: Normal range of motion and neck supple.      Right lower leg: No edema.      Left lower leg: No edema.   Skin:     General: Skin is warm and dry.      Capillary Refill: Capillary refill takes 2 to 3 seconds.   Neurological:      General: No focal deficit present.      Mental Status: He is alert and oriented to person, place, and time. Mental status is at baseline.   Psychiatric:         Mood and Affect: Mood normal.         Behavior: Behavior normal.         Thought Content: Thought content normal.         Judgment: Judgment normal.       Assessment/Plan   Assessment:  S/P #29 Noel transfemoral TAVR  Echo reviewed. The most recent echocardiogram shows normal function  of the prosthetic valve.  KRISHNA  1.6  ,Mean gradient 12 mmHG, Tricuspid regurgitation trace, EF 65 %.  No paravalvular AI, No central AI.  [facility] I.  Patient remains on daily diuretics.  There have been no signs or symptoms of congestive heart failure.  No chest discomfort.  No syncopal or presyncopal events.  The patient is tolerating medical therapy.  The patient was instructed regarding appropriate antibiotic prophylaxis.  We recommend continuing to monitor the valve by serial echocardiograms.  Plan:  Ongoing medical management with cardiology.  No physical limitations or restrictions at this time.  Continue Eliquis for Afib purposes.  Follow-up with regular cardiologist from now.    5/12/2023 12:16 PM

## 2023-05-12 NOTE — TELEPHONE ENCOUNTER
Medicine Refill Request    Last Office: 5/12/2023   Last Consult Visit: 3/22/2022  Last Telemedicine Visit: Visit date not found    Next Appointment: Visit date not found      Current Outpatient Medications:   •  alfuzosin (UROXATRAL) 10 mg 24 hr tablet, Take 10 mg by mouth daily., Disp: , Rfl:   •  amLODIPine (NORVASC) 5 mg tablet, Take 10 mg by mouth daily., Disp: , Rfl:   •  amoxicillin (AMOXIL) 500 mg capsule, Please take 1 tablet (500 mg) 1 hour prior to appt and after appt., Disp: 3 capsule, Rfl: 0  •  aspirin 81 mg enteric coated tablet, Take 1 tablet (81 mg total) by mouth daily., Disp: 30 tablet, Rfl: 3  •  cholecalciferol, vitamin D3, 50 mcg (2,000 unit) capsule, , Disp: , Rfl:   •  cloNIDine (CATAPRES-TTS-1) 0.1 mg/24 hr, Place 1 patch on the skin once a week. (Patient taking differently: Place 1 patch on the skin once a week. 0.2 patch), Disp: 12 patch, Rfl: 3  •  ELIQUIS 5 mg tablet, TAKE 1 TABLET BY MOUTH TWICE A DAY, Disp: 180 tablet, Rfl: 1  •  glipiZIDE (GLUCOTROL) 5 mg tablet, Take 10 mg by mouth 2 (two) times a day., Disp: , Rfl:   •  hydrALAZINE (APRESOLINE) 100 mg tablet, Take 1 tablet (100 mg total) by mouth 3 (three) times a day., Disp: 270 tablet, Rfl: 3  •  hydrochlorothiazide (MICROZIDE) 12.5 mg capsule, Take 12.5 mg by mouth daily., Disp: , Rfl:   •  JANUVIA 100 mg tablet, Take 100 mg by mouth daily., Disp: , Rfl:   •  lisinopriL (PRINIVIL) 40 mg tablet, Take 40 mg by mouth 2 (two) times a day., Disp: , Rfl:   •  metFORMIN (GLUCOPHAGE) 500 mg tablet, 1,000 mg 2 (two) times a day with meals.  , Disp: , Rfl:   •  metoprolol succinate XL (TOPROL-XL) 100 mg 24 hr tablet, Take 1 tablet (100 mg total) by mouth daily. (Patient taking differently: Take 100 mg by mouth 2 (two) times a day.), Disp: 30 tablet, Rfl: 0  •  ONETOUCH ULTRA TEST strip, , Disp: , Rfl:   •  REPATHA SYRINGE 140 mg/mL syringe syringe, Inject 140 mg under the skin., Disp: , Rfl:   •  spironolactone (ALDACTONE) 25 mg tablet,  Take 25 mg by mouth daily., Disp: , Rfl:   •  tamsulosin (FLOMAX) 0.4 mg capsule, Take 0.4 mg by mouth daily., Disp: , Rfl:   •  vit A/vit C/vit E/zinc/copper (PRESERVISION AREDS ORAL), Take by mouth 2 times daily., Disp: , Rfl:   •  vitamin E 200 unit capsule, Take 200 Units by mouth daily., Disp: , Rfl:   •  ZINC ORAL, Take 50 mg by mouth as needed., Disp: , Rfl:       BP Readings from Last 3 Encounters:   05/12/23 (!) 132/58   05/12/23 134/68   05/05/23 134/68       Recent Lab results:  No results found for: CHOL, No results found for: HDL, No results found for: LDLCALC, No results found for: TRIG     Lab Results   Component Value Date    GLUCOSE 146 (H) 03/31/2022   ,   Lab Results   Component Value Date    HGBA1C 5.3 03/22/2022         Lab Results   Component Value Date    CREATININE 0.9 03/31/2022       No results found for: TSH

## 2023-06-05 ENCOUNTER — DOCTOR'S OFFICE (OUTPATIENT)
Dept: URBAN - NONMETROPOLITAN AREA CLINIC 1 | Facility: CLINIC | Age: 73
Setting detail: OPHTHALMOLOGY
End: 2023-06-05
Payer: COMMERCIAL

## 2023-06-05 DIAGNOSIS — E11.3293: ICD-10-CM

## 2023-06-05 DIAGNOSIS — H25.13: ICD-10-CM

## 2023-06-05 DIAGNOSIS — H43.813: ICD-10-CM

## 2023-06-05 DIAGNOSIS — Z79.84: ICD-10-CM

## 2023-06-05 DIAGNOSIS — H35.413: ICD-10-CM

## 2023-06-05 PROCEDURE — 99214 OFFICE O/P EST MOD 30 MIN: CPT | Performed by: OPHTHALMOLOGY

## 2023-06-05 PROCEDURE — 92134 CPTRZ OPH DX IMG PST SGM RTA: CPT | Performed by: OPHTHALMOLOGY

## 2023-06-05 ASSESSMENT — AXIALLENGTH_DERIVED
OS_AL: 23.965
OD_AL: 24.0573

## 2023-06-05 ASSESSMENT — KERATOMETRY
OD_K1POWER_DIOPTERS: 42.00
OS_K1POWER_DIOPTERS: 42.25
OS_AXISANGLE_DEGREES: 089
OD_K2POWER_DIOPTERS: 42.25
OD_AXISANGLE_DEGREES: 073
OS_K2POWER_DIOPTERS: 42.75

## 2023-06-05 ASSESSMENT — REFRACTION_CURRENTRX
OD_CYLINDER: -1.00
OD_OVR_VA: 20/
OS_CYLINDER: -1.00
OS_VPRISM_DIRECTION: SV
OS_SPHERE: PLANO
OS_OVR_VA: 20/
OD_SPHERE: PLANO
OS_AXIS: 086
OD_VPRISM_DIRECTION: SV
OD_AXIS: 088

## 2023-06-05 ASSESSMENT — REFRACTION_AUTOREFRACTION
OD_CYLINDER: -1.75
OD_SPHERE: +1.00
OS_SPHERE: +0.50
OD_AXIS: 106
OS_CYLINDER: -1.00
OS_AXIS: 095

## 2023-06-05 ASSESSMENT — SPHEQUIV_DERIVED
OD_SPHEQUIV: 0.125
OS_SPHEQUIV: 0

## 2023-06-05 ASSESSMENT — LID EXAM ASSESSMENTS
OD_BLEPHARITIS: 3+
OS_BLEPHARITIS: 3+

## 2023-06-05 ASSESSMENT — CONFRONTATIONAL VISUAL FIELD TEST (CVF)
OS_FINDINGS: FULL
OD_FINDINGS: FULL

## 2023-06-05 ASSESSMENT — VISUAL ACUITY
OD_BCVA: 20/20
OS_BCVA: 20/25-1

## 2023-06-23 NOTE — H&P (VIEW-ONLY)
Dr Pedroza asked Dr Irwin to see Dr Hensley for a TAVR evaluation. He was referred by Dr Miller. He had his TAVR CT today. He will need a cardiac cath and prefers to have this with Dr Pedroza.  After his cath is completed will have him return for full consultation with Dr Irwin. Will have him get his carotid ultrasound and PATs on the day of his consultation as he lives a 2 hour drive from our campus.     69yoM PMHx DM, COPD, CABG 6 years ago not on home O2 presenting for suture removal and evaluation of leg swelling for the past 5 days. 5 days ago patient was seen here s/p fall at home, had multiple sutures placed in his face. Pt reports no issues with facial lacerations since then, he has followed up as scheduled with opthalmology and has an appointment with ENT in 3 days. Denies any headaches, dizziness, vision changes    Pt and wife at bedside report that patients feet have seemed more swollen since the fall, and that this swelling is new. Denies any prior history of CHF, any current or prior chest pain, cough, fever. Pt does report more difficulty getting around over the past few days, using a walker has helped.

## 2023-07-20 ENCOUNTER — TELEPHONE (OUTPATIENT)
Dept: SCHEDULING | Facility: CLINIC | Age: 73
End: 2023-07-20

## 2023-07-20 NOTE — TELEPHONE ENCOUNTER
Pt's assistant requesting Myocardial stress test script faxed to doctor office at    Fax# [phone]

## 2023-08-10 ENCOUNTER — TELEPHONE (OUTPATIENT)
Dept: SCHEDULING | Facility: CLINIC | Age: 73
End: 2023-08-10
Payer: COMMERCIAL

## 2023-08-10 DIAGNOSIS — E78.00 PURE HYPERCHOLESTEROLEMIA: ICD-10-CM

## 2023-08-10 DIAGNOSIS — I25.84 CORONARY ARTERY DISEASE DUE TO CALCIFIED CORONARY LESION: ICD-10-CM

## 2023-08-10 DIAGNOSIS — E11.40 TYPE 2 DIABETES MELLITUS WITH DIABETIC NEUROPATHY, WITHOUT LONG-TERM CURRENT USE OF INSULIN (CMS/HCC): ICD-10-CM

## 2023-08-10 DIAGNOSIS — I25.10 CORONARY ARTERY DISEASE DUE TO CALCIFIED CORONARY LESION: ICD-10-CM

## 2023-08-10 DIAGNOSIS — R00.1 BRADYCARDIA: ICD-10-CM

## 2023-08-15 NOTE — TELEPHONE ENCOUNTER
L/M adviser per Dr Miller cholesterol looks good. Asked Yared to send us a message via my chart to let us know what date he has him pharm stress test scheduled for

## 2023-08-15 NOTE — TELEPHONE ENCOUNTER
Initial SW/CM Assessment/Plan of Care Note     Baseline Assessment  80 year old admitted 4/30/2021 as Observation with a diagnosis of worsening mentation and frequent falls.   Prior to admission patient was living with Children, Spouse/significant other and residing in a House.  Patient does  have a Power of  for Healthcare.  Document is activated.  Agent is Madelaine- patient's daughter. Patient’s Primary Care Provider is Amos Montiel MD.     Medical History  Past Medical History:   Diagnosis Date   • Arthritis    • Bundle branch block, unspecified 1976    Left bundle branch block   • Diverticulosis of colon (without mention of hemorrhage) 1991    Diverticulosis right hemicolon per BE   • Essential hypertension, benign    • Hyperlipidemia    • Nontoxic multinodular goiter 5/07    TSH normal.   • Osteoporosis 6/2014    declined Rx until 5/2016 > start alendronate.   • Other primary cardiomyopathies 02/2004   • PONV (postoperative nausea and vomiting)     with last colonoscopy       Prior to Admission Status  Functional Status  Ambulation: Independent/Self  Bathing: Significant Other  Dressing: Significant Other  Toileting: Independent/Self  Medication Preparation: Family, Significant Other  Medication Administration: Independent/Self  Transportation: Family    Agency/Support  Type of Services Prior to Hospitalization: None  Support Systems: Children  Home Devices/Equipment: Mobility assist device  Mobility Assist Devices: None  Sensory Support Devices: None    Current Status  PT Ambulation Tips: Use gait belt, Walk in omer, Walk to bathroom, Needs minimal assist(pt doesn't use walker at baseline. So either with or without WW)  PT Transfer Tips: Needs supervision, Use gait belt(CGA. WW or no WW)   OT Bathing Tips:    OT Dressing Tips:    OT Toileting Tips:    OT Feeding Tips:    SLP Swallow/Feeding Tips:    SLP Comm/Cog Tips:    Current Mental Status:    Stressors:      Insurance  Primary: AARP  Yes   MEDICARE ADVANTAGE  Secondary: N/A    Barriers to Discharge  Identified Barriers to Discharge/Transition Planning: Assessment/stabilization in progress    Progress Note  SW consulted for discharge planning. Patient was admitted d/t worsening mentation and frequent falls. POA/HC is activated and the primary agent is patient's daughter Madelaine. PT/OT sessions completed yesterday with recommendations for MICHELLE placement    Call placed to Madelaine to discsus discharge plans. Madelaine was grateful for SW call and eager to discuss discharge plans. Baseline function discussed. Patient resides with her spouse and adult son in single level raised ranch home in [place]. Madelaine mentioned recently the patient has become increasingly weak at home and fallng frequently. Madelaine has noted an decreased appetite as well. Patient struggles with her mentation and memory. Gissel is able to ambulate without an assistance device and independent with her toileting tasks. Patient's spouse is 86 and as physical limitations at home as well. Spouse provide assistance with dressing, showers and medication management. Madelaine is in the home 20-25 hours per week and provides assistance with cooking, cleaning, medication management, driving and errands. Madelaine lives locally in [place] about 10 minutes away. Due to the frequent falls in the home the patient was attending out-patient PT sessions at the [facility] in [place].     Anticipated discharge plans discussed. SW reviewed the recommendation for MICHELLE placement. Madelaine is agreeable and feels the patient is to weak to return home at this time. [facility] Medicare MICHELLE listing reviewed. Madelaine would prefer 1) [facility] and 2) [facility]. Madelaine also mentioned they are considering assisted living or memory care placement in the future.     Referrals e-faxed to both facilities. PAUL/URVASHI will remain involved for discharge planning and await facility responses.     ROXANNA Saravia  Cell phone [phone]  Weekends  please call [phone]      Plan  SW/CM - Recommendations for Discharge: Sub-acute nursing home   PT - Recommendations for Discharge: Sub-acute nursing home, SNF    OT - Recommendations for Discharge: Sub-acute nursing home    SLP - Recommendations for Discharge:     Anticipate patient will need post-hospital services. Necessary services are available.  Anticipate patient cannot return to the environment from which patient entered the hospital.   Anticipate patient cannot provide self-care at discharge.    Refer to SW/CM Flowsheet for Goals and objective data.

## 2023-09-25 ENCOUNTER — TELEPHONE (OUTPATIENT)
Dept: SCHEDULING | Facility: CLINIC | Age: 73
End: 2023-09-25
Payer: COMMERCIAL

## 2023-09-25 NOTE — TELEPHONE ENCOUNTER
Marion from [facility]    Requesting ts script for NM ST be faxed to  F: [phone]    Can be reached at [phone]

## 2023-09-25 NOTE — TELEPHONE ENCOUNTER
Faxed..    Fax not going through, called and got a new fax number [phone]    Fax still not going through, emailed to Marion [email or web address]

## 2023-10-03 NOTE — TELEPHONE ENCOUNTER
Felipa garsia/ [facility] called to check status of pre cert for stress test.     Name of patient: Yared Hensley    Name of physician: Susanna Miller MD    Type of test: NM MYOCARDIAL STRESS REST PHARM    Insurance: [facility]    Location having test done: [facility]     NPI of location: [id number]    Scheduled/Expected date for test: 10/17/23    Additional notes Felipa can be reached at [phone] option 1.

## 2023-10-04 NOTE — TELEPHONE ENCOUNTER
Precert Auth Scheduling:     Name: Yared Hensley    Auth Location: [facility]    Auth Number: [id number]    Valid dates: 10/04/2023-11/03/2023    Scheduling notes:    1. Spoke with patient provided auth information. Pt is already scheduled for NST the end of this month

## 2023-10-13 ENCOUNTER — TELEPHONE (OUTPATIENT)
Dept: SCHEDULING | Facility: CLINIC | Age: 73
End: 2023-10-13
Payer: COMMERCIAL

## 2023-10-13 DIAGNOSIS — R07.2 PRECORDIAL PAIN: Primary | ICD-10-CM

## 2023-10-13 NOTE — TELEPHONE ENCOUNTER
Lolis Nurse From [facility] would like to know NM Stress test with Pharm - Mel  If so, they need a new script stating Mel scan.   F: [phone]  Lolis can be reached at  or     TY   Protopic Pregnancy And Lactation Text: This medication is Pregnancy Category C. It is unknown if this medication is excreted in breast milk when applied topically.

## 2023-10-17 ENCOUNTER — TELEPHONE (OUTPATIENT)
Dept: SCHEDULING | Facility: CLINIC | Age: 73
End: 2023-10-17
Payer: COMMERCIAL

## 2023-10-17 NOTE — TELEPHONE ENCOUNTER
Medical Records Request     Name of caller: Alycia    Relationship to patient: office staff    Whos requesting copy of medical records?   Dr. Yared Hensley     Records being requested: cardiac cath March 2022    Fax number: [phone]    Best contact number: [phone]

## 2023-10-18 ENCOUNTER — TELEPHONE (OUTPATIENT)
Dept: CARDIOLOGY | Facility: CLINIC | Age: 73
End: 2023-10-18

## 2023-10-18 ENCOUNTER — TELEPHONE (OUTPATIENT)
Dept: CARDIOLOGY | Facility: CLINIC | Age: 73
End: 2023-10-18
Payer: COMMERCIAL

## 2023-10-18 DIAGNOSIS — I35.0 NONRHEUMATIC AORTIC VALVE STENOSIS: ICD-10-CM

## 2023-10-18 DIAGNOSIS — E78.00 PURE HYPERCHOLESTEROLEMIA: Primary | ICD-10-CM

## 2023-10-18 DIAGNOSIS — I25.84 CORONARY ARTERY DISEASE DUE TO CALCIFIED CORONARY LESION: ICD-10-CM

## 2023-10-18 DIAGNOSIS — Z95.2 S/P TAVR (TRANSCATHETER AORTIC VALVE REPLACEMENT): ICD-10-CM

## 2023-10-18 DIAGNOSIS — R07.2 PRECORDIAL PAIN: ICD-10-CM

## 2023-10-18 DIAGNOSIS — Z95.2 STATUS POST AORTIC VALVE REPLACEMENT: ICD-10-CM

## 2023-10-18 DIAGNOSIS — I25.10 CORONARY ARTERY DISEASE DUE TO CALCIFIED CORONARY LESION: ICD-10-CM

## 2023-10-18 NOTE — TELEPHONE ENCOUNTER
On October 18, 2023 I spoke with Yared Hensley.  He did have a recent pharmacologic stress test which I am happy to say showed no ischemia and his ejection fraction was normal.    He tells me that his last LDL was less than 70 on Repatha.    I will see him again in 18 months.  At that time another pharmacologic stress test will be done as well as an echo to assess his TAVR.    Lipids and LFTs will be done in a year.  Certainly I will be happy to see him at any time if he should have symptoms

## 2023-10-18 NOTE — TELEPHONE ENCOUNTER
Mailbox is full unable to leave msg.    Pt needs to have blood work completed in 1yr lipid/LFT.    Additionally, Pt needs to have a pharma test completed closer to where he lives in 1-2wk prior to OV. He will also need to have an OV w/ TTE on the same day in 18mo around April 2025.    A/C: ok to schedule OV w/ TTE same day for April 2025.

## 2024-02-08 ENCOUNTER — DOCTOR'S OFFICE (OUTPATIENT)
Dept: URBAN - NONMETROPOLITAN AREA CLINIC 1 | Facility: CLINIC | Age: 74
Setting detail: OPHTHALMOLOGY
End: 2024-02-08
Payer: COMMERCIAL

## 2024-02-08 DIAGNOSIS — H35.413: ICD-10-CM

## 2024-02-08 DIAGNOSIS — H25.13: ICD-10-CM

## 2024-02-08 DIAGNOSIS — H43.813: ICD-10-CM

## 2024-02-08 DIAGNOSIS — E11.3293: ICD-10-CM

## 2024-02-08 PROCEDURE — 92134 CPTRZ OPH DX IMG PST SGM RTA: CPT | Performed by: OPHTHALMOLOGY

## 2024-02-08 PROCEDURE — 92014 COMPRE OPH EXAM EST PT 1/>: CPT | Performed by: OPHTHALMOLOGY

## 2024-02-08 ASSESSMENT — CONFRONTATIONAL VISUAL FIELD TEST (CVF)
OD_FINDINGS: FULL
OS_FINDINGS: FULL

## 2024-02-08 ASSESSMENT — LID EXAM ASSESSMENTS
OD_BLEPHARITIS: 3+
OS_BLEPHARITIS: 3+

## 2024-04-25 DIAGNOSIS — I10 ESSENTIAL HYPERTENSION: ICD-10-CM

## 2024-04-25 RX ORDER — HYDRALAZINE HYDROCHLORIDE 100 MG/1
100 TABLET, FILM COATED ORAL 3 TIMES DAILY
Qty: 270 TABLET | Refills: 1 | Status: SHIPPED | OUTPATIENT
Start: 2024-04-25 | End: 2024-08-04

## 2024-05-15 ENCOUNTER — TELEPHONE (OUTPATIENT)
Dept: CARDIOLOGY | Facility: CLINIC | Age: 74
End: 2024-05-15

## 2024-05-15 NOTE — TELEPHONE ENCOUNTER
----- Message from Janice Oakes sent at 10/18/2023  3:25 PM EDT -----  Regarding: FW: Lipids/LFTs/appointment/echo/pharmacologic stress test  Blood work orders has been mailed to Pt & a msg was set through [employer] for the Pt to schedule his 18mo f/u w/ TTE.    Please reach out to Pt to see if they would like to schedule OV w/ TTE same day for April 2025  Additionally, please confirm location for Pharma test if not done yet.  ----- Message -----  From: Sebastian Perez MA  Sent: 10/18/2023   1:21 PM EDT  To: Janice Oakes  Subject: RE: Lipids/LFTs/appointment/echo/pharmacolog#    All ordered  ----- Message -----  From: Susanna Miller MD  Sent: 10/18/2023  12:08 PM EDT  To: Sebastian Perez MA; Janice Oakes  Subject: Lipids/LFTs/appointment/echo/pharmacologic s#    He needs lipids and LFTs in a year.    He needs an appointment in 18 months with a pharmacologic stress test done prior.  He usually gets this done close to where he lives.  He needs an echo on the same day that he comes in.

## 2024-05-22 ENCOUNTER — TELEPHONE (OUTPATIENT)
Dept: SCHEDULING | Facility: CLINIC | Age: 74
End: 2024-05-22
Payer: COMMERCIAL

## 2024-05-22 NOTE — TELEPHONE ENCOUNTER
Patient Name: Yared Hensley    Caller name: Yared Yaron Ayden      Relationship: Self    Reason for call:   Pt states Dr Pedroza did procedure on 2/25/22. Pt states he would like to discuss another Procedure. Pt declined to give further details    Callback number: [phone]

## 2024-05-22 NOTE — TELEPHONE ENCOUNTER
On May 22, 2024 I called Yared Hensley to answer his questions.  I left a message for him to return my call

## 2024-05-22 NOTE — TELEPHONE ENCOUNTER
Patient does not follow with Dr Pedroza, patient will need to be seen by primary cardiologist Dr. Miller and discuss if patient is appropriate for another cath.  Will forward to Dr. Miller's group

## 2024-05-30 DIAGNOSIS — E78.00 PURE HYPERCHOLESTEROLEMIA: ICD-10-CM

## 2024-05-31 ENCOUNTER — TELEPHONE (OUTPATIENT)
Dept: CARDIOLOGY | Facility: CLINIC | Age: 74
End: 2024-05-31
Payer: COMMERCIAL

## 2024-07-22 ENCOUNTER — HOSPITAL ENCOUNTER (OUTPATIENT)
Dept: CARDIOLOGY | Facility: CLINIC | Age: 74
Discharge: HOME | End: 2024-07-22
Attending: INTERNAL MEDICINE
Payer: COMMERCIAL

## 2024-07-22 ENCOUNTER — OFFICE VISIT (OUTPATIENT)
Dept: CARDIOLOGY | Facility: CLINIC | Age: 74
End: 2024-07-22
Payer: COMMERCIAL

## 2024-07-22 VITALS
OXYGEN SATURATION: 97 % | HEIGHT: 68 IN | BODY MASS INDEX: 40.47 KG/M2 | SYSTOLIC BLOOD PRESSURE: 130 MMHG | WEIGHT: 267 LBS | HEART RATE: 54 BPM | DIASTOLIC BLOOD PRESSURE: 70 MMHG

## 2024-07-22 VITALS
DIASTOLIC BLOOD PRESSURE: 70 MMHG | WEIGHT: 267 LBS | HEIGHT: 68 IN | SYSTOLIC BLOOD PRESSURE: 130 MMHG | BODY MASS INDEX: 40.47 KG/M2

## 2024-07-22 DIAGNOSIS — Z95.2 S/P TAVR (TRANSCATHETER AORTIC VALVE REPLACEMENT): ICD-10-CM

## 2024-07-22 DIAGNOSIS — Z95.2 STATUS POST AORTIC VALVE REPLACEMENT: ICD-10-CM

## 2024-07-22 DIAGNOSIS — I35.0 NONRHEUMATIC AORTIC VALVE STENOSIS: ICD-10-CM

## 2024-07-22 DIAGNOSIS — Z78.9 STATIN INTOLERANCE: ICD-10-CM

## 2024-07-22 DIAGNOSIS — I50.33 CHF (CONGESTIVE HEART FAILURE), NYHA CLASS III, ACUTE ON CHRONIC, DIASTOLIC (CMS/HCC): ICD-10-CM

## 2024-07-22 DIAGNOSIS — E11.40 TYPE 2 DIABETES MELLITUS WITH DIABETIC NEUROPATHY, WITHOUT LONG-TERM CURRENT USE OF INSULIN (CMS/HCC): ICD-10-CM

## 2024-07-22 DIAGNOSIS — I10 ESSENTIAL HYPERTENSION: ICD-10-CM

## 2024-07-22 DIAGNOSIS — I25.10 CORONARY ARTERY DISEASE DUE TO CALCIFIED CORONARY LESION: ICD-10-CM

## 2024-07-22 DIAGNOSIS — Z95.2 STATUS POST AORTIC VALVE REPLACEMENT: Primary | ICD-10-CM

## 2024-07-22 DIAGNOSIS — I35.0 SEVERE AORTIC STENOSIS: ICD-10-CM

## 2024-07-22 DIAGNOSIS — Z98.890 S/P CARDIAC CATH: ICD-10-CM

## 2024-07-22 DIAGNOSIS — I25.84 CORONARY ARTERY DISEASE DUE TO CALCIFIED CORONARY LESION: ICD-10-CM

## 2024-07-22 DIAGNOSIS — I48.21 PERMANENT ATRIAL FIBRILLATION (CMS/HCC): ICD-10-CM

## 2024-07-22 DIAGNOSIS — E78.00 PURE HYPERCHOLESTEROLEMIA: ICD-10-CM

## 2024-07-22 LAB
AORTIC ROOT ANNULUS: 3.4 CM
AORTIC VALVE AT: 62 MS
AORTIC VALVE MEAN VELOCITY: 1.73 M/S
AORTIC VALVE VELOCITY TIME INTEGRAL: 57.3 CM
ASCENDING AORTA: 3.7 CM
AV MEAN GRADIENT: 14 MMHG
AV PEAK GRADIENT: 26 MMHG
AV PEAK VELOCITY-S: 2.57 M/S
AV VALVE AREA INDEX: 0.71
AV VALVE AREA: 1.25 CM2
AV VELOCITY RATIO: 0.45
AVA (VTI): 1.71 CM2
BSA FOR ECHO PROCEDURE: 2.41 M2
DOP CALC LVOT STROKE VOLUME: 98.02 CM3
E WAVE DECELERATION TIME: 259 MS
E/E' RATIO: 21.5
E/LAT E' RATIO: 11.9
EDV (BP): 125 CM3
EF (A4C): 59.1 %
EF A2C: 65.7 %
EJECTION FRACTION: 63 %
EST RIGHT VENT SYSTOLIC PRESSURE BY TRICUSPID REGURGITATION JET: 37 MMHG
ESV (BP): 46.3 CM3
FRACTIONAL SHORTENING: 40.12 %
INTERVENTRICULAR SEPTUM: 1.03 CM
LA ESV (BP): 161 CM3
LA ESV INDEX (A2C): 67.22 CM3/M2
LA ESV INDEX (BP): 66.8 CM3/M2
LA/AORTA RATIO: 1.53
LAAS-AP2: 36.5 CM2
LAAS-AP4: 35.7 CM2
LAD 2D: 5.2 CM
LALD A4C: 6.62 CM
LALD A4C: 7.44 CM
LAV-S: 162 CM3
LEFT ATRIUM VOLUME INDEX: 59.75 CM3/M2
LEFT ATRIUM VOLUME: 144 CM3
LEFT INTERNAL DIMENSION IN SYSTOLE: 3.09 CM (ref 4.45–6.74)
LEFT VENTRICLE DIASTOLIC VOLUME INDEX: 49.79 CM3/M2
LEFT VENTRICLE DIASTOLIC VOLUME: 120 CM3
LEFT VENTRICLE SYSTOLIC VOLUME INDEX: 20.37 CM3/M2
LEFT VENTRICLE SYSTOLIC VOLUME: 49.1 CM3
LEFT VENTRICULAR INTERNAL DIMENSION IN DIASTOLE: 5.16 CM (ref 7.71–10.71)
LEFT VENTRICULAR POSTERIOR WALL IN END DIASTOLE: 1.09 CM (ref 0.9–1.68)
LV DIASTOLIC VOLUME: 125 CM3
LV ESV (APICAL 2 CHAMBER): 42.9 CM3
LVAD-AP2: 35.9 CM2
LVAD-AP4: 34.7 CM2
LVAS-AP2: 18.7 CM2
LVAS-AP4: 20.5 CM2
LVEDVI(A2C): 51.87 CM3/M2
LVEDVI(BP): 51.87 CM3/M2
LVESVI(A2C): 17.8 CM3/M2
LVESVI(BP): 19.21 CM3/M2
LVLD-AP2: 8.78 CM
LVLD-AP4: 8.15 CM
LVLS-AP2: 6.93 CM
LVLS-AP4: 7.14 CM
LVOT 2D: 2.2 CM
LVOT A: 3.8 CM2
LVOT MG: 3 MMHG
LVOT MV: 0.74 M/S
LVOT PEAK VELOCITY: 1.08 M/S
LVOT PG: 5 MMHG
LVOT STROKE VOLUME INDEX: 40.67 ML/M2
LVOT VTI: 25.8 CM
MLH CV ECHO AVA INDEX VELOCITY RATIO: 0.5
MV E'TISSUE VEL-LAT: 0.14 M/S
MV E'TISSUE VEL-MED: 0.08 M/S
MV PEAK E VEL: 1.68 M/S
MV STENOSIS PRESSURE HALF TIME: 76 MS
MV VALVE AREA P 1/2 METHOD: 2.89 CM2
POSTERIOR WALL: 1.09 CM
QRS DURATION: 88
QT INTERVAL: 432
QTC CALCULATION(BAZETT): 445
R AXIS: -21
RAP: 3 MMHG
SEPTAL TISSUE DOPPLER FREE WALL LATE DIA VELOCITY (APICAL 4 CHAMBER VIEW): 0.13 M/S
T WAVE AXIS: 42
TR MAX PG: 33.87 MMHG
TRICUSPID VALVE PEAK REGURGITATION VELOCITY: 2.91 M/S
VENTRICULAR RATE: 64
Z-SCORE OF LEFT VENTRICULAR DIMENSION IN END DIASTOLE: -5.65
Z-SCORE OF LEFT VENTRICULAR DIMENSION IN END SYSTOLE: -4.52
Z-SCORE OF LEFT VENTRICULAR POSTERIOR WALL IN END DIASTOLE: -0.61

## 2024-07-22 PROCEDURE — 3008F BODY MASS INDEX DOCD: CPT | Performed by: INTERNAL MEDICINE

## 2024-07-22 PROCEDURE — 93306 TTE W/DOPPLER COMPLETE: CPT | Performed by: INTERNAL MEDICINE

## 2024-07-22 PROCEDURE — 93000 ELECTROCARDIOGRAM COMPLETE: CPT | Performed by: INTERNAL MEDICINE

## 2024-07-22 PROCEDURE — 99214 OFFICE O/P EST MOD 30 MIN: CPT | Performed by: INTERNAL MEDICINE

## 2024-07-22 RX ORDER — CARVEDILOL 25 MG/1
25 TABLET ORAL 2 TIMES DAILY WITH MEALS
Qty: 180 TABLET | Refills: 3 | Status: SHIPPED | OUTPATIENT
Start: 2024-07-22 | End: 2025-07-22

## 2024-07-22 NOTE — PROGRESS NOTES
Susanna Miller MD       Reason for visit : Follow up  HPI   Yared Hensley is a 73 y.o. male who presents to the office for cardiovascular follow up.      Dear Dr. Cristobal,    On July 22, 2024 I saw Yared Hensley in the office for his cardiovascular follow-up.  He is now 73 years old.  He is a very complicated history as you know.  He has a longstanding history of hypertension that has been difficult to treat.  He also has hyperlipidemia, diabetes and BPH.  When he originally came to see me his symptoms were significant for shortness of breath with minimal activity.  He was found to have severe aortic stenosis.  Ultimately had a catheterization.  He had a 60% mid LAD lesion with a normal IFR.  He also was found to have severe aortic stenosis.  Ultimately, in April 2022 he had a transaortic valve replacement.    When I originally met him he was in atrial fibrillation that was completely asymptomatic.  At that time I was very concerned that he had obstructive sleep apnea, and still I am concerned for this diagnosis.  This certainly could be contributing to his atrial fibrillation.  He reportedly had a study that showed only mild disease.    He tells me in general he is doing well.  His blood pressures do bounce around a bit and are generally in the 1 30-1 40 systolic range.  He does very little in the way of physical activity.  He is in fact using a cane because his knees are really bothering him.  He denies chest pain, shortness of breath, lightheadedness, palpitations, or syncope.  He does have chronic peripheral edema.  He wears support stockings for this which do help.    He had a pharmacologic stress test in October 2023.  This was negative for ischemia.    His last cholesterol in May was 83 with an LDL of 20.  As you know he is completely intolerant of statins and has been on Repatha for quite some time.    I have reviewed all of his medications.  Aside from the above-mentioned there are no new medical  or surgical issues.  The rest of his review of systems is completely negative           Problem List:  2023-05: Statin intolerance  2022-04: S/P TAVR (transcatheter aortic valve replacement)  2022-04: Snores  2022-04: Coronary artery disease due to calcified coronary lesion  2022-04: S/P cardiac cath  2022-03: Severe aortic stenosis  2022-03: CHF (congestive heart failure), NYHA class III, acute on   chronic, diastolic (CMS/[facility])  2022-02: Nonrheumatic aortic valve stenosis  2022-01: Atrial fibrillation (CMS/[facility])  2017-12: Pure hypercholesterolemia  2017-12: Morbid obesity (CMS/[facility])  2017-12: Benign prostatic hyperplasia with nocturia  2017-03: Type 2 diabetes mellitus with diabetic neuropathy,   unspecified (CMS/[facility])  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    amLODIPine (NORVASC) 5 mg tablet Take 10 mg by mouth daily.    carvediloL (COREG) 25 mg tablet Take 1 tablet (25 mg total) by mouth 2 (two) times a day with meals.    cholecalciferol, vitamin D3, 50 mcg (2,000 unit) capsule     cloNIDine (CATAPRES-TTS-1) 0.1 mg/24 hr Place 1 patch on the skin once a week. (Patient taking differently: Place 1 patch on the skin once a week. 0.2 patch)    ELIQUIS 5 mg tablet TAKE 1 TABLET BY MOUTH TWICE A DAY    glipiZIDE (GLUCOTROL) 5 mg tablet Take 10 mg by mouth 2 (two) times a day.    hydrALAZINE (APRESOLINE) 100 mg tablet Take 1 tablet (100 mg total) by mouth 3 (three) times a day.    hydrochlorothiazide (MICROZIDE) 12.5 mg capsule Take 12.5 mg by mouth daily.    JANUVIA 100 mg tablet Take 100 mg by mouth daily.    lisinopriL (PRINIVIL) 40 mg tablet Take 40 mg by mouth 2 (two) times a day.    metFORMIN (GLUCOPHAGE) 500 mg tablet 1,000 mg 2 (two) times a day with meals.      REPATHA SYRINGE 140 mg/mL syringe syringe Inject 140 mg under the skin.    tamsulosin  (FLOMAX) 0.4 mg capsule Take 0.4 mg by mouth daily.    vit A/vit C/vit E/zinc/copper (PRESERVISION AREDS ORAL) Take by mouth 2 times daily.    vitamin E 200 unit capsule Take 200 Units by mouth daily.    amoxicillin (AMOXIL) 500 mg capsule Please take 1 tablet (500 mg) 1 hour prior to appt and after appt. (Patient not taking: Reported on 7/22/2024)    aspirin 81 mg enteric coated tablet Take 1 tablet (81 mg total) by mouth daily.    ONETOUCH ULTRA TEST strip     spironolactone (ALDACTONE) 25 mg tablet Take 25 mg by mouth daily.    ZINC ORAL Take 50 mg by mouth as needed.     No current facility-administered medications for this visit.          Allergies:  Patient has no known allergies.    Surgical History:  Past Surgical History:   Procedure Laterality Date    AORTIC VALVE REPLACEMENT      COLONOSCOPY      SKIN BIOPSY      excision of basal cell ca on scalp        Family History:  Family History   Problem Relation Age of Onset    Hypertension Biological Mother     Heart disease Biological Father     Hypertension Biological Father         Social History:  Social History     Socioeconomic History    Marital status:      Spouse name: None    Number of children: 3    Years of education: None    Highest education level: None   Occupational History    Occupation: radiation oncologist   Tobacco Use    Smoking status: Never    Smokeless tobacco: Never   Vaping Use    Vaping Use: Never used   Substance and Sexual Activity    Alcohol use: Yes     Alcohol/week: 2.0 standard drinks of alcohol     Types: 2 Glasses of wine per week     Comment: rarely    Drug use: Never    Sexual activity: Defer   Social History Narrative    Lives at home with wife and 3 daughters, two story home           Review of Systems    The rest of his review of systems is completely negative       Objective   Vitals:    07/22/24 1418   BP: 130/70   BP Location: Left upper arm   Patient Position: Sitting   Pulse: (!) 54   SpO2: 97%   Weight: 121 kg  "(267 lb)   Height: 1.727 m (5' 8\")     Physical Exam  Blood pressure is 130/70.  Heart rate is 54 and regular.  He is comfortable.  Skin is warm and dry.  Conjunctiva are pink.  Affect is appropriate.  No JVD or HJR.  Carotids are unremarkable.  Chest is clear.  Regular rhythm.  Distant heart tones.  He has a soft early systolic ejection murmur at the aortic area.  There were no diastolic sounds.  There were no gallops.  Abdomen is markedly obese and soft with good bowel sounds.  No obvious organomegaly.  No clubbing or cyanosis.  He does have moderate peripheral edema.  I felt no distal pulses.  No rash.       Labs:   Lab Results   Component Value Date    WBC 8.04 03/31/2022    HGB 14.2 03/31/2022    HCT 41.2 03/31/2022     03/31/2022    ALT 27 03/22/2022    AST 22 03/22/2022     03/31/2022    K 3.8 03/31/2022     03/31/2022    CREATININE 0.9 03/31/2022    BUN 18 03/31/2022    CO2 22 03/31/2022    INR 1.1 03/30/2022    HGBA1C 5.3 03/22/2022       ECG :  His EKG G shows controlled atrial fibrillation with nonspecific ST-T wave abnormalities      Today's echo shows normal left ventricular function with a normally functioning TAVR.  There was no aortic insufficiency.  It was technically somewhat limited because of his body habitus        STRESS TEST :  Pharmacologic stress test done in October 2023 was without ischemia       Problem List Items Addressed This Visit       Type 2 diabetes mellitus with diabetic neuropathy, unspecified (CMS/HCC)    Relevant Orders    Hepatic function panel    Lipid panel    Pure hypercholesterolemia    Essential hypertension    Relevant Medications    carvediloL (COREG) 25 mg tablet    Atrial fibrillation (CMS/HCC)    Relevant Medications    carvediloL (COREG) 25 mg tablet    Nonrheumatic aortic valve stenosis    Relevant Medications    carvediloL (COREG) 25 mg tablet    Severe aortic stenosis    Relevant Medications    carvediloL (COREG) 25 mg tablet    Other Relevant " Orders    Transthoracic echo (TTE) complete    CHF (congestive heart failure), NYHA class III, acute on chronic, diastolic (CMS/HCC)    Relevant Medications    carvediloL (COREG) 25 mg tablet    S/P TAVR (transcatheter aortic valve replacement)    Relevant Orders    Transthoracic echo (TTE) complete    Coronary artery disease due to calcified coronary lesion    Relevant Medications    carvediloL (COREG) 25 mg tablet    Other Relevant Orders    NM MYOCARDIAL STRESS REST PHARM    S/P cardiac cath    Statin intolerance     Other Visit Diagnoses       Status post aortic valve replacement    -  Primary    Relevant Orders    [facility] MUSE ECG 12 lead (clinic performed) (Completed)    Transthoracic echo (TTE) complete            Impression:    Clinically Yared is doing well, however, he really does very little in the way of activity.  His blood pressure still continue to be not well-controlled.  I have switched him from metoprolol to Coreg 25 mg twice a day and he will contact me in 2 weeks with an update on his blood pressures.  Hopefully this will help.  He is on multiple agents for his blood pressure, and has been for years.  At this point in time he is not interested in doing a CTA to assess his renal arteries.    If all goes well I will see him again in a year with a pharmacologic stress test prior to assess his LAD lesion.  In addition an echo will be done to assess his TAVR and lipids and LFTs will be done.  His current lipids are excellent.    I thank you for allowing me to participate in the care of your patient.  If I can furnish you with any further details, please do not hesitate to contact me.     Susanna Miller MD  7/22/2024    This document was generated utilizing voice recognition technology. A reasonable attempt at proofreading has been made to minimize errors but please excuse any typographical errors which may be present. Please call with any questions.

## 2024-07-22 NOTE — LETTER
July 22, 2024     Ángel Cristobal MD  [address] [facility] [address]    Patient: Yared Hensley  YOB: 1950  Date of Visit: 7/22/2024      Dear Dr. Cristobal:    Thank you for referring Yared Hensley to me for evaluation. Below are my notes for this consultation.    If you have questions, please do not hesitate to call me. I look forward to following your patient along with you.         Sincerely,        Susanna Miller MD        CC: MD Angela Barry, Susanna GOMEZ MD  7/22/2024  7:22 PM  Sign when Signing Visit       Susanna Miller MD       Reason for visit : Follow up  HPI  Yared Hensley is a 73 y.o. male who presents to the office for cardiovascular follow up.      Dear Dr. Cristobal,    On July 22, 2024 I saw Yared Hensley in the office for his cardiovascular follow-up.  He is now 73 years old.  He is a very complicated history as you know.  He has a longstanding history of hypertension that has been difficult to treat.  He also has hyperlipidemia, diabetes and BPH.  When he originally came to see me his symptoms were significant for shortness of breath with minimal activity.  He was found to have severe aortic stenosis.  Ultimately had a catheterization.  He had a 60% mid LAD lesion with a normal IFR.  He also was found to have severe aortic stenosis.  Ultimately, in April 2022 he had a transaortic valve replacement.    When I originally met him he was in atrial fibrillation that was completely asymptomatic.  At that time I was very concerned that he had obstructive sleep apnea, and still I am concerned for this diagnosis.  This certainly could be contributing to his atrial fibrillation.  He reportedly had a study that showed only mild disease.    He tells me in general he is doing well.  His blood pressures do bounce around a bit and are generally in the 1 30-1 40 systolic range.  He does very little in the way of physical activity.  He is in fact using a cane because his  knees are really bothering him.  He denies chest pain, shortness of breath, lightheadedness, palpitations, or syncope.  He does have chronic peripheral edema.  He wears support stockings for this which do help.    He had a pharmacologic stress test in October 2023.  This was negative for ischemia.    His last cholesterol in May was 83 with an LDL of 20.  As you know he is completely intolerant of statins and has been on Repatha for quite some time.    I have reviewed all of his medications.  Aside from the above-mentioned there are no new medical or surgical issues.  The rest of his review of systems is completely negative           Problem List:  2023-05: Statin intolerance  2022-04: S/P TAVR (transcatheter aortic valve replacement)  2022-04: Snores  2022-04: Coronary artery disease due to calcified coronary lesion  2022-04: S/P cardiac cath  2022-03: Severe aortic stenosis  2022-03: CHF (congestive heart failure), NYHA class III, acute on   chronic, diastolic (CMS/[facility])  2022-02: Nonrheumatic aortic valve stenosis  2022-01: Atrial fibrillation (CMS/[facility])  2017-12: Pure hypercholesterolemia  2017-12: Morbid obesity (CMS/[facility])  2017-12: Benign prostatic hyperplasia with nocturia  2017-03: Type 2 diabetes mellitus with diabetic neuropathy,   unspecified (CMS/[facility])  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   • amLODIPine (NORVASC) 5 mg tablet Take 10 mg by mouth daily.   • carvediloL (COREG) 25 mg tablet Take 1 tablet (25 mg total) by mouth 2 (two) times a day with meals.   • cholecalciferol, vitamin D3, 50 mcg (2,000 unit) capsule    • cloNIDine (CATAPRES-TTS-1) 0.1 mg/24 hr Place 1 patch on the skin once a week. (Patient taking differently: Place 1 patch on the skin once a week. 0.2 patch)   • ELIQUIS 5 mg tablet TAKE 1 TABLET BY MOUTH TWICE A DAY   •  glipiZIDE (GLUCOTROL) 5 mg tablet Take 10 mg by mouth 2 (two) times a day.   • hydrALAZINE (APRESOLINE) 100 mg tablet Take 1 tablet (100 mg total) by mouth 3 (three) times a day.   • hydrochlorothiazide (MICROZIDE) 12.5 mg capsule Take 12.5 mg by mouth daily.   • JANUVIA 100 mg tablet Take 100 mg by mouth daily.   • lisinopriL (PRINIVIL) 40 mg tablet Take 40 mg by mouth 2 (two) times a day.   • metFORMIN (GLUCOPHAGE) 500 mg tablet 1,000 mg 2 (two) times a day with meals.     • REPATHA SYRINGE 140 mg/mL syringe syringe Inject 140 mg under the skin.   • tamsulosin (FLOMAX) 0.4 mg capsule Take 0.4 mg by mouth daily.   • vit A/vit C/vit E/zinc/copper (PRESERVISION AREDS ORAL) Take by mouth 2 times daily.   • vitamin E 200 unit capsule Take 200 Units by mouth daily.   • amoxicillin (AMOXIL) 500 mg capsule Please take 1 tablet (500 mg) 1 hour prior to appt and after appt. (Patient not taking: Reported on 7/22/2024)   • aspirin 81 mg enteric coated tablet Take 1 tablet (81 mg total) by mouth daily.   • ONETOUCH ULTRA TEST strip    • spironolactone (ALDACTONE) 25 mg tablet Take 25 mg by mouth daily.   • ZINC ORAL Take 50 mg by mouth as needed.     No current facility-administered medications for this visit.          Allergies:  Patient has no known allergies.    Surgical History:  Past Surgical History:   Procedure Laterality Date   • AORTIC VALVE REPLACEMENT     • COLONOSCOPY     • SKIN BIOPSY      excision of basal cell ca on scalp        Family History:  Family History   Problem Relation Age of Onset   • Hypertension Biological Mother    • Heart disease Biological Father    • Hypertension Biological Father         Social History:  Social History     Socioeconomic History   • Marital status:      Spouse name: None   • Number of children: 3   • Years of education: None   • Highest education level: None   Occupational History   • Occupation: radiation oncologist   Tobacco Use   • Smoking status: Never   • Smokeless  "tobacco: Never   Vaping Use   • Vaping Use: Never used   Substance and Sexual Activity   • Alcohol use: Yes     Alcohol/week: 2.0 standard drinks of alcohol     Types: 2 Glasses of wine per week     Comment: rarely   • Drug use: Never   • Sexual activity: Defer   Social History Narrative    Lives at home with wife and 3 daughters, two story home           Review of Systems    The rest of his review of systems is completely negative       Objective  Vitals:    07/22/24 1418   BP: 130/70   BP Location: Left upper arm   Patient Position: Sitting   Pulse: (!) 54   SpO2: 97%   Weight: 121 kg (267 lb)   Height: 1.727 m (5' 8\")     Physical Exam  Blood pressure is 130/70.  Heart rate is 54 and regular.  He is comfortable.  Skin is warm and dry.  Conjunctiva are pink.  Affect is appropriate.  No JVD or HJR.  Carotids are unremarkable.  Chest is clear.  Regular rhythm.  Distant heart tones.  He has a soft early systolic ejection murmur at the aortic area.  There were no diastolic sounds.  There were no gallops.  Abdomen is markedly obese and soft with good bowel sounds.  No obvious organomegaly.  No clubbing or cyanosis.  He does have moderate peripheral edema.  I felt no distal pulses.  No rash.       Labs:   Lab Results   Component Value Date    WBC 8.04 03/31/2022    HGB 14.2 03/31/2022    HCT 41.2 03/31/2022     03/31/2022    ALT 27 03/22/2022    AST 22 03/22/2022     03/31/2022    K 3.8 03/31/2022     03/31/2022    CREATININE 0.9 03/31/2022    BUN 18 03/31/2022    CO2 22 03/31/2022    INR 1.1 03/30/2022    HGBA1C 5.3 03/22/2022       ECG :  His EKG G shows controlled atrial fibrillation with nonspecific ST-T wave abnormalities      Today's echo shows normal left ventricular function with a normally functioning TAVR.  There was no aortic insufficiency.  It was technically somewhat limited because of his body habitus        STRESS TEST :  Pharmacologic stress test done in October 2023 was without " ischemia       Problem List Items Addressed This Visit       Type 2 diabetes mellitus with diabetic neuropathy, unspecified (CMS/[facility])    Relevant Orders    Hepatic function panel    Lipid panel    Pure hypercholesterolemia    Essential hypertension    Relevant Medications    carvediloL (COREG) 25 mg tablet    Atrial fibrillation (CMS/HCC)    Relevant Medications    carvediloL (COREG) 25 mg tablet    Nonrheumatic aortic valve stenosis    Relevant Medications    carvediloL (COREG) 25 mg tablet    Severe aortic stenosis    Relevant Medications    carvediloL (COREG) 25 mg tablet    Other Relevant Orders    Transthoracic echo (TTE) complete    CHF (congestive heart failure), NYHA class III, acute on chronic, diastolic (CMS/HCC)    Relevant Medications    carvediloL (COREG) 25 mg tablet    S/P TAVR (transcatheter aortic valve replacement)    Relevant Orders    Transthoracic echo (TTE) complete    Coronary artery disease due to calcified coronary lesion    Relevant Medications    carvediloL (COREG) 25 mg tablet    Other Relevant Orders    NM MYOCARDIAL STRESS REST PHARM    S/P cardiac cath    Statin intolerance     Other Visit Diagnoses       Status post aortic valve replacement    -  Primary    Relevant Orders    [facility] MUSE ECG 12 lead (clinic performed) (Completed)    Transthoracic echo (TTE) complete            Impression:    Clinically Yared is doing well, however, he really does very little in the way of activity.  His blood pressure still continue to be not well-controlled.  I have switched him from metoprolol to Coreg 25 mg twice a day and he will contact me in 2 weeks with an update on his blood pressures.  Hopefully this will help.  He is on multiple agents for his blood pressure, and has been for years.  At this point in time he is not interested in doing a CTA to assess his renal arteries.    If all goes well I will see him again in a year with a pharmacologic stress test prior to assess his LAD lesion.  In  addition an echo will be done to assess his TAVR and lipids and LFTs will be done.  His current lipids are excellent.    I thank you for allowing me to participate in the care of your patient.  If I can furnish you with any further details, please do not hesitate to contact me.     Susanna Miller MD  7/22/2024    This document was generated utilizing voice recognition technology. A reasonable attempt at proofreading has been made to minimize errors but please excuse any typographical errors which may be present. Please call with any questions.

## 2024-08-12 ENCOUNTER — TELEPHONE (OUTPATIENT)
Dept: SCHEDULING | Facility: CLINIC | Age: 74
End: 2024-08-12
Payer: COMMERCIAL

## 2024-08-12 NOTE — TELEPHONE ENCOUNTER
[facility] pharmacy requesting prior Auth fro REPATHA MEDICATION and Script sent over for refilling. Please Advise     [facility]/pharmacy #[id number] - [place], [place] - [address] [place], [place] [id number]  Phone: [phone]  Fax: [phone]

## 2024-08-13 DIAGNOSIS — E78.00 PURE HYPERCHOLESTEROLEMIA: Primary | ICD-10-CM

## 2024-08-13 RX ORDER — EVOLOCUMAB 140 MG/ML
INJECTION, SOLUTION SUBCUTANEOUS
Qty: 6 ML | Refills: 3 | Status: SHIPPED | OUTPATIENT
Start: 2024-08-13

## 2024-08-13 NOTE — TELEPHONE ENCOUNTER
Spoke with patient. Patient aware PA for Repatha has been approved, and an order for refill has been sent to his pharmacy.

## 2024-08-13 NOTE — TELEPHONE ENCOUNTER
Spoke with patient. Confirmed patient has severe intolerance to statins. Chart updated. [facility]

## 2024-08-15 ENCOUNTER — DOCTOR'S OFFICE (OUTPATIENT)
Dept: URBAN - NONMETROPOLITAN AREA CLINIC 1 | Facility: CLINIC | Age: 74
Setting detail: OPHTHALMOLOGY
End: 2024-08-15
Payer: COMMERCIAL

## 2024-08-15 ENCOUNTER — DOCTOR'S OFFICE (OUTPATIENT)
Dept: URBAN - NONMETROPOLITAN AREA CLINIC 1 | Facility: CLINIC | Age: 74
Setting detail: OPHTHALMOLOGY
End: 2024-08-15

## 2024-08-15 DIAGNOSIS — H43.813: ICD-10-CM

## 2024-08-15 DIAGNOSIS — H52.4: ICD-10-CM

## 2024-08-15 DIAGNOSIS — H52.203: ICD-10-CM

## 2024-08-15 DIAGNOSIS — H35.413: ICD-10-CM

## 2024-08-15 DIAGNOSIS — E11.3293: ICD-10-CM

## 2024-08-15 PROCEDURE — 92134 CPTRZ OPH DX IMG PST SGM RTA: CPT | Performed by: OPHTHALMOLOGY

## 2024-08-15 PROCEDURE — 92014 COMPRE OPH EXAM EST PT 1/>: CPT

## 2024-08-15 PROCEDURE — 99213 OFFICE O/P EST LOW 20 MIN: CPT | Performed by: OPHTHALMOLOGY

## 2024-08-15 PROCEDURE — 92015 DETERMINE REFRACTIVE STATE: CPT

## 2024-08-15 ASSESSMENT — LID EXAM ASSESSMENTS
OS_BLEPHARITIS: 3+
OD_BLEPHARITIS: 3+
OS_BLEPHARITIS: 3+
OD_BLEPHARITIS: 3+

## 2024-08-15 ASSESSMENT — CONFRONTATIONAL VISUAL FIELD TEST (CVF)
OS_FINDINGS: FULL
OD_FINDINGS: FULL

## 2024-12-10 NOTE — OP NOTE
[id number]    Yared Hensley ([id number])    Operative Report    Date  3/30/2022     SURGEON:  Surgeon(s) and Role:  Panel 1:     * Woodrow Irwin MD - Primary     * Arron Mayorga PA C  Panel 2:     * Palmer Rosenthal MD - Primary     * Kimberly Calabrese MD - Fellow    SURGICAL STAFF:  Circulator: Juliette Belle RN  Scrub Person: Chani Vazquez Technologist: Nicole Velasquez, RTR  Documenter: Sean Us RN    ANESTHESIA: Local with sedation    PROCEDURE:   Panel 1  TAVR TRANSFEMORAL AORTIC VALVE REPLACEMENT: 96126 (CPT®)  Panel 2  TAVR percutaneous femoral: 92567 (CPT®)  29 Noel        PREOPERATIVE DIAGNOSES:  Patient Active Problem List   Diagnosis   • Type 2 diabetes mellitus with diabetic neuropathy, unspecified (CMS/HCC)   • Toxic myopathy   • Skin lesion   • Pure hypercholesterolemia   • Morbid obesity (CMS/HCC)   • Essential hypertension   • Benign prostatic hyperplasia with nocturia   • Atrial fibrillation (CMS/HCC)   • Nonrheumatic aortic valve stenosis   • Severe aortic stenosis   • CHF (congestive heart failure), NYHA class III, acute on chronic, diastolic (CMS/HCC)     Past Medical History:   Diagnosis Date   • Atrial fibrillation (CMS/HCC) 01/28/2022   • Basal cell carcinoma     scalp   • GERD (gastroesophageal reflux disease)    • Hyperlipidemia    • Hypertension    • TIA (transient ischemic attack)    • Type 2 diabetes mellitus (CMS/HCC)        POSTOPERATIVE DIAGNOSES:  Patient Active Problem List   Diagnosis   • Type 2 diabetes mellitus with diabetic neuropathy, unspecified (CMS/HCC)   • Toxic myopathy   • Skin lesion   • Pure hypercholesterolemia   • Morbid obesity (CMS/HCC)   • Essential hypertension   • Benign prostatic hyperplasia with nocturia   • Atrial fibrillation (CMS/HCC)   • Nonrheumatic aortic valve stenosis   • Severe aortic stenosis   • CHF (congestive heart failure), NYHA class III, acute on chronic, diastolic (CMS/HCC)     Past  Medical History:   Diagnosis Date   • Atrial fibrillation (CMS/HCC) 01/28/2022   • Basal cell carcinoma     scalp   • GERD (gastroesophageal reflux disease)    • Hyperlipidemia    • Hypertension    • TIA (transient ischemic attack)    • Type 2 diabetes mellitus (CMS/HCC)            The right and left femoral arteries were approached using ultrasonic guidance with microneedle 5 Panamanian radial catheters were placed in each femoral vessel a 6 Panamanian sheath was placed in the right femoral vein.  A temporary pacing wire placed in the right femoral vein into the apex of the right ventricle adequate pacing thresholds were obtained.  The pigtail catheter was placed in the right coronary sinus and ascending aortography was performed for proper valve angle.   An 8  Panamanian sheath was then placed on the right femoral artery a JR4 catheter was placed and a straight stiff  Glidewire was used to cross the valve with a JR4 catheter was placed across the valve and the Glidewire was replaced with a extra stiff Amplatz wire.  Over the Amplatz wire the left femoral artery was serially dilated to accommodate the Espino E sheath.  Through the E-sheath a #29 Noel valve was was advanced to the ascending aorta where the valve was placed on the balloon.  Valve was then flexed around the aortic arch and placed across the aortic annulus proper position.  Under rapid ventricular pacing and aortography valve was slowly inflated.  Valve was found to be in good position and was examined with trans-thoracic echo which revealed that the valve was in proper position with no significant paravalvular leak.  The delivery device was removed.  A Manta device was deployed.  The pigtail was removed and replaced with a Rim catheter which was placed across the aortic bifurcation.  Right ileo-femoral arteriography was performed which revealed adequate flow with no significant stenosis or extravasation noted.    Patient was returned to the recovery room.    Ulceration: No Counseling For Stage Iia Melanomas: I reviewed the factors which determine melanoma stage. For Stage IIA the 5-year survival rate is around 81%. The 10-year survival is around 67%. I recommended at least monthly skin self-examinations and close dermatology follow-up. Mitotic Rate: Less than 1/mm2 Counseling For Stage Ia Melanomas: I reviewed the factors which determine melanoma stage. For Stage IA the 5-year survival rate is around 97%. The 10-year survival is around 95%. I recommended at least monthly skin self-examinations and close dermatology follow-up. Counseling For Stage Iiid Melanomas: I reviewed the factors which determine melanoma stage. For Stage IIID the 5-year survival rate is around 32%. The 10-year survival is around 24%. I recommended at least monthly skin self-examinations and close dermatology follow-up. Lymph Node Evaluation (Clinical And Microscopic): No regional lymph nodes available for evaluation Counseling For Stage Iiib Melanomas: I reviewed the factors which determine melanoma stage. For Stage IIIB the 5-year survival rate is around 59%. The 10-year survival is around 43%. I recommended at least monthly skin self-examinations and close dermatology follow-up. Counseling For Stage Iiic Melanomas: I reviewed the factors which determine melanoma stage. For Stage IIIC the 5-year survival rate is around 40%. The 10-year survival is around 24%. I recommended at least monthly skin self-examinations and close dermatology follow-up. Are Regional Lymph Nodes Available For Evaluation: Yes Counseling For Stage Iic Melanomas: I reviewed the factors which determine melanoma stage. For Stage IIC the 5-year survival rate is around 53%. The 10-year survival is around 40%. I recommended at least monthly skin self-examinations and close dermatology follow-up. Counseling For Stage Iiia Melanomas: I reviewed the factors which determine melanoma stage. For Stage IIIA the 5-year survival rate is around 78%. The 10-year survival is around 68%. I recommended at least monthly skin self-examinations and close dermatology follow-up. Counseling For Stage Iib Melanomas: I reviewed the factors which determine melanoma stage. For Stage IIB the 5-year survival rate is around 70%. The 10-year survival is around 57%. I recommended at least monthly skin self-examinations and close dermatology follow-up. Breslow Depth In Mm (Leave At 0 For In Situ): 0 Counseling For Stage Iv Melanomas: I reviewed the factors which determine melanoma stage. For Stage IV the 5-year survival rate is around 15% to 20%. The 10-year survival is about 10% to 15%. I recommended at least monthly skin self-examinations and close dermatology follow-up. Staging Type: initial staging Counseling For Stage 0 Melanomas: I reviewed the factors which determine melanoma stage. For Stage 0 the 5-year survival rate is 100%. I recommended at least monthly skin self-examinations and close dermatology follow-up. Distant Metastases: No distant metastases Who Was Counseled?: patient Counseling For Stage Ib Melanomas: I reviewed the factors which determine melanoma stage. For Stage IB the 5-year survival rate is around 92%. The 10-year survival is around 86%. I recommended at least monthly skin self-examinations and close dermatology follow-up. Detail Level: Detailed

## 2025-03-06 ENCOUNTER — DOCTOR'S OFFICE (OUTPATIENT)
Dept: URBAN - NONMETROPOLITAN AREA CLINIC 1 | Facility: CLINIC | Age: 75
Setting detail: OPHTHALMOLOGY
End: 2025-03-06
Payer: COMMERCIAL

## 2025-03-06 DIAGNOSIS — Z83.511: ICD-10-CM

## 2025-03-06 DIAGNOSIS — H43.813: ICD-10-CM

## 2025-03-06 DIAGNOSIS — H35.413: ICD-10-CM

## 2025-03-06 DIAGNOSIS — H25.13: ICD-10-CM

## 2025-03-06 DIAGNOSIS — E11.3293: ICD-10-CM

## 2025-03-06 PROCEDURE — 92235 FLUORESCEIN ANGRPH MLTIFRAME: CPT | Performed by: OPHTHALMOLOGY

## 2025-03-06 PROCEDURE — 92250 FUNDUS PHOTOGRAPHY W/I&R: CPT | Performed by: OPHTHALMOLOGY

## 2025-03-06 PROCEDURE — 99214 OFFICE O/P EST MOD 30 MIN: CPT | Performed by: OPHTHALMOLOGY

## 2025-03-06 ASSESSMENT — KERATOMETRY
OD_K2POWER_DIOPTERS: 42.25
OD_K1POWER_DIOPTERS: 42.00
OD_AXISANGLE_DEGREES: 073
OS_K1POWER_DIOPTERS: 42.25
OS_AXISANGLE_DEGREES: 089
OS_K2POWER_DIOPTERS: 42.75

## 2025-03-06 ASSESSMENT — REFRACTION_MANIFEST
OS_AXIS: 090
OS_CYLINDER: -1.25
OD_VA1: 20/20
OS_SPHERE: PL
OD_ADD: +2.75
OS_VA2: 20/20
OU_VA: 20/20
OS_ADD: +2.75
OD_SPHERE: PL
OD_CYLINDER: -1.50
OD_AXIS: 095
OS_VA1: 20/20
OD_VA2: 20/20

## 2025-03-06 ASSESSMENT — REFRACTION_CURRENTRX
OS_VPRISM_DIRECTION: SV
OD_SPHERE: PLANO
OD_CYLINDER: -1.00
OD_AXIS: 088
OD_VPRISM_DIRECTION: SV
OS_CYLINDER: -1.00
OD_OVR_VA: 20/
OS_SPHERE: PLANO
OS_AXIS: 086
OS_OVR_VA: 20/

## 2025-03-06 ASSESSMENT — CONFRONTATIONAL VISUAL FIELD TEST (CVF)
OD_FINDINGS: FULL
OS_FINDINGS: FULL

## 2025-03-06 ASSESSMENT — REFRACTION_AUTOREFRACTION
OS_CYLINDER: -1.25
OD_SPHERE: +1.25
OS_AXIS: 91
OD_CYLINDER: -2.25
OS_SPHERE: 0.00
OD_AXIS: 99

## 2025-03-06 ASSESSMENT — VISUAL ACUITY
OD_BCVA: 20/25
OS_BCVA: 20/30-2

## 2025-03-06 ASSESSMENT — LID EXAM ASSESSMENTS
OS_BLEPHARITIS: 3+
OD_BLEPHARITIS: 3+

## 2025-03-25 ENCOUNTER — TELEPHONE (OUTPATIENT)
Dept: CARDIOLOGY | Facility: CLINIC | Age: 75
End: 2025-03-25
Payer: COMMERCIAL

## 2025-03-25 NOTE — TELEPHONE ENCOUNTER
On March 25, 2025 I spoke with Elbert Hensley.  He was hospitalized yesterday because of chest discomfort.  His blood pressure was extremely high.  His troponins were negative and his EKG reportedly was unchanged.  He does have a known 60% LAD lesion.  Because of this he will be having a pharmacologic stress test in the near future

## 2025-03-26 ENCOUNTER — TELEPHONE (OUTPATIENT)
Dept: CARDIOLOGY | Facility: CLINIC | Age: 75
End: 2025-03-26

## 2025-03-26 DIAGNOSIS — I35.0 NONRHEUMATIC AORTIC VALVE STENOSIS: Primary | ICD-10-CM

## 2025-03-26 DIAGNOSIS — I25.84 CORONARY ARTERY DISEASE DUE TO CALCIFIED CORONARY LESION: ICD-10-CM

## 2025-03-26 DIAGNOSIS — I50.33 CHF (CONGESTIVE HEART FAILURE), NYHA CLASS III, ACUTE ON CHRONIC, DIASTOLIC (CMS/HCC): ICD-10-CM

## 2025-03-26 DIAGNOSIS — I25.10 CORONARY ARTERY DISEASE DUE TO CALCIFIED CORONARY LESION: ICD-10-CM

## 2025-03-26 DIAGNOSIS — Z95.2 STATUS POST AORTIC VALVE REPLACEMENT: ICD-10-CM

## 2025-03-26 DIAGNOSIS — Z95.2 S/P TAVR (TRANSCATHETER AORTIC VALVE REPLACEMENT): ICD-10-CM

## 2025-03-26 DIAGNOSIS — I48.21 PERMANENT ATRIAL FIBRILLATION (CMS/HCC): ICD-10-CM

## 2025-03-26 NOTE — TELEPHONE ENCOUNTER
----- Message from Ba Tan sent at 3/26/2025 11:03 AM EDT -----  Regarding: RE: phaarmacologic Stress  Orders are pending.Thanks  ----- Message -----  From: Susanna Miller MD  Sent: 3/25/2025   6:05 PM EDT  To: Ba Tan MA; Sofya Taylor  Subject: phaarmacologic Stress                            He needs a phamacologic stress at [facility] in [place] for Chest pain and known CAD

## 2025-06-06 ENCOUNTER — OFFICE VISIT (OUTPATIENT)
Dept: CARDIOLOGY | Facility: CLINIC | Age: 75
End: 2025-06-06
Payer: COMMERCIAL

## 2025-06-06 VITALS
HEIGHT: 68 IN | SYSTOLIC BLOOD PRESSURE: 130 MMHG | OXYGEN SATURATION: 97 % | HEART RATE: 86 BPM | BODY MASS INDEX: 39.71 KG/M2 | WEIGHT: 262 LBS | DIASTOLIC BLOOD PRESSURE: 80 MMHG

## 2025-06-06 DIAGNOSIS — Z95.2 S/P TAVR (TRANSCATHETER AORTIC VALVE REPLACEMENT): ICD-10-CM

## 2025-06-06 DIAGNOSIS — I25.84 CORONARY ARTERY DISEASE DUE TO CALCIFIED CORONARY LESION: ICD-10-CM

## 2025-06-06 DIAGNOSIS — I10 ESSENTIAL HYPERTENSION: Primary | ICD-10-CM

## 2025-06-06 DIAGNOSIS — E66.01 MORBID OBESITY (CMS/HCC): ICD-10-CM

## 2025-06-06 DIAGNOSIS — E11.40 TYPE 2 DIABETES MELLITUS WITH DIABETIC NEUROPATHY, WITHOUT LONG-TERM CURRENT USE OF INSULIN (CMS/HCC): ICD-10-CM

## 2025-06-06 DIAGNOSIS — I35.0 SEVERE AORTIC STENOSIS: ICD-10-CM

## 2025-06-06 DIAGNOSIS — I25.10 CORONARY ARTERY DISEASE DUE TO CALCIFIED CORONARY LESION: ICD-10-CM

## 2025-06-06 DIAGNOSIS — R06.83 SNORES: ICD-10-CM

## 2025-06-06 DIAGNOSIS — Z78.9 STATIN INTOLERANCE: ICD-10-CM

## 2025-06-06 DIAGNOSIS — R06.02 SHORTNESS OF BREATH: ICD-10-CM

## 2025-06-06 DIAGNOSIS — E78.00 PURE HYPERCHOLESTEROLEMIA: ICD-10-CM

## 2025-06-06 DIAGNOSIS — Z98.890 S/P CARDIAC CATH: ICD-10-CM

## 2025-06-06 DIAGNOSIS — I48.21 PERMANENT ATRIAL FIBRILLATION (CMS/HCC): ICD-10-CM

## 2025-06-06 LAB
QRS DURATION: 82
QT INTERVAL: 388
QTC CALCULATION(BAZETT): 439
R AXIS: -22
T WAVE AXIS: 26
VENTRICULAR RATE: 77

## 2025-06-06 PROCEDURE — 99214 OFFICE O/P EST MOD 30 MIN: CPT | Performed by: INTERNAL MEDICINE

## 2025-06-06 PROCEDURE — 93000 ELECTROCARDIOGRAM COMPLETE: CPT | Performed by: INTERNAL MEDICINE

## 2025-06-06 PROCEDURE — 3008F BODY MASS INDEX DOCD: CPT | Performed by: INTERNAL MEDICINE

## 2025-06-06 RX ORDER — FUROSEMIDE 20 MG/1
20 TABLET ORAL AS NEEDED
COMMUNITY

## 2025-06-06 RX ORDER — SEMAGLUTIDE 0.68 MG/ML
0.25 INJECTION, SOLUTION SUBCUTANEOUS
COMMUNITY
Start: 2025-06-02

## 2025-06-06 RX ORDER — ZOLPIDEM TARTRATE 5 MG/1
5 TABLET ORAL DAILY
COMMUNITY
Start: 2024-09-14

## 2025-06-06 NOTE — LETTER
"June 7, 2025     Ángel Cristobal MD  [address] [facility] [address]    Patient: Yared Hensley  YOB: 1950  Date of Visit: 6/6/2025      Dear Dr. Cristobal:    Thank you for referring Yared Hensley to me for evaluation. Below are my notes for this consultation.    If you have questions, please do not hesitate to call me. I look forward to following your patient along with you.         Sincerely,        Susanna Miller MD        CC: No Recipients    Susanna Miller MD  6/7/2025  5:29 PM  Sign when Signing Visit       Susanna Miller MD       Reason for visit : Follow up  HPI  Yared Hensley is a 74 y.o. male who presents to the office for cardiovascular follow up.      Dear Ángel,    On June 6, 2025 I saw Yared Hensley in the office at his request.  He is now 74 years old.  His story is very complicated.  He has a longstanding history of hypertension that has been very difficult to treat.  He has also had hyperlipidemia, diabetes and BPH.  He originally came to see me with symptoms of shortness of breath with minimal activity.  He was found to have severe aortic stenosis.  Ultimately catheterization was done and he did have a 60% mid LAD lesion with normal IFR.  His aortic stenosis was severe and ultimately in April 2022 he had a transaortic valve replacement    When I first met him he was in atrial fibrillation that was completely asymptomatic.  I was very concerned at the time that he may have had obstructive sleep apnea.    He now comes to me with complaints of increasing shortness of breath.  He was hospitalized in March with a variety of symptoms and his blood pressures were very high.  In March it was my advice that a pharmacologic stress test get done.  Unfortunately this has not been done.    He continues to have a sense of general malaise when his blood pressure goes up and he does have some left arm discomfort.  He tells me he is just \"not feeling right.  He is completely " inactive related to his obesity and orthopedic issues.  He denies chest discomfort, palpitations, or syncope.    He is scheduled to have a lesion from his left nipple removed as well as a lesion from his head and ear on June 9.    His most recent cholesterol done in March was 95 with an LDL of 28.  His triglycerides were 177    I have reviewed all of his medications.  Aside from the above mentioned there are no new medical or surgical issues.  The rest of his review of systems is negative       Problem List:  2023-05: Statin intolerance  2022-04: S/P TAVR (transcatheter aortic valve replacement)  2022-04: Snores  2022-04: Coronary artery disease due to calcified coronary lesion  2022-04: S/P cardiac cath  2022-03: Severe aortic stenosis  2022-03: CHF (congestive heart failure), NYHA class III, acute on   chronic, diastolic (CMS/[facility])  2022-02: Nonrheumatic aortic valve stenosis  2022-01: Atrial fibrillation (CMS/[facility])  2017-12: Pure hypercholesterolemia  2017-12: Morbid obesity (CMS/[facility])  2017-12: Benign prostatic hyperplasia with nocturia  2017-03: Type 2 diabetes mellitus with diabetic neuropathy,   unspecified (CMS/[facility])  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  2016-06: Skin lesion  2016-06: Essential hypertension           Current Medications[1]       Allergies:  Statins-hmg-coa reductase inhibitors    Surgical History:  Past Surgical History   Procedure Laterality Date   • Aortic valve replacement     • Colonoscopy     • Coronary angiography N/A 2/25/2022    Performed by Roge Pedroza DO at [facility] [place] [place]ATH/[place]   • Left heart cath N/A 2/25/2022    Performed by Roge Pedroza DO at [facility] [place] [facility]ATH/[facility]   • Skin biopsy      excision of basal cell ca on scalp   • TAVR percutaneous femoral N/A 3/30/2022    Performed by Palmer Rosenthal MD at [facility] [place]   • TAVR TRANSFEMORAL AORTIC VALVE REPLACEMENT N/A 3/30/2022    Performed by Woodrow Irwin MD at [facility] [place]        Family History:  Family History   Problem Relation Name Age of Onset   •  "Hypertension Biological Mother     • Heart disease Biological Father     • Hypertension Biological Father          Social History:  Social History     Socioeconomic History   • Marital status:      Spouse name: None   • Number of children: 3   • Years of education: None   • Highest education level: None   Occupational History   • Occupation: radiation oncologist   Tobacco Use   • Smoking status: Never   • Smokeless tobacco: Never   Vaping Use   • Vaping status: Never Used   Substance and Sexual Activity   • Alcohol use: Yes     Alcohol/week: 2.0 standard drinks of alcohol     Types: 2 Glasses of wine per week     Comment: rarely   • Drug use: Never   • Sexual activity: Defer   Social History Narrative    Lives at home with wife and 3 daughters, two story home           Review of Systems    The rest of his review of systems is negative       Objective  Vitals:    06/06/25 1359 06/06/25 1405   BP: (!) 160/80 130/80   Pulse: 86    SpO2: 97%    Weight: 119 kg (262 lb)    Height: 1.727 m (5' 8\")      Physical Exam  Blood pressure is 160/80.  Heart rate is 86 and regular  He is comfortable.  Skin is warm and dry.  Conjunctiva are pink.  Affect is appropriate.  No JVD or HJR.  Carotids are unremarkable.  Chest is clear.  Regular rhythm.  Distant heart tones.  Is a soft early systolic murmur at the aortic area.  No diastolic sounds.  No gallops.  Abdomen is markedly obese and soft with good bowel sounds.  No obvious organomegaly.  Clubbing or cyanosis.  Moderate peripheral edema.  I felt no distal pulses.  No rash       Labs:   Lab Results   Component Value Date    WBC 8.04 03/31/2022    HGB 14.2 03/31/2022    HCT 41.2 03/31/2022     03/31/2022    ALT 27 03/22/2022    AST 22 03/22/2022     03/31/2022    K 3.8 03/31/2022     03/31/2022    CREATININE 0.9 03/31/2022    BUN 18 03/31/2022    CO2 22 03/31/2022    INR 1.1 03/30/2022    HGBA1C 5.3 03/22/2022       ECG :    His EKG shows sinus rhythm with Q " waves in leads III and F.  This is similar to that from July 2024 however at that time there were Q's just in lead III.    Cardiac Imaging    TRANSTHORACIC ECHO (TTE) COMPLETE 07/22/2024    Interpretation Summary  1.  Technically somewhat limited study.  All segments were not adequately seen.  2.  Grossly normal left ventricular function with an estimated ejection fraction of 60 to 65%.  3.  Mild left ventricular hypertrophy that is concentric.  4.  Patient is in atrial fibrillation.  5.  Mild biatrial enlargement.  6.  Mildly dilated right ventricle with grossly normal systolic function.  7.  Evidence of a TAVR.  The leaflets were never clearly seen.  By Doppler examination the mean gradient was 14 mmHg and calculated valve area was 1.71 cm².  No aortic insufficiency.  8.  Mild tricuspid insufficiency.  Pulmonary pressures were minimally elevated at 37 mmHg.  9.  Moderate mitral annular calcification with mild mitral regurgitation.  10.  Thickened aortic root.  11.  No significant change from the previous study             Problem List Items Addressed This Visit       Type 2 diabetes mellitus with diabetic neuropathy, unspecified (CMS/HCC)    Relevant Medications    OZEMPIC 0.25 mg or 0.5 mg (2 mg/3 mL) subcutaneous injection    Pure hypercholesterolemia    Morbid obesity (CMS/HCC)    Essential hypertension - Primary    Relevant Medications    furosemide (LASIX) 20 mg tablet    Other Relevant Orders    [facility] MUSE ECG 12 lead (clinic performed) (Completed)    Atrial fibrillation (CMS/HCC)    Severe aortic stenosis    S/P TAVR (transcatheter aortic valve replacement)    Snores    Coronary artery disease due to calcified coronary lesion    S/P cardiac cath    Statin intolerance     Other Visit Diagnoses         Shortness of breath        Relevant Orders    Transthoracic echo (TTE) complete    Basic metabolic panel    [facility] MY[facility]ARDIAL STRESS REST PHARM            Impression:    Elbert's blood pressures continue to be a  tremendous problem.  I am going to restart Aldactone since his last potassium was quite normal.  He will call me in 2 weeks with an update on his blood pressures.  A BMP will be done in a week.  He understands the absolute need to get a sleep study.  I am very concerned that sleep apnea is contributing to many of his issues.  He has agreed to this.    He will also get a pharmacologic stress test to rule out ischemia.  An echocardiogram is pending.  His follow-up will depend on what we find.    I thank you for allowing me to participate in the care of your patient.  If I can furnish you with any further details, please do not hesitate to contact me.     Susanna Miller MD  6/7/2025    This document was generated utilizing voice recognition technology. A reasonable attempt at proofreading has been made to minimize errors but please excuse any typographical errors which may be present. Please call with any questions.           [1]  Current Outpatient Medications   Medication Sig   • alfuzosin (UROXATRAL) 10 mg 24 hr tablet Take 10 mg by mouth daily.   • amLODIPine (NORVASC) 5 mg tablet Take 10 mg by mouth daily.   • amoxicillin (AMOXIL) 500 mg capsule Please take 1 tablet (500 mg) 1 hour prior to appt and after appt.   • aspirin 81 mg enteric coated tablet Take 1 tablet (81 mg total) by mouth daily.   • carvediloL (COREG) 25 mg tablet Take 1 tablet (25 mg total) by mouth 2 (two) times a day with meals.   • cholecalciferol, vitamin D3, 50 mcg (2,000 unit) capsule    • cloNIDine (CATAPRES-TTS-1) 0.1 mg/24 hr Place 1 patch on the skin once a week. (Patient taking differently: Place 1 patch on the skin once a week. 0.2 patch)   • ELIQUIS 5 mg tablet TAKE 1 TABLET BY MOUTH TWICE A DAY   • furosemide (LASIX) 20 mg tablet Take 20 mg by mouth as needed.   • glipiZIDE (GLUCOTROL) 5 mg tablet Take 10 mg by mouth 2 (two) times a day.   • hydrALAZINE (APRESOLINE) 100 mg tablet TAKE 1 TABLET BY MOUTH 3 TIMES A DAY.   •  hydrochlorothiazide (MICROZIDE) 12.5 mg capsule Take 12.5 mg by mouth daily.   • JANUVIA 100 mg tablet Take 100 mg by mouth daily.   • lisinopriL (PRINIVIL) 40 mg tablet Take 40 mg by mouth 2 (two) times a day.   • metFORMIN (GLUCOPHAGE) 500 mg tablet 1,000 mg 2 (two) times a day with meals.     • ONETOUCH ULTRA TEST strip    • OZEMPIC 0.25 mg or 0.5 mg (2 mg/3 mL) subcutaneous injection Inject 0.25 mg under the skin every (seven) 7 days.   • REPATHA SYRINGE 140 mg/mL syringe syringe Inject 140 mg under the skin.   • tamsulosin (FLOMAX) 0.4 mg capsule Take 0.4 mg by mouth daily.   • vit A/vit C/vit E/zinc/copper (PRESERVISION AREDS ORAL) Take by mouth 2 times daily.   • vitamin E 200 unit capsule Take 200 Units by mouth daily.   • ZINC ORAL Take 50 mg by mouth as needed.   • zolpidem (AMBIEN) 5 mg tablet Take 5 mg by mouth daily.     No current facility-administered medications for this visit.

## 2025-06-06 NOTE — PROGRESS NOTES
"     Susanna Miller MD       Reason for visit : Follow up  HPI   Yared Hensley is a 74 y.o. male who presents to the office for cardiovascular follow up.      Dear Ángel,    On June 6, 2025 I saw Yared Hensley in the office at his request.  He is now 74 years old.  His story is very complicated.  He has a longstanding history of hypertension that has been very difficult to treat.  He has also had hyperlipidemia, diabetes and BPH.  He originally came to see me with symptoms of shortness of breath with minimal activity.  He was found to have severe aortic stenosis.  Ultimately catheterization was done and he did have a 60% mid LAD lesion with normal IFR.  His aortic stenosis was severe and ultimately in April 2022 he had a transaortic valve replacement    When I first met him he was in atrial fibrillation that was completely asymptomatic.  I was very concerned at the time that he may have had obstructive sleep apnea.    He now comes to me with complaints of increasing shortness of breath.  He was hospitalized in March with a variety of symptoms and his blood pressures were very high.  In March it was my advice that a pharmacologic stress test get done.  Unfortunately this has not been done.    He continues to have a sense of general malaise when his blood pressure goes up and he does have some left arm discomfort.  He tells me he is just \"not feeling right.  He is completely inactive related to his obesity and orthopedic issues.  He denies chest discomfort, palpitations, or syncope.    He is scheduled to have a lesion from his left nipple removed as well as a lesion from his head and ear on June 9.    His most recent cholesterol done in March was 95 with an LDL of 28.  His triglycerides were 177    I have reviewed all of his medications.  Aside from the above mentioned there are no new medical or surgical issues.  The rest of his review of systems is negative       Problem List:  2023-05: Statin " intolerance  2022-04: S/P TAVR (transcatheter aortic valve replacement)  2022-04: Snores  2022-04: Coronary artery disease due to calcified coronary lesion  2022-04: S/P cardiac cath  2022-03: Severe aortic stenosis  2022-03: CHF (congestive heart failure), NYHA class III, acute on   chronic, diastolic (CMS/HCC)  2022-02: Nonrheumatic aortic valve stenosis  2022-01: Atrial fibrillation (CMS/HCC)  2017-12: Pure hypercholesterolemia  2017-12: Morbid obesity (CMS/HCC)  2017-12: Benign prostatic hyperplasia with nocturia  2017-03: Type 2 diabetes mellitus with diabetic neuropathy,   unspecified (CMS/HCC)  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  2016-06: Skin lesion  2016-06: Essential hypertension           Current Medications[1]       Allergies:  Statins-hmg-coa reductase inhibitors    Surgical History:  Past Surgical History   Procedure Laterality Date    Aortic valve replacement      Colonoscopy      Coronary angiography N/A 2/25/2022    Performed by Roge Pedroza DO at [facility]    Left heart cath N/A 2/25/2022    Performed by Roge Pedroza DO at [facility]    Skin biopsy      excision of basal cell ca on scalp    TAVR percutaneous femoral N/A 3/30/2022    Performed by Palmer Rosenthal MD at [facility] [place]    TAVR TRANSFEMORAL AORTIC VALVE REPLACEMENT N/A 3/30/2022    Performed by Woodrow Irwin MD at [facility] [place]        Family History:  Family History   Problem Relation Name Age of Onset    Hypertension Biological Mother      Heart disease Biological Father      Hypertension Biological Father          Social History:  Social History     Socioeconomic History    Marital status:      Spouse name: None    Number of children: 3    Years of education: None    Highest education level: None   Occupational History    Occupation: radiation oncologist   Tobacco Use    Smoking status: Never    Smokeless tobacco: Never   Vaping Use    Vaping status: Never Used   Substance and Sexual Activity    Alcohol use: Yes      "Alcohol/week: 2.0 standard drinks of alcohol     Types: 2 Glasses of wine per week     Comment: rarely    Drug use: Never    Sexual activity: Defer   Social History Narrative    Lives at home with wife and 3 daughters, two story home           Review of Systems    The rest of his review of systems is negative       Objective   Vitals:    06/06/25 1359 06/06/25 1405   BP: (!) 160/80 130/80   Pulse: 86    SpO2: 97%    Weight: 119 kg (262 lb)    Height: 1.727 m (5' 8\")      Physical Exam  Blood pressure is 160/80.  Heart rate is 86 and regular  He is comfortable.  Skin is warm and dry.  Conjunctiva are pink.  Affect is appropriate.  No JVD or HJR.  Carotids are unremarkable.  Chest is clear.  Regular rhythm.  Distant heart tones.  Is a soft early systolic murmur at the aortic area.  No diastolic sounds.  No gallops.  Abdomen is markedly obese and soft with good bowel sounds.  No obvious organomegaly.  Clubbing or cyanosis.  Moderate peripheral edema.  I felt no distal pulses.  No rash       Labs:   Lab Results   Component Value Date    WBC 8.04 03/31/2022    HGB 14.2 03/31/2022    HCT 41.2 03/31/2022     03/31/2022    ALT 27 03/22/2022    AST 22 03/22/2022     03/31/2022    K 3.8 03/31/2022     03/31/2022    CREATININE 0.9 03/31/2022    BUN 18 03/31/2022    CO2 22 03/31/2022    INR 1.1 03/30/2022    HGBA1C 5.3 03/22/2022       ECG :    His EKG shows sinus rhythm with Q waves in leads III and F.  This is similar to that from July 2024 however at that time there were Q's just in lead III.    Cardiac Imaging    TRANSTHORACIC ECHO (TTE) COMPLETE 07/22/2024    Interpretation Summary  1.  Technically somewhat limited study.  All segments were not adequately seen.  2.  Grossly normal left ventricular function with an estimated ejection fraction of 60 to 65%.  3.  Mild left ventricular hypertrophy that is concentric.  4.  Patient is in atrial fibrillation.  5.  Mild biatrial enlargement.  6.  Mildly dilated " right ventricle with grossly normal systolic function.  7.  Evidence of a TAVR.  The leaflets were never clearly seen.  By Doppler examination the mean gradient was 14 mmHg and calculated valve area was 1.71 cm².  No aortic insufficiency.  8.  Mild tricuspid insufficiency.  Pulmonary pressures were minimally elevated at 37 mmHg.  9.  Moderate mitral annular calcification with mild mitral regurgitation.  10.  Thickened aortic root.  11.  No significant change from the previous study             Problem List Items Addressed This Visit       Type 2 diabetes mellitus with diabetic neuropathy, unspecified (CMS/[facility])    Relevant Medications    OZEMPIC 0.25 mg or 0.5 mg (2 mg/3 mL) subcutaneous injection    Pure hypercholesterolemia    Morbid obesity (CMS/HCC)    Essential hypertension - Primary    Relevant Medications    furosemide (LASIX) 20 mg tablet    Other Relevant Orders    [facility] MUSE ECG 12 lead (clinic performed) (Completed)    Atrial fibrillation (CMS/HCC)    Severe aortic stenosis    S/P TAVR (transcatheter aortic valve replacement)    Snores    Coronary artery disease due to calcified coronary lesion    S/P cardiac cath    Statin intolerance     Other Visit Diagnoses         Shortness of breath        Relevant Orders    Transthoracic echo (TTE) complete    Basic metabolic panel    [facility] MYOCARDIAL STRESS REST PHARM            Impression:    Elbert's blood pressures continue to be a tremendous problem.  I am going to restart Aldactone since his last potassium was quite normal.  He will call me in 2 weeks with an update on his blood pressures.  A BMP will be done in a week.  He understands the absolute need to get a sleep study.  I am very concerned that sleep apnea is contributing to many of his issues.  He has agreed to this.    He will also get a pharmacologic stress test to rule out ischemia.  An echocardiogram is pending.  His follow-up will depend on what we find.    I thank you for allowing me to participate  in the care of your patient.  If I can furnish you with any further details, please do not hesitate to contact me.     Susanna Miller MD  6/7/2025    This document was generated utilizing voice recognition technology. A reasonable attempt at proofreading has been made to minimize errors but please excuse any typographical errors which may be present. Please call with any questions.         [1]   Current Outpatient Medications   Medication Sig    alfuzosin (UROXATRAL) 10 mg 24 hr tablet Take 10 mg by mouth daily.    amLODIPine (NORVASC) 5 mg tablet Take 10 mg by mouth daily.    amoxicillin (AMOXIL) 500 mg capsule Please take 1 tablet (500 mg) 1 hour prior to appt and after appt.    aspirin 81 mg enteric coated tablet Take 1 tablet (81 mg total) by mouth daily.    carvediloL (COREG) 25 mg tablet Take 1 tablet (25 mg total) by mouth 2 (two) times a day with meals.    cholecalciferol, vitamin D3, 50 mcg (2,000 unit) capsule     cloNIDine (CATAPRES-TTS-1) 0.1 mg/24 hr Place 1 patch on the skin once a week. (Patient taking differently: Place 1 patch on the skin once a week. 0.2 patch)    ELIQUIS 5 mg tablet TAKE 1 TABLET BY MOUTH TWICE A DAY    furosemide (LASIX) 20 mg tablet Take 20 mg by mouth as needed.    glipiZIDE (GLUCOTROL) 5 mg tablet Take 10 mg by mouth 2 (two) times a day.    hydrALAZINE (APRESOLINE) 100 mg tablet TAKE 1 TABLET BY MOUTH 3 TIMES A DAY.    hydrochlorothiazide (MICROZIDE) 12.5 mg capsule Take 12.5 mg by mouth daily.    JANUVIA 100 mg tablet Take 100 mg by mouth daily.    lisinopriL (PRINIVIL) 40 mg tablet Take 40 mg by mouth 2 (two) times a day.    metFORMIN (GLUCOPHAGE) 500 mg tablet 1,000 mg 2 (two) times a day with meals.      ONETOUCH ULTRA TEST strip     OZEMPIC 0.25 mg or 0.5 mg (2 mg/3 mL) subcutaneous injection Inject 0.25 mg under the skin every (seven) 7 days.    REPATHA SYRINGE 140 mg/mL syringe syringe Inject 140 mg under the skin.    tamsulosin (FLOMAX) 0.4 mg capsule Take 0.4 mg  by mouth daily.    vit A/vit C/vit E/zinc/copper (PRESERVISION AREDS ORAL) Take by mouth 2 times daily.    vitamin E 200 unit capsule Take 200 Units by mouth daily.    ZINC ORAL Take 50 mg by mouth as needed.    zolpidem (AMBIEN) 5 mg tablet Take 5 mg by mouth daily.     No current facility-administered medications for this visit.

## 2025-06-11 ENCOUNTER — TELEPHONE (OUTPATIENT)
Dept: SCHEDULING | Facility: CLINIC | Age: 75
End: 2025-06-11

## 2025-06-11 NOTE — TELEPHONE ENCOUNTER
Alycia practice manager with pt practice calling on pt behalf to ask for office to call asap to discuss fax and new pt that will be coming to office    Alycia can be reached at .[phone]

## 2025-06-25 ENCOUNTER — TELEPHONE (OUTPATIENT)
Dept: SCHEDULING | Facility: CLINIC | Age: 75
End: 2025-06-25
Payer: COMMERCIAL

## 2025-06-25 NOTE — TELEPHONE ENCOUNTER
Patient Name: Yared Hensley    Caller name: Kami     Relationship: Jevon     Reason for call: Asked for the [facility] MYOC[facility] STRESS REST PHARM Auth to be faxed to their office at fax number [phone]    Callback number: [phone]

## 2025-06-25 NOTE — TELEPHONE ENCOUNTER
Precert Auth Scheduling:     Name: Yared Hensley    Auth Location: [place]    Auth Number: [id number]    Valid dates: 6/25/2025 - 8/24/2025    Procedure: [facility] MYOCARDIAL STRESS REST PHARM    Scheduling notes: Please call pt thank you!

## 2025-06-25 NOTE — TELEPHONE ENCOUNTER
Sharron called back because she needs the authorization for the test and not the script. Upon looking at the pt's insurance it was listed in the incorrect order as he only has MDCR part A. His [employer] is his primary insurance. Pt will need an authorization for his Stress Test and then it will need to be faxed to Stephanie marquez.   Pt's test date is 7/8/2025    Information for Authorization   [facility]  NPI [id number]  [address] [place] [address]   [place] [address] [phone]

## 2025-06-27 ENCOUNTER — HOSPITAL ENCOUNTER (OUTPATIENT)
Dept: CARDIOLOGY | Facility: CLINIC | Age: 75
Discharge: HOME | End: 2025-06-27
Attending: INTERNAL MEDICINE
Payer: COMMERCIAL

## 2025-06-27 ENCOUNTER — TELEPHONE (OUTPATIENT)
Dept: CARDIOLOGY | Facility: CLINIC | Age: 75
End: 2025-06-27
Payer: COMMERCIAL

## 2025-06-27 VITALS
WEIGHT: 251 LBS | HEIGHT: 68 IN | DIASTOLIC BLOOD PRESSURE: 59 MMHG | BODY MASS INDEX: 38.04 KG/M2 | SYSTOLIC BLOOD PRESSURE: 103 MMHG

## 2025-06-27 DIAGNOSIS — R06.02 SHORTNESS OF BREATH: ICD-10-CM

## 2025-06-27 LAB
AORTIC ROOT ANNULUS: 3.9 CM
AORTIC VALVE AT: 88 MS
AORTIC VALVE MEAN VELOCITY: 2.15 M/S
AORTIC VALVE VELOCITY TIME INTEGRAL: 53.9 CM
ASCENDING AORTA: 3.6 CM
AV MEAN GRADIENT: 20 MMHG
AV PEAK GRADIENT: 30 MMHG
AV PEAK VELOCITY-S: 2.74 M/S
AV VALVE AREA INDEX: 0.73
AV VALVE AREA: 1.25 CM2
AV VELOCITY RATIO: 0.4
AVA (VTI): 1.68 CM2
BSA FOR ECHO PROCEDURE: 2.29 M2
DOP CALC LVOT STROKE VOLUME: 90.53 CM3
E WAVE DECELERATION TIME: 348 MS
E/E' RATIO: 28.9
E/LAT E' RATIO: 22.7
EDV (BP): 86.9 CM3
EF (A4C): 62.1 %
EF A2C: 51.9 %
EJECTION FRACTION: 58.2 %
EST RIGHT VENT SYSTOLIC PRESSURE BY TRICUSPID REGURGITATION JET: 29 MMHG
ESV (BP): 36.3 CM3
FRACTIONAL SHORTENING: 40.2 %
HEART RATE MV: 76 BPM
INTERVENTRICULAR SEPTUM: 1.43 CM
LA ESV (BP): 114 CM3
LA ESV INDEX (A2C): 53.28 CM3/M2
LA ESV INDEX (BP): 49.78 CM3/M2
LA/AORTA RATIO: 1.36
LAAS-AP2: 31.9 CM2
LAAS-AP4: 30.4 CM2
LAD 2D: 5.3 CM
LAL MED-LAT (A4C): 7.36 CM
LAV-S: 122 CM3
LEFT ATRIAL LENGTH SUPERIOR-INFERIOR (APICAL 2-CHAMBER VIEW): 7.14 CM
LEFT ATRIUM VOLUME INDEX: 44.98 CM3/M2
LEFT ATRIUM VOLUME: 103 CM3
LEFT INTERNAL DIMENSION IN SYSTOLE: 2.41 CM (ref 4.01–6.08)
LEFT VENTRICLE DIASTOLIC VOLUME INDEX: 38.34 CM3/M2
LEFT VENTRICLE DIASTOLIC VOLUME: 87.8 CM3
LEFT VENTRICLE SYSTOLIC VOLUME INDEX: 14.54 CM3/M2
LEFT VENTRICLE SYSTOLIC VOLUME: 33.3 CM3
LEFT VENTRICULAR INTERNAL DIMENSION IN DIASTOLE: 4.03 CM (ref 6.91–9.61)
LEFT VENTRICULAR POSTERIOR WALL IN END DIASTOLE: 1.35 CM (ref 0.83–1.55)
LV DIASTOLIC VOLUME: 84.4 CM3
LV ESV (APICAL 2 CHAMBER): 40.6 CM3
LVAD-AP2: 28.4 CM2
LVAD-AP4: 29.7 CM2
LVAS-AP2: 17.9 CM2
LVAS-AP4: 16.2 CM2
LVEDVI(A2C): 36.86 CM3/M2
LVEDVI(BP): 37.95 CM3/M2
LVESVI(A2C): 17.73 CM3/M2
LVESVI(BP): 15.85 CM3/M2
LVLD-AP2: 8.02 CM
LVLD-AP4: 8.21 CM
LVLS-AP2: 6.95 CM
LVLS-AP4: 6.97 CM
LVOT 2D: 2.3 CM
LVOT A: 4.15 CM2
LVOT MG: 3 MMHG
LVOT MV: 0.83 M/S
LVOT PEAK VELOCITY: 1.05 M/S
LVOT PG: 4 MMHG
LVOT STROKE VOLUME INDEX: 39.53 ML/M2
LVOT VTI: 21.8 CM
MITRAL VALVE MEAN INFLOW VELOCITY: 1.96 M/S
MLH CV ECHO AVA INDEX VELOCITY RATIO: 0.5
MV E'TISSUE VEL-LAT: 0.08 M/S
MV E'TISSUE VEL-MED: 0.06 M/S
MV MEAN GRADIENT: 4 MMHG
MV PEAK E VEL: 1.79 M/S
MV PEAK GRADIENT: 11 MMHG
MV STENOSIS PRESSURE HALF TIME: 87 MS
MV VALVE AREA BY CONTINUITY EQUATION: 2.14 CM2
MV VALVE AREA P 1/2 METHOD: 2.53 CM2
MV VTI: 42.3 CM
POSTERIOR WALL: 1.35 CM
PV MEAN GRADIENT: 1 MMHG
PV MEAN VELOCITY: 0.57 M/S
PV PEAK GRADIENT: 3 MMHG
PV PV: 0.83 M/S
RAP: 8 MMHG
RVOT VMAX: 0.51 M/S
RVOT VTI: 9.14 CM
SEPTAL TISSUE DOPPLER FREE WALL LATE DIA VELOCITY (APICAL 4 CHAMBER VIEW): 0.19 M/S
TR MAX PG: 20.98 MMHG
TRICUSPID VALVE PEAK REGURGITATION VELOCITY: 2.29 M/S
Z-SCORE OF LEFT VENTRICULAR DIMENSION IN END DIASTOLE: -7.04
Z-SCORE OF LEFT VENTRICULAR DIMENSION IN END SYSTOLE: -5.67
Z-SCORE OF LEFT VENTRICULAR POSTERIOR WALL IN END DIASTOLE: 0.91

## 2025-06-27 PROCEDURE — 93306 TTE W/DOPPLER COMPLETE: CPT | Performed by: INTERNAL MEDICINE

## 2025-06-27 NOTE — TELEPHONE ENCOUNTER
On June 27, 2025 I spoke with Yared Hensley about his echo.  His gradient across the TAVR is minimally increased and his mitral stenosis is mild.  His left ventricular function is hyperdynamic.  His pharmacologic stress test is pending

## 2025-07-07 DIAGNOSIS — I25.10 CORONARY ARTERY DISEASE DUE TO CALCIFIED CORONARY LESION: Primary | ICD-10-CM

## 2025-07-07 DIAGNOSIS — I25.84 CORONARY ARTERY DISEASE DUE TO CALCIFIED CORONARY LESION: Primary | ICD-10-CM

## 2025-07-08 DIAGNOSIS — I10 ESSENTIAL HYPERTENSION: ICD-10-CM

## 2025-07-08 RX ORDER — CARVEDILOL 25 MG/1
25 TABLET ORAL
Qty: 180 TABLET | Refills: 3 | Status: SHIPPED | OUTPATIENT
Start: 2025-07-08

## 2025-07-11 DIAGNOSIS — R94.39 ABNORMAL STRESS ECG: Primary | ICD-10-CM

## 2025-07-11 DIAGNOSIS — I25.10 CORONARY ARTERY DISEASE DUE TO CALCIFIED CORONARY LESION: ICD-10-CM

## 2025-07-11 DIAGNOSIS — I25.84 CORONARY ARTERY DISEASE DUE TO CALCIFIED CORONARY LESION: ICD-10-CM

## 2025-07-13 ENCOUNTER — TELEPHONE (OUTPATIENT)
Dept: CARDIOLOGY | Facility: CLINIC | Age: 75
End: 2025-07-13
Payer: COMMERCIAL

## 2025-07-14 PROBLEM — R94.39 ABNORMAL STRESS ECG: Status: ACTIVE | Noted: 2025-07-11

## 2025-07-14 NOTE — TELEPHONE ENCOUNTER
On July 13, 2025 I spoke with Yared Hensley about his pharmacologic stress test.  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  By echo his apex moves.  Certainly this infarct could be related to a falsely positive study, however could also be related to persistent ischemia.  Given his significant shortness of breath I do think he should proceed with a right and left heart catheterization to better assess his coronary anatomy and hemodynamics.  He is agreeable to this.  This will be set up in the near future with Dr. Pedroza

## 2025-07-14 NOTE — TELEPHONE ENCOUNTER
On July 13, 2025 I spoke with Yared Hensley about his pharmacologic stress test.  There was evidence of an apical infarct.  There was no ischemia.  His echo shows normal function without any segmental wall motion abnormalities.  Because of his persistent significant shortness of breath I do  feel that progressive coronary disease should be excluded.  In addition a right heart cath should be done to assess his hemodynamics.  He is agreeable to this.  Will be set up with Dr. Pedroza in the near future

## 2025-07-15 DIAGNOSIS — E78.00 PURE HYPERCHOLESTEROLEMIA: ICD-10-CM

## 2025-07-15 RX ORDER — EVOLOCUMAB 140 MG/ML
INJECTION, SOLUTION SUBCUTANEOUS
Qty: 6 ML | Refills: 3 | Status: SHIPPED | OUTPATIENT
Start: 2025-07-15

## 2025-07-16 NOTE — PROGRESS NOTES
On July 16, 2025 I spoke with Yared Hensley.  He has lost a considerable amount of weight on Ozempic.    His blood pressures in our getting too low.  He has not had hydralazine in several days and his blood pressure still only 109 systolic.  I have cut back on his clonidine to 0.2 mg daily.  I have stopped his hydrochlorothiazide completely and decrease his amlodipine to 5 mg daily.

## 2025-07-17 LAB
BASOPHILS # BLD AUTO: 42 CELLS/UL (ref 0–200)
BASOPHILS NFR BLD AUTO: 0.7 %
BUN SERPL-MCNC: 25 MG/DL (ref 7–25)
BUN/CREAT SERPL: 17 (CALC) (ref 6–22)
CALCIUM SERPL-MCNC: 9.8 MG/DL (ref 8.6–10.3)
CHLORIDE SERPL-SCNC: 100 MMOL/L (ref 98–110)
CO2 SERPL-SCNC: 27 MMOL/L (ref 20–32)
CREAT SERPL-MCNC: 1.45 MG/DL (ref 0.7–1.28)
EGFRCR SERPLBLD CKD-EPI 2021: 51 ML/MIN/1.73M2
EOSINOPHIL # BLD AUTO: 336 CELLS/UL (ref 15–500)
EOSINOPHIL NFR BLD AUTO: 5.6 %
ERYTHROCYTE [DISTWIDTH] IN BLOOD BY AUTOMATED COUNT: 13.5 % (ref 11–15)
GLUCOSE SERPL-MCNC: 116 MG/DL (ref 65–99)
HCT VFR BLD AUTO: 43.3 % (ref 38.5–50)
HGB BLD-MCNC: 14.9 G/DL (ref 13.2–17.1)
LYMPHOCYTES # BLD AUTO: 1074 CELLS/UL (ref 850–3900)
LYMPHOCYTES NFR BLD AUTO: 17.9 %
MCH RBC QN AUTO: 33 PG (ref 27–33)
MCHC RBC AUTO-ENTMCNC: 34.4 G/DL (ref 32–36)
MCV RBC AUTO: 95.8 FL (ref 80–100)
MONOCYTES # BLD AUTO: 480 CELLS/UL (ref 200–950)
MONOCYTES NFR BLD AUTO: 8 %
NEUTROPHILS # BLD AUTO: 4068 CELLS/UL (ref 1500–7800)
NEUTROPHILS NFR BLD AUTO: 67.8 %
PLATELET # BLD AUTO: 196 THOUSAND/UL (ref 140–400)
PMV BLD REES-ECKER: 10 FL (ref 7.5–12.5)
POTASSIUM SERPL-SCNC: 4.6 MMOL/L (ref 3.5–5.3)
RBC # BLD AUTO: 4.52 MILLION/UL (ref 4.2–5.8)
SODIUM SERPL-SCNC: 138 MMOL/L (ref 135–146)
WBC # BLD AUTO: 6 THOUSAND/UL (ref 3.8–10.8)

## 2025-07-17 NOTE — H&P
Cardiology History and Physical     Admitting Diagnosis   Abnormal stress ECG [R94.39]   HPI    Yared Hensley is a 74 y.o. male with PMH of AS s/p TAVR 2022, CAD (last cath 60% mid LAD lesion with normal IFR), CVA, Atrial Fibrillation on eliquis,  HTN, HLD, Type 2 DM, BPH,  who presents to [facility] for cardiac catheterization.     Pt was seen by dr Miller on 6/6/2025 and was complaining of SOB and malaise. Pt underwent a pharmacologic stress test that showed an apical infarct. There was no ischemia. Because of persistent SOB dr Miller recommended right and left heart catheterization to assess hemodynamics and evaluate for progressive coronary disease.        Medical History   Medical History:   Past Medical History:   Diagnosis Date    Atrial fibrillation (CMS/HCC) 01/28/2022    Basal cell carcinoma     scalp    GERD (gastroesophageal reflux disease)     Hyperlipidemia     Hypertension     S/P TAVR (transcatheter aortic valve replacement) 4/4/2022    Transfemoral TAVR #29 Noel on 3-    TIA (transient ischemic attack)     Type 2 diabetes mellitus (CMS/[facility])        Surgical History:   Past Surgical History   Procedure Laterality Date    Aortic valve replacement      Colonoscopy      Coronary angiography N/A 2/25/2022    [place] [facility] [place]    Left heart cath N/A 2/25/2022    [place] [facility] [place]    Skin biopsy      excision of basal cell ca on scalp    TAVR percutaneous femoral N/A 3/30/2022    [place] [facility] [place]    TAVR TRANSFEMORAL AORTIC VALVE REPLACEMENT N/A 3/30/2022    [place]row Ir[place] [facility] [place]       Allergies: Statins-hmg-coa reductase inhibitors    No current facility-administered medications for this encounter.     Current Outpatient Medications   Medication Sig Dispense Refill    alfuzosin (UROXATRAL) 10 mg 24 hr tablet Take 10 mg by mouth daily.      amLODIPine (NORVASC) 5 mg tablet  Take 10 mg by mouth daily.      amoxicillin (AMOXIL) 500 mg capsule Please take 1 tablet (500 mg) 1 hour prior to appt and after appt. 3 capsule 0    aspirin 81 mg enteric coated tablet Take 1 tablet (81 mg total) by mouth daily. 30 tablet 3    carvediloL (COREG) 25 mg tablet TAKE 1 TABLET BY MOUTH TWICE A DAY WITH FOOD 180 tablet 3    cholecalciferol, vitamin D3, 50 mcg (2,000 unit) capsule       cloNIDine (CATAPRES-TTS-1) 0.1 mg/24 hr Place 1 patch on the skin once a week. (Patient taking differently: Place 1 patch on the skin once a week. 0.2 patch) 12 patch 3    ELIQUIS 5 mg tablet TAKE 1 TABLET BY MOUTH TWICE A  tablet 1    furosemide (LASIX) 20 mg tablet Take 20 mg by mouth as needed.      glipiZIDE (GLUCOTROL) 5 mg tablet Take 10 mg by mouth 2 (two) times a day.      JANUVIA 100 mg tablet Take 100 mg by mouth daily.      lisinopriL (PRINIVIL) 40 mg tablet Take 40 mg by mouth 2 (two) times a day.      metFORMIN (GLUCOPHAGE) 500 mg tablet 1,000 mg 2 (two) times a day with meals.        ONETOUCH ULTRA TEST strip       OZEMPIC 0.25 mg or 0.5 mg (2 mg/3 mL) subcutaneous injection Inject 0.25 mg under the skin every (seven) 7 days.      REPATHA SYRINGE 140 mg/mL syringe syringe INJECT 140 MG UNDER THE SKIN. 6 mL 3    tamsulosin (FLOMAX) 0.4 mg capsule Take 0.4 mg by mouth daily.      vit A/vit C/vit E/zinc/copper (PRESERVISION AREDS ORAL) Take by mouth 2 times daily.      vitamin E 200 unit capsule Take 200 Units by mouth daily.      ZINC ORAL Take 50 mg by mouth as needed.      zolpidem (AMBIEN) 5 mg tablet Take 5 mg by mouth daily.         Social History:   Social History     Socioeconomic History    Marital status:     Number of children: 3   Occupational History    Occupation: radiation oncologist   Tobacco Use    Smoking status: Never    Smokeless tobacco: Never   Vaping Use    Vaping status: Never Used   Substance and Sexual Activity    Alcohol use: Yes     Alcohol/week: 2.0 standard drinks of  alcohol     Types: 2 Glasses of wine per week     Comment: rarely    Drug use: Never    Sexual activity: Defer   Social History Narrative    Lives at home with wife and 3 daughters, two story home       Family History:   Family History   Problem Relation Name Age of Onset    Hypertension Biological Mother      Heart disease Biological Father      Hypertension Biological Father           Review of Systems   {Review Of Systems:82217}   Objective    Vital Signs for the last 24 hours   BP: ()/()   Arterial Line BP: ()/()        Physical Exam   {Exam; Complete Normal And System Select:04948}      Labs   Lab Results   Component Value Date    WBC 6.0 07/16/2025    HGB 14.9 07/16/2025    HCT 43.3 07/16/2025     07/16/2025    ALT 27 03/22/2022    AST 22 03/22/2022     07/16/2025    K 4.6 07/16/2025     07/16/2025    CREATININE 1.45 (H) 07/16/2025    BUN 25 07/16/2025    CO2 27 07/16/2025    INR 1.1 03/30/2022    HGBA1C 5.3 03/22/2022     Troponin I Results    No lab values to display.           Imaging   {Imaging reviewed last 24H:84110}     Cardiac Imaging    TRANSTHORACIC ECHO (TTE) COMPLETE 06/27/2025    Interpretation Summary  1.  Hyperdynamic left ventricular function with an estimated ejection fraction of 70 to 75%.  2.  Mild to moderate concentric left ventricular hypertrophy.  3.  Moderately dilated left atrium.  Mildly dilated right atrium.  4.  Mildly dilated right ventricle with normal systolic function.  5.  A transaortic valve replacement is present.  Mean gradient across the valve is 20 mmHg which is increased from 14 mmHg.  The valve area calculated is 1.68 cm².  This is consistent with mild aortic stenosis.  6.  Moderate mitral annular calcification with mild mitral stenosis.  Mean gradient is 4 mmHg.  7.  Mild tricuspid insufficiency with normal pulmonary artery pressures.  8.  The ascending aorta is ectatic at 3.9 cm.  9.  The inferior vena cava is dilated and has normal respiratory  collapse       ECG     {Findings; ecg normal:84928}   Telemetry   {Findings; telemetry normal:44078}      Assessment/Plan      CAD   -last cath mid LAD 60% stenosis    - recent stress with small area of infarct in the mid to apical inferior wall with no evidence of residual ischemia. Inferior wall perfusion defect new compared to last stress test 10/2023   - for cardiac catheterization today                   CONOR Degroot  7/17/2025  3:11 PM

## 2025-07-17 NOTE — TELEPHONE ENCOUNTER
On July 17, 2025 I spoke with Yared about his creatinine.  It is gone up to 1.45 is compared to a creatinine done several years ago in 2022 when it was 0.9.  He will hold his Aldactone as well as his lisinopril prior to his catheterization tomorrow.

## 2025-07-18 ENCOUNTER — TELEPHONE (OUTPATIENT)
Dept: CARDIOLOGY | Facility: CLINIC | Age: 75
End: 2025-07-18
Payer: COMMERCIAL

## 2025-07-18 ENCOUNTER — HOSPITAL ENCOUNTER (OUTPATIENT)
Facility: HOSPITAL | Age: 75
Setting detail: HOSPITAL OUTPATIENT SURGERY
Discharge: HOME | End: 2025-07-18
Attending: INTERNAL MEDICINE | Admitting: INTERNAL MEDICINE
Payer: COMMERCIAL

## 2025-07-18 VITALS
HEART RATE: 62 BPM | TEMPERATURE: 97.3 F | HEIGHT: 68 IN | WEIGHT: 243 LBS | SYSTOLIC BLOOD PRESSURE: 133 MMHG | DIASTOLIC BLOOD PRESSURE: 72 MMHG | RESPIRATION RATE: 21 BRPM | OXYGEN SATURATION: 94 % | BODY MASS INDEX: 36.83 KG/M2

## 2025-07-18 DIAGNOSIS — R94.39 ABNORMAL STRESS ECG: ICD-10-CM

## 2025-07-18 LAB
GLUCOSE BLD-MCNC: 104 MG/DL (ref 70–99)
GLUCOSE BLD-MCNC: 116 MG/DL (ref 70–99)
POCT ACT-LR: 242 SEC (ref 116–155)
POCT OXYHGB: 70.6 % (ref 93–98)
POCT TEST: ABNORMAL

## 2025-07-18 PROCEDURE — 71000011 HC PACU PHASE 1 EA ADDL MIN: Performed by: INTERNAL MEDICINE

## 2025-07-18 PROCEDURE — 99153 MOD SED SAME PHYS/QHP EA: CPT | Performed by: INTERNAL MEDICINE

## 2025-07-18 PROCEDURE — 63600105 HC IODINE BASED CONTRAST: Performed by: INTERNAL MEDICINE

## 2025-07-18 PROCEDURE — 63600000 HC DRUGS/DETAIL CODE: Performed by: INTERNAL MEDICINE

## 2025-07-18 PROCEDURE — 93571 IV DOP VEL&/PRESS C FLO 1ST: CPT | Mod: 52,LD | Performed by: INTERNAL MEDICINE

## 2025-07-18 PROCEDURE — C1769 GUIDE WIRE: HCPCS | Performed by: INTERNAL MEDICINE

## 2025-07-18 PROCEDURE — 200200 PR NO CHARGE: Performed by: INTERNAL MEDICINE

## 2025-07-18 PROCEDURE — C1751 CATH, INF, PER/CENT/MIDLINE: HCPCS | Performed by: INTERNAL MEDICINE

## 2025-07-18 PROCEDURE — 93460 R&L HRT ART/VENTRICLE ANGIO: CPT | Performed by: INTERNAL MEDICINE

## 2025-07-18 PROCEDURE — 93460 R&L HRT ART/VENTRICLE ANGIO: CPT | Mod: 26 | Performed by: INTERNAL MEDICINE

## 2025-07-18 PROCEDURE — 27200000 HC STERILE SUPPLY: Performed by: INTERNAL MEDICINE

## 2025-07-18 PROCEDURE — 63700000 HC SELF-ADMINISTRABLE DRUG: Performed by: NURSE PRACTITIONER

## 2025-07-18 PROCEDURE — 85347 COAGULATION TIME ACTIVATED: CPT | Performed by: INTERNAL MEDICINE

## 2025-07-18 PROCEDURE — 4A033BC MEASUREMENT OF ARTERIAL PRESSURE, CORONARY, PERCUTANEOUS APPROACH: ICD-10-PCS | Performed by: INTERNAL MEDICINE

## 2025-07-18 PROCEDURE — 99152 MOD SED SAME PHYS/QHP 5/>YRS: CPT | Performed by: INTERNAL MEDICINE

## 2025-07-18 PROCEDURE — 93572 IV DOP VEL&/PRESS C FLO EA: CPT | Mod: 26,52,LC | Performed by: INTERNAL MEDICINE

## 2025-07-18 PROCEDURE — 71000001 HC PACU PHASE 1 INITIAL 30MIN: Performed by: INTERNAL MEDICINE

## 2025-07-18 PROCEDURE — B2111ZZ FLUOROSCOPY OF MULTIPLE CORONARY ARTERIES USING LOW OSMOLAR CONTRAST: ICD-10-PCS | Performed by: INTERNAL MEDICINE

## 2025-07-18 PROCEDURE — C1887 CATHETER, GUIDING: HCPCS | Performed by: INTERNAL MEDICINE

## 2025-07-18 PROCEDURE — 4A023N8 MEASUREMENT OF CARDIAC SAMPLING AND PRESSURE, BILATERAL, PERCUTANEOUS APPROACH: ICD-10-PCS | Performed by: INTERNAL MEDICINE

## 2025-07-18 PROCEDURE — 93571 IV DOP VEL&/PRESS C FLO 1ST: CPT | Mod: 26,52,LD | Performed by: INTERNAL MEDICINE

## 2025-07-18 PROCEDURE — C1894 INTRO/SHEATH, NON-LASER: HCPCS | Performed by: INTERNAL MEDICINE

## 2025-07-18 PROCEDURE — 25000000 HC PHARMACY GENERAL: Performed by: INTERNAL MEDICINE

## 2025-07-18 RX ORDER — IOPAMIDOL 612 MG/ML
INJECTION, SOLUTION INTRAVASCULAR
Status: DISCONTINUED | OUTPATIENT
Start: 2025-07-18 | End: 2025-07-18 | Stop reason: HOSPADM

## 2025-07-18 RX ORDER — SPIRONOLACTONE 25 MG/1
25 TABLET ORAL DAILY
COMMUNITY

## 2025-07-18 RX ORDER — DEXTROSE 50 % IN WATER (D50W) INTRAVENOUS SYRINGE
25 AS NEEDED
Status: DISCONTINUED | OUTPATIENT
Start: 2025-07-18 | End: 2025-07-18 | Stop reason: HOSPADM

## 2025-07-18 RX ORDER — LIDOCAINE HYDROCHLORIDE 10 MG/ML
INJECTION, SOLUTION INFILTRATION; PERINEURAL
Status: DISCONTINUED | OUTPATIENT
Start: 2025-07-18 | End: 2025-07-18 | Stop reason: HOSPADM

## 2025-07-18 RX ORDER — ADHESIVE BANDAGE 7/8"
15-30 BANDAGE TOPICAL AS NEEDED
Status: DISCONTINUED | OUTPATIENT
Start: 2025-07-18 | End: 2025-07-18 | Stop reason: HOSPADM

## 2025-07-18 RX ORDER — CLONIDINE 0.2 MG/24H
1 PATCH, EXTENDED RELEASE TRANSDERMAL WEEKLY
COMMUNITY

## 2025-07-18 RX ORDER — ASPIRIN 325 MG
325 TABLET ORAL ONCE
Status: COMPLETED | OUTPATIENT
Start: 2025-07-18 | End: 2025-07-18

## 2025-07-18 RX ORDER — HEPARIN SODIUM 1000 [USP'U]/ML
INJECTION, SOLUTION INTRAVENOUS; SUBCUTANEOUS
Status: DISCONTINUED | OUTPATIENT
Start: 2025-07-18 | End: 2025-07-18 | Stop reason: HOSPADM

## 2025-07-18 RX ORDER — HYDROCHLOROTHIAZIDE 12.5 MG/1
12.5 CAPSULE ORAL DAILY
COMMUNITY

## 2025-07-18 RX ORDER — MIDAZOLAM HYDROCHLORIDE 2 MG/2ML
INJECTION, SOLUTION INTRAMUSCULAR; INTRAVENOUS
Status: DISCONTINUED | OUTPATIENT
Start: 2025-07-18 | End: 2025-07-18 | Stop reason: HOSPADM

## 2025-07-18 RX ORDER — SODIUM CHLORIDE 9 MG/ML
INJECTION, SOLUTION INTRAVENOUS CONTINUOUS
Status: DISCONTINUED | OUTPATIENT
Start: 2025-07-18 | End: 2025-07-18 | Stop reason: HOSPADM

## 2025-07-18 RX ORDER — DEXTROSE 40 %
15-30 GEL (GRAM) ORAL AS NEEDED
Status: DISCONTINUED | OUTPATIENT
Start: 2025-07-18 | End: 2025-07-18 | Stop reason: HOSPADM

## 2025-07-18 RX ORDER — FENTANYL CITRATE 50 UG/ML
INJECTION, SOLUTION INTRAMUSCULAR; INTRAVENOUS
Status: DISCONTINUED | OUTPATIENT
Start: 2025-07-18 | End: 2025-07-18 | Stop reason: HOSPADM

## 2025-07-18 RX ORDER — SODIUM CHLORIDE 9 MG/ML
40 INJECTION, SOLUTION INTRAVENOUS CONTINUOUS
Status: DISCONTINUED | OUTPATIENT
Start: 2025-07-18 | End: 2025-07-18

## 2025-07-18 RX ORDER — NICARDIPINE HCL-0.9% SOD CHLOR 1 MG/10 ML
SYRINGE (ML) INTRAVENOUS
Status: DISCONTINUED | OUTPATIENT
Start: 2025-07-18 | End: 2025-07-18 | Stop reason: HOSPADM

## 2025-07-18 RX ADMIN — ASPIRIN 325 MG: 325 TABLET ORAL at 07:24

## 2025-07-18 NOTE — TELEPHONE ENCOUNTER
On July 18, 2025 I spoke with him about his cardiac catheterization.  He continues to have an LAD lesion that has a negative FFR.  His pulmonary pressures are elevated as is his wedge.  He understands that he has an element of pulmonary hypertension and diastolic dysfunction.  Most of this is most likely related to untreated sleep apnea as well as his obesity.  He tells me he will get a sleep study.    I will see him again in 6 months and do a CTA of his chest to check his aortic size at that point in time.  At the time of his TAVR his aorta measured 3.9 cm.

## 2025-07-18 NOTE — POST-PROCEDURE NOTE
Cardiovascular Post Procedure Note:    Yared Hensley  7/18/2025    Pre-op Diagnosis      * Abnormal stress ECG [R94.39]   Post-op Diagnosis     * Abnormal stress ECG [R94.39]     Procedure(s):  Right & left heart cath with coronary angiography  iFR - initial vessel   Surgeon(s):  Roge Pedroza DO Mirzozoda, Khulkar, MD    Findings:  No significant changes since the prior coronary angiography. Mid LAD 60% stenosis with iFR of 0.90 consistent with hemodynamically non-significant stenosis.      Anesthesia:  Moderate Sedation    Staff:   Circulator: Natalee Painter RN  Documenter: Zander Yuen     Estimated Blood Loss:   10 mL     Specimens:   No specimens collected during this procedure.     Implants:   Nothing was implanted during the procedure     Complications:  None    Date: 7/18/2025 Time: 9:34 AM

## 2025-07-18 NOTE — H&P
Cardiology History and Physical      Patient is a 74 y.o. male who presented for elective cardiac catheterization following a recent evaluation in the outpatient cardiology office.    Medical History:   Past Medical History:   Diagnosis Date    Atrial fibrillation (CMS/HCC) 01/28/2022    Basal cell carcinoma     scalp    GERD (gastroesophageal reflux disease)     Hyperlipidemia     Hypertension     S/P TAVR (transcatheter aortic valve replacement) 4/4/2022    Transfemoral TAVR #29 Noel on 3-    TIA (transient ischemic attack)     Type 2 diabetes mellitus (CMS/HCC)        Surgical History:   Past Surgical History   Procedure Laterality Date    Aortic valve replacement      Colonoscopy      Coronary angiography N/A 2/25/2022    Performed by Roge Pedroza DO at [facility] [place]    Left heart cath N/A 2/25/2022    Performed by Roge Pedroza DO at [facility] [place]    Skin biopsy      excision of basal cell ca on scalp    TAVR percutaneous femoral N/A 3/30/2022    Performed by Palmer Rosenthal MD at [facility] [place]    TAVR TRANSFEMORAL AORTIC VALVE REPLACEMENT N/A 3/30/2022    Performed by Woodrow Irwin MD at [facility] [place]       Allergies: Statins-hmg-coa reductase inhibitors    No current facility-administered medications for this encounter.       Social History:   Social History     Socioeconomic History    Marital status:     Number of children: 3   Occupational History    Occupation: radiation oncologist   Tobacco Use    Smoking status: Never    Smokeless tobacco: Never   Vaping Use    Vaping status: Never Used   Substance and Sexual Activity    Alcohol use: Yes     Alcohol/week: 2.0 standard drinks of alcohol     Types: 2 Glasses of wine per week     Comment: rarely    Drug use: Never    Sexual activity: Defer   Social History Narrative    Lives at home with wife and 3 daughters, two story home       Family History:   Family History   Problem Relation Name Age of Onset    Hypertension Biological Mother       Heart disease Biological Father      Hypertension Biological Father           Objective     Vital Signs for the last 24 hours:  BP: ()/()   Arterial Line BP: ()/()     There were no vitals taken for this visit.    General Appearance:  Alert, no distress   Head:  Normocephalic, without obvious abnormality, atraumatic   Eyes:  Conjunctiva/corneas clear, EOM's intact   Endocrine: No thyroid enlargement    Lungs:   Clear to auscultation bilaterally, respirations unlabored, no rales, no wheezing   Heart:  Regular rhythm, S1 and S2 normal   Abdomen:   Soft, non-tender, no masses, no organomegaly   Vascular: Pulses 2+ and symmetric all extremities, no carotid bruit or jugular vein distention   Musculoskeletal:  Skin: No injury or deformity  Skin color, texture, turgor normal, no rashes or lesions, no cyanosis or edema   Extremities: Extremities normal, atraumatic   Behavior/Emotional: Appropriate, cooperative          Labs   No new labs.    ASSESSMENT AND PLAN  74 y.o. male presented for elective cardiac catheterization    Risks and benefits of the procedure fully discussed with the patient who is agreeable to proceed.  Further management will be decided based on the results of catheterization  Roge Pedroza DO  7/18/2025  6:33 AM

## 2025-07-18 NOTE — Clinical Note
The bilateral groins, left radial and left brachial was clipped, marked and prepped with ChloraPrep. The patient was draped in a sterile fashion after allowing for the recommended dry time.

## 2025-07-18 NOTE — PRE-PROCEDURE NOTE
Cardiac Cath Lab Pre-procedure Note    - Patient was seen and examined at bedside.  - The patient's chart and all data was reviewed.  - The procedure, treatment alternatives, risks and benefits were explained with specific risks discussed.  - Patient was consented for cardiac cath procedure and possible PCI.  - Patient's case was found appropriate for dual antiplatelet therapy.    Indication for procedure: Pre-Op Diagnosis Codes:      * Abnormal stress ECG [R94.39]    Patient's clinical presentation to the cardiac cath lab: CHEUNG is anginal equivalent.    Patient is at risk for renal failure potentially requiring dialysis due to the presence of pre-procedural abnormal renal function.   Patient appears to be severly frail.     Notable Non-Invasive Cardiac Testing    nuclear stress test,                   Patient is presenting today with no CHF.    Pre-sedation assessment  ASA 3  Mallampati class: II - soft palate, uvula, fauces visible.

## 2025-07-18 NOTE — POST-PROCEDURE NOTE
Pt discharged home with wife. Pt remains in atrial fibrillation HR 60-70's and vital signs stable. Left radial cath site with dressing c/d/I. No s/s of bleeding or hematoma. Radial pulse palpable +2. Discharge instructions given and patient verbalizes understanding.

## 2025-07-18 NOTE — DISCHARGE INSTRUCTIONS
Post procedure instructions:  No driving, operating heavy machinery, working, making critical decisions or activities that require balance for 24 hours.  This is because of sedation you received for your procedure.  On day of discharge, limit activities.  No vigorous activity or heavy lifting greater than 10 lbs for the next 2 days after procedure.    Remove dressing next day. Keep site clean and dry.  May shower next day of procedure. Do not submerge catheterization site in pool or tub bath for 7 days  Call if swelling, bleeding, drainage, increased pain from catheterization site.  Call if chest pain, shortness of breath, lightheadedness, near fainting or fainting. If mild symptoms call office; if severe call 911.  Call if fever or any concerns.   If you have bleeding where your catheter was:  If severe call 911, lay down and apply pressure while you wait for help to arrive   If mild and you feel at your baseline, hold direct pressure for 10 minutes. If bleeding does not stop in 10 minutes, or if there is a large amount of  bleeding, call 911. If bleeding does stop, rest for at least 4 hours. Call to notify your doctor.    Medications:   -Do not take the Eliquis today. Ok to restart the Eliquis tomorrow morning, 7/19/25.   -Hold the metformin for 48 hours after the procedure. Ok to restart the metformin on Sunday evening 7/20/25.  -Continue other medication as before.    -Call your physician's office with any questions or concerns.    Follow up with  Dr. Miller, [phone]. If a follow up cardiology appointment is not scheduled, please call to arrange one to be seen within the next few weeks.

## 2025-07-18 NOTE — Clinical Note
Patient placed on procedure table in supine position with left arm extended. Positioning devices: all pressure points padded, arm board under arms and wedge behind back.

## 2025-07-19 DIAGNOSIS — I35.0 NONRHEUMATIC AORTIC VALVE STENOSIS: Primary | ICD-10-CM

## 2025-07-19 DIAGNOSIS — I35.0 SEVERE AORTIC STENOSIS: ICD-10-CM

## 2025-07-19 DIAGNOSIS — I50.33 CHF (CONGESTIVE HEART FAILURE), NYHA CLASS III, ACUTE ON CHRONIC, DIASTOLIC (CMS/HCC): ICD-10-CM

## 2025-07-21 ENCOUNTER — TELEPHONE (OUTPATIENT)
Dept: CARDIOLOGY | Facility: CLINIC | Age: 75
End: 2025-07-21
Payer: COMMERCIAL

## 2025-08-26 ENCOUNTER — TELEPHONE (OUTPATIENT)
Dept: SCHEDULING | Facility: CLINIC | Age: 75
End: 2025-08-26
Payer: COMMERCIAL

## (undated) DEVICE — KIT LEFT HEART TLH

## (undated) DEVICE — SHEATH INTRODUCER PRELUDE IDEAL HYDROPHILIC SF 6F .021MM

## (undated) DEVICE — PAD DEFIB BIPHASIC HANDS FREE

## (undated) DEVICE — TUBING SMOKE EVAC PENCIL COATED

## (undated) DEVICE — Device

## (undated) DEVICE — GUIDEWIRE ASAHI GRAND SLAM 300CM

## (undated) DEVICE — DRAPE HALF STERILE

## (undated) DEVICE — CATH 5FR RIM

## (undated) DEVICE — SHEATH INTRODUCER PRELUDE IDEAL HYDROPHILIC SF 7F .021MM

## (undated) DEVICE — WIRE AMPLATZ THSCF-35-145-3AES

## (undated) DEVICE — GUIDEWIRE DIAGNOSTIC 035-260 EXCHANGE

## (undated) DEVICE — SUTURE BOOTS YELLOW STANDARD

## (undated) DEVICE — GUIDEWIRE VERSACORE FLOPPY 260CM

## (undated) DEVICE — SWANGANZ PACE 5FR CATH

## (undated) DEVICE — TR BAND REGULAR

## (undated) DEVICE — COVER MAYO STAND

## (undated) DEVICE — ***USE 121412***PACK DEVICE IMPLANT TLH

## (undated) DEVICE — DRAPE 3/4 REINFORCED

## (undated) DEVICE — GUIDEWIRE PRESSURE OMNI WIRE JTIP 185CM

## (undated) DEVICE — GUIDEWIRE VERSACORE MOD J 145CM

## (undated) DEVICE — DRESSING TEGADERM 4X4 3/4

## (undated) DEVICE — GUIDEWIRE AMPLATZ EXTRA STIFF .035 X 260CM

## (undated) DEVICE — GLIDESHEATH SLENDER SS (.021) 5FR 10CM

## (undated) DEVICE — CATH F5 INF 3DRC 100CM

## (undated) DEVICE — SET ENDOVASCULAR DILATOR 20/22FR

## (undated) DEVICE — SUTURE SILK 0 K834H

## (undated) DEVICE — CATH 6FR PIG 145 ANGLE

## (undated) DEVICE — PACK RFID MAJOR PROCEDURE PACK

## (undated) DEVICE — SET MICROPUNCTURE INTRO

## (undated) DEVICE — CATH SWANGANZ 6FR 2LUMEN

## (undated) DEVICE — BLADE SCALPEL #11

## (undated) DEVICE — ADHESIVE SKIN DERMABOND ADVANCED 0.7ML

## (undated) DEVICE — CATHETER DIAG FL 3.5  5FR

## (undated) DEVICE — GLIDEWIRE ANGLED .25IN X 150 J WIRE

## (undated) DEVICE — *T* SHEATH INTRODUCER PRELUDE IDEAL HYDROPHILIC SF 5F .021MM

## (undated) DEVICE — TRANSDUCER DISP 12IN CABLE

## (undated) DEVICE — APPLICATOR CHLORAPREP 10.5ML ORANGE TINT

## (undated) DEVICE — BLADE SCALPEL #15

## (undated) DEVICE — KIT CATH LAB ANGIO

## (undated) DEVICE — PAD GROUND ELECTROSURGICAL W/CORD

## (undated) DEVICE — TR BAND LONG

## (undated) DEVICE — COVER PROBE WITH-GEL TELESCOPIC-FOLDED 6X48IN

## (undated) DEVICE — STOPCOCKS 3-WAY HIGH PRESSURE

## (undated) DEVICE — GUIDEWIRE DIAGNOSTIC J .035. 150 (ORDER IN 5'S)

## (undated) DEVICE — CATH INTROCAN SAFETY FEP 20G X 1IN

## (undated) DEVICE — DRAPE IOBAN EXTRA LARGE

## (undated) DEVICE — GLIDESHEATH SLENDER SS (.021) 6FR 10CM

## (undated) DEVICE — SHEATH INTRODUCER PRELUDE IDEAL HYDROPHILIC SF 5F .021MM

## (undated) DEVICE — CATH 5FR AR2

## (undated) DEVICE — CATH D 6F FR4 100CM

## (undated) DEVICE — SHEATH 8 FR  WITH GUIDEWIRE

## (undated) DEVICE — DRAPE C-ARM X-RAY EQUIPMENT IMAGE